# Patient Record
Sex: FEMALE | Race: WHITE | Employment: FULL TIME | URBAN - METROPOLITAN AREA
[De-identification: names, ages, dates, MRNs, and addresses within clinical notes are randomized per-mention and may not be internally consistent; named-entity substitution may affect disease eponyms.]

---

## 2017-01-27 DIAGNOSIS — I10 HYPERTENSION, GOAL BELOW 140/90: Primary | ICD-10-CM

## 2017-01-31 NOTE — TELEPHONE ENCOUNTER
metoprolol (TOPROL-XL) 50 MG 24 hr tablet      Last Written Prescription Date: 10/20/16  Last Fill Quantity: 90, # refills: 0    Last Office Visit with [facility], [facility] or [facility] prescribing provider:  12/09/16.   Future Office Visit:        BP Readings from Last 3 Encounters:   12/09/16 118/80   11/10/16 130/82   10/18/16 136/85

## 2017-02-03 NOTE — TELEPHONE ENCOUNTER
[facility] Call Center    Phone Message    Name of Caller: Sindhu    Phone Number: Cell number on file:    Telephone Information:   Mobile [phone]       Best time to return call: any    May a detailed message be left on voicemail: yes    Relation to patient: Self    Reason for Call: Other: patient is calling following up on refill request. Please advise.     Action Taken: Message routed to:  Primary Care p [phone]

## 2017-02-06 RX ORDER — METOPROLOL SUCCINATE 50 MG/1
TABLET, EXTENDED RELEASE ORAL
Qty: 90 TABLET | Refills: 2 | Status: SHIPPED | OUTPATIENT
Start: 2017-02-06 | End: 2017-10-31

## 2017-02-06 NOTE — TELEPHONE ENCOUNTER
Refilled per [facility] Refill Protocol. Informed patient refill has been completed.    Chinyere Godfrey RN, AE-C

## 2017-02-15 ENCOUNTER — TELEPHONE (OUTPATIENT)
Dept: PEDIATRICS | Facility: CLINIC | Age: 42
End: 2017-02-15

## 2017-02-15 ENCOUNTER — TELEPHONE (OUTPATIENT)
Dept: NURSING | Facility: CLINIC | Age: 42
End: 2017-02-15

## 2017-02-15 DIAGNOSIS — I10 HYPERTENSION, GOAL BELOW 140/90: Primary | ICD-10-CM

## 2017-02-15 NOTE — TELEPHONE ENCOUNTER
This could be from pulled muscle. Statin side effects tend to be affecting both leg but not just one leg. If not better by next  Week, please follow up in clinic. Do not need to stop statin.

## 2017-02-15 NOTE — TELEPHONE ENCOUNTER
[facility] Call Center    Phone Message    Name of Caller: Felice    Phone Number: Other phone number:  [phone]    Best time to return call: any    May a detailed message be left on voicemail: yes    Relation to patient: Other Name: felice  Relationship: spouse  Is there legal documentation in chart to discuss information with this person: No:  Gather information or concern from the caller.  Document in the note but do NOT release any information to the person(s).  Then send message to appropriate person, as requested by the caller.      Reason for Call: Other: Patient is having leg pain and wondering if ist is due to newly prescribed statin medication.  Please call back to advise if patient should stop taking medication.      Action Taken: Message routed to:  Primary Care p [phone]

## 2017-02-15 NOTE — TELEPHONE ENCOUNTER
"Routed FNA encounter to Dr. Rodriguez to advise.    Reason for Call:Spouse calling reporting patient had new onset of \"muscle pain in right leg.\" Symptoms starting last night. Patient is not currently available for triage. Caller stating spouse requested he call due to concerns with new statin medication started on 11/17. Reporting pain started last evening and was still present this morning when going to work, pain localized to thigh area on right leg. Spouse denies any visible change to leg. Patient contact phone .   Contacted patient directly who states pain near \"main artery\" right upper leg. \"I feel like it is more of a muscle pull. \" Rating pain a \"3\". Reporting pain is improving since yesterday. Pain increases when pulling foot back towards her.   Denies visible swelling or redness.   "

## 2017-02-15 NOTE — TELEPHONE ENCOUNTER
Gave patient this information.  She states its much better now and thinks that yes it was a pulled muscle.  She will continue with the statin.    She is concerned the statin's side effects of renal impairment may be affecting her kidneys.  She states that she has had 2 kidney infections since December and since starting the statin.  Informed apteint her last creatinine was WNL in Oct 2016. Patient has fasting lab appt tomorrow morning.     Patient wondering if Dr. Rodriguez can add a kidney lab.    Nancy Douglas RN,   [facility], [facility]

## 2017-02-15 NOTE — TELEPHONE ENCOUNTER
Statin does not cause kidney impairment or increase infection risk that I'm aware of. I added kidney function test for lab tomorrow.

## 2017-02-15 NOTE — TELEPHONE ENCOUNTER
"Clinic Action Needed:Yes, Please call patient at   Reason for Call:Spouse calling reporting patient had new onset of \"muscle pain in right leg.\" Symptoms starting last night. Patient is not currently available for triage. Caller stating spouse requested he call due to concerns with new statin medication started on 11/17. Reporting pain started last evening and was still present this morning when going to work, pain localized to thigh area on right leg. Spouse denies any visible change to leg.  Patient contact phone .   Contacted patient directly who states pain near \"main artery\" right upper leg. \"I feel like it is more of a muscle pull. \" Rating pain a \"3\". Reporting pain is improving since yesterday. Pain increases when pulling foot back towards her.   Denies visible swelling or redness.   Routed to:Dorothea Bravo RN  [facility]            "

## 2017-02-15 NOTE — TELEPHONE ENCOUNTER
"Call Type: Triage Call    Presenting Problem: Spouse calling reporting patient had new onset of  \"muscle pain in right leg.\" Symptoms starting last night. Patient is  not currently available for triage. Caller stating spouse requested  he call due to concerns with new statin medication started on 11/17.  Reporting pain started last evening and was still present this  morning when going to work, thigh area on right leg. Spouse denies  any visible change to leg.  Patient contact phone .  Contacted patient directly who states pain near \"main artery\" right  upper leg. \"I feel like it is more of a muscle pull. \" Rating pain a  \"3\". Reporting pain is improving since yesterday. Pain increases  when pulling foot back towards her.  Triage Note:  Guideline Title: Leg Non-Injury  Recommended Disposition: Provide Home/Self Care  Original Inclination: Did not know what to do  Override Disposition:  Intended Action: Follow advice given  Physician Contacted: No  All other situations ?  YES  Abnormal movements in legs at night that interrupt sleep ? NO  Swelling develops during day and resolves at night ? NO  Orthopedic hardware (metal plate, oni or screw) newly bulging under or through  skin ? NO  Mobility decreasing from known or unknown cause ? NO  Gradual onset (weeks to months) of episodes of pain shooting down any extremity ?  NO  Gradual onset or worsening weakness/paralysis OR inability to purposely move ? NO  Gradual onset or worsening numbness/tingling ? NO  New pain, aching, cramping or stiffness following exercise/activity that was not  part of regular routine or was excessive for individual ? NO  Generalized muscle pain or aching ? NO  New marked swelling (twice the normal size as compared to usual appearance) ? NO  New swelling of legs that does NOT resolve with rest and elevation of legs ? NO  Aching or swelling after prolonged sitting or standing that improves with  elevation AND not previously evaluated " ? NO  Pain along shin bone after exercise persisting for 3 days or more AND not improved  with home care ? NO  Transient stiffness, tingling, decreased sensation or swelling of lower  extremities following standing or awkward positioning for a prolonged period;  symptoms improve with change of position ? NO  Painful spasms or cramping of large muscle groups (back, legs or abdomen) AND  recent heat exposure ? NO  New onset of severe pain AND change in sensation (numb, tingling, or no feeling),  change in color (pale or blue), feels cool to touch compared to other extremity.  ? NO  Sensations of numbness, tingling, extreme sensitivity, shooting or burning  discomfort that occurs with movement and stops with rest AND not previously  evaluated ? NO  Soft, balloon-like swelling behind knee that may or may not be painful AND has not  been previously evaluated ? NO  Open area on soft tissue or over any pressure point that has not improved with 2  weeks of treatment OR is getting larger, has changes in surrounding skin color,  or becoming painful. ? NO  Open area on soft tissue or over any pressure point not previously evaluated  WITHOUT signs of infection. ? NO  New onset of inability to take unassisted weight-bearing steps ? NO  Evaluated by provider and has question/concern about their condition, treatment  plan, treatment options, follow-up appointments, or other follow-up care. ? NO  Sudden onset of shortness of breath, chest pain and cough with blood tinged sputum  ? NO  New, painful cord-like swelling in calf or thigh of the leg; cord-like swelling  may feel warm or look red or discolored. ? NO  New or worsening one-sided leg swelling with pain that may be described as achy;  pain may worsen with standing or walking. ? NO  Visible, swollen veins associated with feelings of heaviness, tiredness, burning  or aching in the legs and possible mild swelling of the ankles ? NO  New onset of unbearable pain within last 24  hours ? NO  New onset mild to moderate pain that has not improved with 48 hours of medical  treatment or home care ? NO  Known or suspected exposure to MRSA and has no signs or symptoms of localized skin  infection ? NO  Extremity swelling or limitation of range of motion AND known bleeding disorder OR  taking blood thinner, chemotherapy or transplant medications ? NO  Any new OR worsening signs and symptoms of soft tissue infection ? NO  Any signs and symptoms of soft tissue infection that have not improved with 48  hours of medical care ? NO  New unexplained weakness/paralysis, change in sensation (numbness or tingling) or  inability to purposely move, especially when one side of body is involved  occurring now or within last 4 hours ? NO  Physician Instructions:  Care Advice:

## 2017-02-16 DIAGNOSIS — Z87.74 S/P REPAIR OF COARCTATION OF AORTA: ICD-10-CM

## 2017-02-16 DIAGNOSIS — I10 HYPERTENSION, GOAL BELOW 140/90: ICD-10-CM

## 2017-02-16 DIAGNOSIS — Q25.1 COARCTATION OF AORTA: ICD-10-CM

## 2017-02-16 LAB
ANION GAP SERPL CALCULATED.3IONS-SCNC: 6 MMOL/L (ref 3–14)
BUN SERPL-MCNC: 12 MG/DL (ref 7–30)
CALCIUM SERPL-MCNC: 8.7 MG/DL (ref 8.5–10.1)
CHLORIDE SERPL-SCNC: 106 MMOL/L (ref 94–109)
CHOLEST SERPL-MCNC: 135 MG/DL
CO2 SERPL-SCNC: 29 MMOL/L (ref 20–32)
CREAT SERPL-MCNC: 0.8 MG/DL (ref 0.52–1.04)
GFR SERPL CREATININE-BSD FRML MDRD: 80 ML/MIN/1.7M2
GLUCOSE SERPL-MCNC: 96 MG/DL (ref 70–99)
HDLC SERPL-MCNC: 52 MG/DL
LDLC SERPL CALC-MCNC: 66 MG/DL
NONHDLC SERPL-MCNC: 83 MG/DL
POTASSIUM SERPL-SCNC: 3.9 MMOL/L (ref 3.4–5.3)
SODIUM SERPL-SCNC: 141 MMOL/L (ref 133–144)
TRIGL SERPL-MCNC: 87 MG/DL

## 2017-02-16 PROCEDURE — 80048 BASIC METABOLIC PNL TOTAL CA: CPT | Performed by: FAMILY MEDICINE

## 2017-02-16 PROCEDURE — 80061 LIPID PANEL: CPT | Performed by: FAMILY MEDICINE

## 2017-02-16 PROCEDURE — 36415 COLL VENOUS BLD VENIPUNCTURE: CPT | Performed by: FAMILY MEDICINE

## 2017-02-16 NOTE — PROGRESS NOTES
Dear Sindhu,   Here are your recent results which are within the expected range. Please continue with your current plan of care.     Please call or Mychart to our office if you have further questions.     Sonia Rodriguez MD

## 2017-03-15 ENCOUNTER — TRANSFERRED RECORDS (OUTPATIENT)
Dept: HEALTH INFORMATION MANAGEMENT | Facility: CLINIC | Age: 42
End: 2017-03-15

## 2017-03-15 ENCOUNTER — TELEPHONE (OUTPATIENT)
Dept: NURSING | Facility: CLINIC | Age: 42
End: 2017-03-15

## 2017-03-15 NOTE — TELEPHONE ENCOUNTER
"Call Type: Triage Call    Presenting Problem: Onset 0700 hrs this morning of \"Diarrhea\" and  vomiting, chills. Patient verbalized she is also experiencing  dizziness and double vision when sitting up, lasting seconds.  Triage Note:  Guideline Title: Nausea or Vomiting  Recommended Disposition: Activate   Original Inclination:  Override Disposition:  Intended Action:  Physician Contacted: No  Vomiting associated with sudden onset of focal neurological changes (difficulty  speaking, numbness, weakness, paralysis, loss of coordination, change in vision  such as double vision or loss of visual field) ?  YES  New or worsening signs and symptoms that may indicate shock ? NO  Following ingestion of toxic or caustic substance ? NO  Vomiting red, bloody or coffee-ground material, more than streaks of blood or  scant amount (not following nosebleed within past day) ? NO  Sudden onset vomiting following ingestion or inhalation overdose (accidental or  intentional) of illegal, prescription, nonprescription or alternative drugs ? NO  Any other cardiac signs/symptoms for more than 5 minutes, now or within last hour.  Pain is NOT associated with taking a deep breath or a productive cough, movement,  or touch to a localized area on the chest or upper body. ? NO  Physician Instructions:  Care Advice: Do not give the patient anything to eat or drink.  Call  if new or worsening drowsiness/difficulty awakening, confusion  or seizure.  IMMEDIATE ACTION  Write down provider's name. List or place the following in a bag for  transport with the patient: current prescription and/or nonprescription  medications  alternative treatments, therapies and medications  and street drugs.  "

## 2017-03-17 ENCOUNTER — OFFICE VISIT (OUTPATIENT)
Dept: FAMILY MEDICINE | Facility: CLINIC | Age: 42
End: 2017-03-17
Payer: COMMERCIAL

## 2017-03-17 VITALS
WEIGHT: 148.9 LBS | BODY MASS INDEX: 25.42 KG/M2 | RESPIRATION RATE: 12 BRPM | SYSTOLIC BLOOD PRESSURE: 124 MMHG | DIASTOLIC BLOOD PRESSURE: 80 MMHG | OXYGEN SATURATION: 100 % | HEART RATE: 70 BPM | TEMPERATURE: 97.9 F | HEIGHT: 64 IN

## 2017-03-17 DIAGNOSIS — H66.92 LEFT OTITIS MEDIA, UNSPECIFIED CHRONICITY, UNSPECIFIED OTITIS MEDIA TYPE: Primary | ICD-10-CM

## 2017-03-17 PROCEDURE — 99213 OFFICE O/P EST LOW 20 MIN: CPT | Performed by: PHYSICIAN ASSISTANT

## 2017-03-17 RX ORDER — AZITHROMYCIN 250 MG/1
TABLET, FILM COATED ORAL
Qty: 6 TABLET | Refills: 0 | Status: SHIPPED | OUTPATIENT
Start: 2017-03-17 | End: 2017-03-31

## 2017-03-17 NOTE — PATIENT INSTRUCTIONS
Take zithromax daily for five days  Drink lots of liquids  Tylenol as needed for back pain   Return urgently if any change in symptoms like fever, increasing pain or other change in symptoms.   Follow up with us if no improvement over the next 3-5 days

## 2017-03-17 NOTE — NURSING NOTE
"Chief Complaint   Patient presents with     Ear Problem       Initial /90 (BP Location: Right arm, Patient Position: Chair, Cuff Size: Adult Regular)  Pulse 70  Temp 97.9  F (36.6  C) (Oral)  Resp 12  Ht 1.613 m (5' 3.5\")  Wt 67.5 kg (148 lb 14.4 oz)  SpO2 100%  BMI 25.96 kg/m2 Estimated body mass index is 25.96 kg/(m^2) as calculated from the following:    Height as of this encounter: 1.613 m (5' 3.5\").    Weight as of this encounter: 67.5 kg (148 lb 14.4 oz).  Medication Reconciliation: manan Cason        "

## 2017-03-17 NOTE — PROGRESS NOTES
SUBJECTIVE:                                                    Sindhu Bennett is a 41 year old female who presents to clinic today for the following health issues:      Concern - Lt Ear ache     Onset: for about a week      Description:   Dull ache    Intensity: moderate    Progression of Symptoms:  worsening    Accompanying Signs & Symptoms:  Throbbing, and sounds muffled in ear       Previous history of similar problem:   no    Precipitating factors:   Worsened by: unsure    Alleviating factors:  Improved by: nothing       Therapies Tried and outcome: nothing      ED/UC Followup:    Facility:  [facility]  Date of visit: 3-15-17  Reason for visit: Vomiting and Dirrhea  Current Status: Symptoms have subsided but now has lower back ache possibly from vomiting so much     Left ear pain for approximately one week.  No fever, sweats, chills.   No sore throat.     In emergency department 3/15/17 and dehydrated and given iv fluids and zofran and  sent home with percocet and imodium.  Reports percocet gave her headache.   Diarrhea and vomiting have resolved completely.      Problem list and histories reviewed & adjusted, as indicated.  Additional history: as documented    Patient Active Problem List   Diagnosis     Coarctation of aorta     Essential hypertension     CARDIOVASCULAR SCREENING; LDL GOAL LESS THAN 130     HSV infection     S/P repair of coarctation of aorta     Hypertension, goal below 140/90     Past Surgical History   Procedure Laterality Date     Appendectomy  2006     C lap ovarian cystotomy       4 different surgeries     Angioplasty       Biopsy  '96 & '98     when cyst were removed       Social History   Substance Use Topics     Smoking status: Never Smoker     Smokeless tobacco: Never Used     Alcohol use No     Family History   Problem Relation Age of Onset     Hypertension Mother      DIABETES Father      Hypertension Father      Cancer - colorectal Maternal Grandfather   "    C.A.D. Maternal Grandfather      DIABETES Maternal Grandfather      Hypertension Maternal Grandfather      C.A.D. Maternal Uncle      CEREBROVASCULAR DISEASE Maternal Uncle      Hypertension Maternal Uncle      DIABETES Maternal Uncle      DIABETES Maternal Grandmother      Multiple Sclerosis Maternal Grandmother      Hypertension Brother      Colon Cancer Paternal Grandfather      Coronary Artery Disease Paternal Uncle      Asthma No family hx of      Breast Cancer No family hx of      Prostate Cancer No family hx of      Thyroid Disease No family hx of      Genetic Disorder No family hx of          Current Outpatient Prescriptions   Medication Sig Dispense Refill            metoprolol (TOPROL-XL) 50 MG 24 hr tablet TAKE ONE TABLET BY MOUTH ONE TIME DAILY  90 tablet 2     aspirin 81 MG chewable tablet Take 1 tablet (81 mg) by mouth daily 90 tablet 3     simvastatin (ZOCOR) 20 MG tablet Take 1 tablet (20 mg) by mouth At Bedtime 90 tablet 3       Reviewed and updated as needed this visit by clinical staff  Tobacco  Allergies  Meds  Med Hx  Surg Hx  Fam Hx  Soc Hx      Reviewed and updated as needed this visit by Provider         ROS:  Constitutional, HEENT, cardiovascular, pulmonary, gi and gu systems are negative, except as otherwise noted.    OBJECTIVE:                                                    /80  Pulse 70  Temp 97.9  F (36.6  C) (Oral)  Resp 12  Ht 1.613 m (5' 3.5\")  Wt 67.5 kg (148 lb 14.4 oz)  SpO2 100%  BMI 25.96 kg/m2  Body mass index is 25.96 kg/(m^2).  GENERAL: healthy, alert and no distress  EYES: Eyes grossly normal to inspection, PERRL and conjunctivae and sclerae normal  HENT: normal cephalic/atraumatic, right ear: normal: no effusions, no erythema, normal landmarks, left ear: erythematous, nose and mouth without ulcers or lesions, oropharynx clear and oral mucous membranes moist  NECK: no adenopathy, no asymmetry, masses, or scars and thyroid normal to palpation  RESP: lungs " clear to auscultation - no rales, rhonchi or wheezes  CV: regular rate and rhythm, normal S1 S2, no S3 or S4, no murmur, click or rub, no peripheral edema and peripheral pulses strong  ABDOMEN: soft, nontender, no hepatosplenomegaly, no masses and bowel sounds normal  MS: no gross musculoskeletal defects noted, no edema    Diagnostic Test Results:  none      ASSESSMENT/PLAN:                                                            1. Left otitis media, unspecified chronicity, unspecified otitis media type    - azithromycin (ZITHROMAX) 250 MG tablet; Two tablets first day, then one tablet daily for four days.  Dispense: 6 tablet; Refill: 0    Patient Instructions   Take zithromax daily for five days  Drink lots of liquids  Tylenol as needed for back pain   Return urgently if any change in symptoms like fever, increasing pain or other change in symptoms.   Follow up with us if no improvement over the next 3-5 days        Sahara Howard PA-C  [facility]

## 2017-03-17 NOTE — MR AVS SNAPSHOT
After Visit Summary   3/17/2017    Sindhu Bennett    MRN: [id number]           Patient Information     Date Of Birth          1975        Visit Information        Provider Department      3/17/2017 2:20 PM Sahara Howard PA-C [facility]        Today's Diagnoses     Left otitis media, unspecified chronicity, unspecified otitis media type    -  1      Care Instructions    Take zithromax daily for five days  Drink lots of liquids  Tylenol as needed for back pain   Return urgently if any change in symptoms like fever, increasing pain or other change in symptoms.   Follow up with us if no improvement over the next 3-5 days          Follow-ups after your visit        Who to contact     If you have questions or need follow up information about today's clinic visit or your schedule please contact [facility] directly at [phone].  Normal or non-critical lab and imaging results will be communicated to you by [employer], letter or phone within 4 business days after the clinic has received the results. If you do not hear from us within 7 days, please contact the clinic through MyChart or phone. If you have a critical or abnormal lab result, we will notify you by phone as soon as possible.  Submit refill requests through [employer] or call your pharmacy and they will forward the refill request to us. Please allow 3 business days for your refill to be completed.          Additional Information About Your Visit        MyChart Information     [employer] gives you secure access to your electronic health record. If you see a primary care provider, you can also send messages to your care team and make appointments. If you have questions, please call your primary care clinic.  If you do not have a primary care provider, please call [phone] and they will assist you.        Care EveryWhere ID     This is your Care EveryWhere ID. This could be used by other organizations  "to access your [facility] medical records  [id number]        Your Vitals Were     Pulse Temperature Respirations Height Pulse Oximetry BMI (Body Mass Index)    70 97.9  F (36.6  C) (Oral) 12 1.613 m (5' 3.5\") 100% 25.96 kg/m2       Blood Pressure from Last 3 Encounters:   03/17/17 124/80   12/09/16 118/80   11/10/16 130/82    Weight from Last 3 Encounters:   03/17/17 67.5 kg (148 lb 14.4 oz)   12/09/16 67.1 kg (148 lb)   11/10/16 67.1 kg (148 lb)              Today, you had the following     No orders found for display         Today's Medication Changes          These changes are accurate as of: 3/17/17  2:39 PM.  If you have any questions, ask your nurse or doctor.               Start taking these medicines.        Dose/Directions    azithromycin 250 MG tablet   Commonly known as:  ZITHROMAX   Used for:  Left otitis media, unspecified chronicity, unspecified otitis media type   Started by:  Sahara Howard PA-C        Two tablets first day, then one tablet daily for four days.   Quantity:  6 tablet   Refills:  0            Where to get your medicines      These medications were sent to [facility] - [place], [place] - [address]  [address] [address]     Phone:  [phone]     azithromycin 250 MG tablet                Primary Care Provider Office Phone # Fax #    Sonia Rodriguez [phone]       [facility] [address]  [facility] [address]        Thank you!     Thank you for choosing [facility]  for your care. Our goal is always to provide you with excellent care. Hearing back from our patients is one way we can continue to improve our services. Please take a few minutes to complete the written survey that you may receive in the mail after your visit with us. Thank you!             Your Updated Medication List - Protect others around you: Learn how to safely use, store and throw away your medicines at www.disposemymeds.org.        "   This list is accurate as of: 3/17/17  2:39 PM.  Always use your most recent med list.                   Brand Name Dispense Instructions for use    aspirin 81 MG chewable tablet     90 tablet    Take 1 tablet (81 mg) by mouth daily       azithromycin 250 MG tablet    ZITHROMAX    6 tablet    Two tablets first day, then one tablet daily for four days.       metoprolol 50 MG 24 hr tablet    TOPROL-XL    90 tablet    TAKE ONE TABLET BY MOUTH ONE TIME DAILY       simvastatin 20 MG tablet    ZOCOR    90 tablet    Take 1 tablet (20 mg) by mouth At Bedtime

## 2017-03-31 ENCOUNTER — OFFICE VISIT (OUTPATIENT)
Dept: FAMILY MEDICINE | Facility: CLINIC | Age: 42
End: 2017-03-31
Payer: COMMERCIAL

## 2017-03-31 VITALS
SYSTOLIC BLOOD PRESSURE: 124 MMHG | DIASTOLIC BLOOD PRESSURE: 84 MMHG | TEMPERATURE: 98.4 F | HEART RATE: 80 BPM | WEIGHT: 147 LBS | RESPIRATION RATE: 16 BRPM | BODY MASS INDEX: 26.05 KG/M2 | HEIGHT: 63 IN

## 2017-03-31 DIAGNOSIS — R30.0 DYSURIA: Primary | ICD-10-CM

## 2017-03-31 DIAGNOSIS — N89.8 VAGINAL ITCHING: ICD-10-CM

## 2017-03-31 LAB
ALBUMIN UR-MCNC: NEGATIVE MG/DL
APPEARANCE UR: CLEAR
BACTERIA #/AREA URNS HPF: ABNORMAL /HPF
BILIRUB UR QL STRIP: NEGATIVE
COLOR UR AUTO: YELLOW
GLUCOSE UR STRIP-MCNC: NEGATIVE MG/DL
HGB UR QL STRIP: ABNORMAL
KETONES UR STRIP-MCNC: NEGATIVE MG/DL
LEUKOCYTE ESTERASE UR QL STRIP: NEGATIVE
MICRO REPORT STATUS: NORMAL
NITRATE UR QL: NEGATIVE
NON-SQ EPI CELLS #/AREA URNS LPF: ABNORMAL /LPF
PH UR STRIP: 5.5 PH (ref 5–7)
RBC #/AREA URNS AUTO: ABNORMAL /HPF (ref 0–2)
SP GR UR STRIP: 1.01 (ref 1–1.03)
SPECIMEN SOURCE: NORMAL
URN SPEC COLLECT METH UR: ABNORMAL
UROBILINOGEN UR STRIP-ACNC: 0.2 EU/DL (ref 0.2–1)
WBC #/AREA URNS AUTO: ABNORMAL /HPF (ref 0–2)
WET PREP SPEC: NORMAL

## 2017-03-31 PROCEDURE — 99214 OFFICE O/P EST MOD 30 MIN: CPT | Performed by: PHYSICIAN ASSISTANT

## 2017-03-31 PROCEDURE — 87086 URINE CULTURE/COLONY COUNT: CPT | Performed by: PHYSICIAN ASSISTANT

## 2017-03-31 PROCEDURE — 81001 URINALYSIS AUTO W/SCOPE: CPT | Performed by: PHYSICIAN ASSISTANT

## 2017-03-31 PROCEDURE — 87210 SMEAR WET MOUNT SALINE/INK: CPT | Performed by: PHYSICIAN ASSISTANT

## 2017-03-31 RX ORDER — SULFAMETHOXAZOLE/TRIMETHOPRIM 800-160 MG
1 TABLET ORAL 2 TIMES DAILY
Qty: 14 TABLET | Refills: 0 | Status: SHIPPED | OUTPATIENT
Start: 2017-03-31 | End: 2017-06-16

## 2017-03-31 ASSESSMENT — PAIN SCALES - GENERAL: PAINLEVEL: MILD PAIN (2)

## 2017-03-31 NOTE — PROGRESS NOTES
SUBJECTIVE:                                                    Sindhu Bennett is a 41 year old female who presents to clinic today for the following health issues:      URINARY TRACT SYMPTOMS     Onset: 1 week, pt states that it might be caused from the cholesterol medication. Pt started the medication in October 2016, 1 episode of UTI in December and sxs now.     Voids and then immediately after feels like has to go again. +frequency, urgency. +dysuria- and some irritation when urine touches skin.    Description:   Painful urination (Dysuria): YES  Blood in urine (Hematuria): no   Delay in urine (Hesitency): no, feels like unable to empty,have the urgency,     Intensity: mild    Progression of Symptoms:  worsening    Accompanying Signs & Symptoms:  Fever/chills: No fever, but felt chilled   Flank pain no   Nausea and vomiting: no   Any vaginal symptoms: vaginal itching  Abdominal/Pelvic Pain: Yes- pelvic pain started couple day ago    History:   History of frequent UTI's: no   History of kidney stones: no   Sexually Active: YES  Possibility of pregnancy: No    Precipitating factors:       Patient's last menstrual period was 03/04/2017 (exact date).  Sexually active. Vasectomy.   No new partners or concerns about stds.  No vaginal discharge    Had zithromax a few weeks ago.        Therapies Tried and outcome: Cranberry juice prn and Increase fluid intake      Problem list and histories reviewed & adjusted, as indicated.  Additional history: as documented    Patient Active Problem List   Diagnosis     Coarctation of aorta     Essential hypertension     CARDIOVASCULAR SCREENING; LDL GOAL LESS THAN 130     HSV infection     S/P repair of coarctation of aorta     Hypertension, goal below 140/90     Past Surgical History:   Procedure Laterality Date     ANGIOPLASTY       APPENDECTOMY  2006     BIOPSY  '96 & '98    when cyst were removed     C LAP OVARIAN CYSTOTOMY      4 different surgeries       Social  History   Substance Use Topics     Smoking status: Never Smoker     Smokeless tobacco: Never Used     Alcohol use No     Family History   Problem Relation Age of Onset     Hypertension Mother      DIABETES Father      Hypertension Father      Cancer - colorectal Maternal Grandfather      C.A.D. Maternal Grandfather      DIABETES Maternal Grandfather      Hypertension Maternal Grandfather      C.A.D. Maternal Uncle      CEREBROVASCULAR DISEASE Maternal Uncle      Hypertension Maternal Uncle      DIABETES Maternal Uncle      DIABETES Maternal Grandmother      Multiple Sclerosis Maternal Grandmother      Hypertension Brother      Colon Cancer Paternal Grandfather      Coronary Artery Disease Paternal Uncle      Asthma No family hx of      Breast Cancer No family hx of      Prostate Cancer No family hx of      Thyroid Disease No family hx of      Genetic Disorder No family hx of          Current Outpatient Prescriptions   Medication Sig Dispense Refill     metoprolol (TOPROL-XL) 50 MG 24 hr tablet TAKE ONE TABLET BY MOUTH ONE TIME DAILY  90 tablet 2     aspirin 81 MG chewable tablet Take 1 tablet (81 mg) by mouth daily 90 tablet 3     simvastatin (ZOCOR) 20 MG tablet Take 1 tablet (20 mg) by mouth At Bedtime 90 tablet 3     Allergies   Allergen Reactions     Penicillins Rash     Was a very red facial rash     Erythromycin      Severe vomiting     Ketorolac      Levaquin [Levofloxacin]      Nitroglycerin      No Clinical Screening - See Comments      Torasol     Doxycycline Rash     BP Readings from Last 3 Encounters:   03/31/17 124/84   03/17/17 124/80   12/09/16 118/80    Wt Readings from Last 3 Encounters:   03/31/17 147 lb (66.7 kg)   03/17/17 148 lb 14.4 oz (67.5 kg)   12/09/16 148 lb (67.1 kg)                  Labs reviewed in EPIC    Reviewed and updated as needed this visit by clinical staff  Tobacco  Allergies  Med Hx  Surg Hx  Fam Hx  Soc Hx      Reviewed and updated as needed this visit by Provider      "    ROS:  As above    OBJECTIVE:                                                    /84 (BP Location: Left arm, Cuff Size: Adult Regular)  Pulse 80  Temp 98.4  F (36.9  C) (Temporal)  Resp 16  Ht 5' 3.39\" (1.61 m)  Wt 147 lb (66.7 kg)  LMP 03/04/2017 (Exact Date)  BMI 25.72 kg/m2  Body mass index is 25.72 kg/(m^2).  GENERAL: healthy, alert and no distress  ABDOMEN: neg CVA TTP, soft, minimal suprapubic tenderness   (female): normal female external genitalia- no labial erythema, swelling, lesions, normal urethral meatus, vaginal mucosa pale, small creamy white discharge normal cervix  NEURO: Normal strength and tone, mentation intact and speech normal  Psych: mood and affect pleasant, normal speech    Diagnostic Test Results:  Results for orders placed or performed in visit on 03/31/17 (from the past 24 hour(s))   *UA reflex to Microscopic and Culture ([place] and [facility] (except [place] and [place])   Result Value Ref Range    Color Urine Yellow     Appearance Urine Clear     Glucose Urine Negative NEG mg/dL    Bilirubin Urine Negative NEG    Ketones Urine Negative NEG mg/dL    Specific Gravity Urine 1.015 1.003 - 1.035    Blood Urine Small (A) NEG    pH Urine 5.5 5.0 - 7.0 pH    Protein Albumin Urine Negative NEG mg/dL    Urobilinogen Urine 0.2 0.2 - 1.0 EU/dL    Nitrite Urine Negative NEG    Leukocyte Esterase Urine Negative NEG    Source Midstream Urine    Urine Microscopic   Result Value Ref Range    WBC Urine O - 2 0 - 2 /HPF    RBC Urine 2-5 (A) 0 - 2 /HPF    Squamous Epithelial /LPF Urine Few FEW /LPF    Bacteria Urine Few (A) NEG /HPF   Wet prep   Result Value Ref Range    Specimen Description Vagina     Wet Prep       No Trichomonas seen  No clue cells seen  No yeast seen      Micro Report Status FINAL 03/31/2017         ASSESSMENT/PLAN:                                                        1. Dysuria  Will start treatment, await culture results.   - *UA reflex to Microscopic and " Culture ([place] and [facility] (except [place] and [place])  - Urine Microscopic  - Urine Culture Aerobic Bacterial  - sulfamethoxazole-trimethoprim (BACTRIM DS/SEPTRA DS) 800-160 MG per tablet; Take 1 tablet by mouth 2 times daily  Dispense: 14 tablet; Refill: 0    2. Vaginal itching  Neg wet prep. Could try otc yeast cream to labia and area that has been itching.   - Wet prep    Follow Up: For worsening symptoms (ie new fevers, worsening pain, etc), non-improvement as expected/discussed, questions regarding your medications or treatment plan. Discussed parameters for follow up and included in After Visit Summary given to patient.      Galian Grossman PA-C  [facility]

## 2017-03-31 NOTE — PATIENT INSTRUCTIONS
Take antibiotics as directed    Increase your fluid intake    Urine was sent for culture    Follow up - call or return for recheck if you have new fevers, pain in the back over the kidneys, vomiting, or worsening symptoms      The vaginal swab was negative

## 2017-03-31 NOTE — NURSING NOTE
"Chief Complaint   Patient presents with     UTI     Panel Management     KENYA, Microalbumin,        Initial /84 (BP Location: Left arm, Cuff Size: Adult Regular)  Pulse 80  Temp 98.4  F (36.9  C) (Temporal)  Resp 16  Ht 5' 3.39\" (1.61 m)  Wt 147 lb (66.7 kg)  LMP 03/04/2017 (Exact Date)  BMI 25.72 kg/m2 Estimated body mass index is 25.72 kg/(m^2) as calculated from the following:    Height as of this encounter: 5' 3.39\" (1.61 m).    Weight as of this encounter: 147 lb (66.7 kg).  Medication Reconciliation: complete       Cristobal Chaves MA    "

## 2017-03-31 NOTE — MR AVS SNAPSHOT
After Visit Summary   3/31/2017    Sindhu Bennett    MRN: [id number]           Patient Information     Date Of Birth          1975        Visit Information        Provider Department      3/31/2017 4:20 PM Galina Grossman PA-C [facility]        Today's Diagnoses     Dysuria    -  1    Vaginal itching          Care Instructions    Take antibiotics as directed    Increase your fluid intake    Urine was sent for culture    Follow up - call or return for recheck if you have new fevers, pain in the back over the kidneys, vomiting, or worsening symptoms      The vaginal swab was negative            Follow-ups after your visit        Who to contact     If you have questions or need follow up information about today's clinic visit or your schedule please contact [facility] directly at [phone].  Normal or non-critical lab and imaging results will be communicated to you by MyChart, letter or phone within 4 business days after the clinic has received the results. If you do not hear from us within 7 days, please contact the clinic through MyChart or phone. If you have a critical or abnormal lab result, we will notify you by phone as soon as possible.  Submit refill requests through [facility] or call your pharmacy and they will forward the refill request to us. Please allow 3 business days for your refill to be completed.          Additional Information About Your Visit        MyChart Information     [facility] [employer] gives you secure access to your electronic health record. If you see a primary care provider, you can also send messages to your care team and make appointments. If you have questions, please call your primary care clinic.  If you do not have a primary care provider, please call [phone] and they will assist you.        Care EveryWhere ID     This is your Care EveryWhere ID. This could be used by other organizations to access your [facility]  "records  [id number]        Your Vitals Were     Pulse Temperature Respirations Height Last Period BMI (Body Mass Index)    80 98.4  F (36.9  C) (Temporal) 16 5' 3.39\" (1.61 m) 03/04/2017 (Exact Date) 25.72 kg/m2       Blood Pressure from Last 3 Encounters:   03/31/17 124/84   03/17/17 124/80   12/09/16 118/80    Weight from Last 3 Encounters:   03/31/17 147 lb (66.7 kg)   03/17/17 148 lb 14.4 oz (67.5 kg)   12/09/16 148 lb (67.1 kg)              We Performed the Following     *UA reflex to Microscopic and Culture ([place] and [facility] (except [place] and [place])     Urine Culture Aerobic Bacterial     Urine Microscopic     Wet prep          Today's Medication Changes          These changes are accurate as of: 3/31/17  5:21 PM.  If you have any questions, ask your nurse or doctor.               Start taking these medicines.        Dose/Directions    sulfamethoxazole-trimethoprim 800-160 MG per tablet   Commonly known as:  BACTRIM DS/SEPTRA DS   Used for:  Dysuria   Started by:  Galina Grossman PA-C        Dose:  1 tablet   Take 1 tablet by mouth 2 times daily   Quantity:  14 tablet   Refills:  0            Where to get your medicines      These medications were sent to [facility] - [place], [place] - [address]  [address] [address]     Phone:  [phone]     sulfamethoxazole-trimethoprim 800-160 MG per tablet                Primary Care Provider Office Phone # Fax #    Sonia Rodriguez [phone]       [facility] [address]  [facility] [address]        Thank you!     Thank you for choosing [facility]  for your care. Our goal is always to provide you with excellent care. Hearing back from our patients is one way we can continue to improve our services. Please take a few minutes to complete the written survey that you may receive in the mail after your visit with us. Thank you!             Your Updated Medication List - Protect " others around you: Learn how to safely use, store and throw away your medicines at www.disposemymeds.org.          This list is accurate as of: 3/31/17  5:21 PM.  Always use your most recent med list.                   Brand Name Dispense Instructions for use    aspirin 81 MG chewable tablet     90 tablet    Take 1 tablet (81 mg) by mouth daily       metoprolol 50 MG 24 hr tablet    TOPROL-XL    90 tablet    TAKE ONE TABLET BY MOUTH ONE TIME DAILY       simvastatin 20 MG tablet    ZOCOR    90 tablet    Take 1 tablet (20 mg) by mouth At Bedtime       sulfamethoxazole-trimethoprim 800-160 MG per tablet    BACTRIM DS/SEPTRA DS    14 tablet    Take 1 tablet by mouth 2 times daily

## 2017-04-01 LAB
BACTERIA SPEC CULT: NO GROWTH
MICRO REPORT STATUS: NORMAL
SPECIMEN SOURCE: NORMAL

## 2017-06-16 ENCOUNTER — OFFICE VISIT (OUTPATIENT)
Dept: PEDIATRICS | Facility: CLINIC | Age: 42
End: 2017-06-16
Payer: COMMERCIAL

## 2017-06-16 VITALS
HEART RATE: 77 BPM | BODY MASS INDEX: 26.18 KG/M2 | TEMPERATURE: 98.4 F | OXYGEN SATURATION: 98 % | WEIGHT: 149.6 LBS | DIASTOLIC BLOOD PRESSURE: 60 MMHG | SYSTOLIC BLOOD PRESSURE: 130 MMHG

## 2017-06-16 DIAGNOSIS — J02.0 ACUTE STREPTOCOCCAL PHARYNGITIS: Primary | ICD-10-CM

## 2017-06-16 DIAGNOSIS — R07.0 THROAT PAIN: ICD-10-CM

## 2017-06-16 LAB
DEPRECATED S PYO AG THROAT QL EIA: ABNORMAL
MICRO REPORT STATUS: ABNORMAL
SPECIMEN SOURCE: ABNORMAL

## 2017-06-16 PROCEDURE — 99213 OFFICE O/P EST LOW 20 MIN: CPT | Performed by: NURSE PRACTITIONER

## 2017-06-16 PROCEDURE — 87880 STREP A ASSAY W/OPTIC: CPT | Performed by: NURSE PRACTITIONER

## 2017-06-16 RX ORDER — AZITHROMYCIN 250 MG/1
TABLET, FILM COATED ORAL
Qty: 6 TABLET | Refills: 0 | Status: SHIPPED | OUTPATIENT
Start: 2017-06-16 | End: 2017-07-20

## 2017-06-16 NOTE — MR AVS SNAPSHOT
After Visit Summary   6/16/2017    Sindhu Bennett    MRN: [id number]           Patient Information     Date Of Birth          1975        Visit Information        Provider Department      6/16/2017 11:20 AM Adrianne Navarro APRN CNP [facility]        Today's Diagnoses     Acute streptococcal pharyngitis    -  1    Throat pain          Care Instructions    PLAN:   1.   Symptomatic therapy suggested: rest, increase fluids, OTC acetaminophen, ibuprofen and call prn if symptoms persist or worsen.  2.  Orders Placed This Encounter   Medications     azithromycin (ZITHROMAX) 250 MG tablet     Sig: Take 2 tablets the first day and then take one a day for 4 days.     Dispense:  6 tablet     Refill:  0     3. Patient needs to follow up in if no improvement,or sooner if worsening of symptoms or other symptoms develop.          It was a pleasure seeing you today at the [facility]. Thank you for allowing us to care for you today. We truly hope we provided you with the excellent service you deserve. Please let us know if there is anything else we can do for you so we can be sure you are leaving completley satisfied with your care experience.       General information about your clinic   Clinic Hours Lab Hours (Appointments are required)   Mon-Thurs: 7:30 AM - 7 PM Mon-Thurs: 7:30 AM - 7 PM   Fri: 7:30 AM - 5 PM Fri: 7:30 AM - 5 PM        After Hours Nurse Advise & Appts:  [facility] Nurse Advisors: [phone]  [facility] On Call: to make appointments anytime: [phone] On Call Physician: call [phone] and answering service will page the on call physician.        For urgent appointments, please call [phone] and ask for the triage nurse or your care team clinic nurse.  How to contact my care team:  MyChart: [email or web address]   Phone: [phone]   Fax: [phone]       [facility]:   Phone: [phone]  Hours: 8:00 AM -  6:00 PM  Medication Refills:  Call your pharmacy and they will forward the refill to us. Please allow 3 business days for your refills to be completed.       Normal or non-critical lab and imaging results will be communicated to you by MyChart, letter or phone within 7 days.  If you do not hear from us within 10 days, please call the clinic. If you have a critical or abnormal lab result, we will notify you by phone as soon as possible.       We now have PWIC (Pediatric Walk in Care)  Monday-Friday from 7:30-4. Simply walk in and be seen for your urgent needs like cough, fever, rash, diarrhea or vomiting, pink eye, UTI. No appointments needed. Ask one of the team for more information      -Your Care Team:    Dr. Sonia Rodriguez - Internal Medicine/Pediatrics   Dr. Lilly Cartagena - Family Medicine  Dr. Jazmin Fortune - Pediatrics  Adrianne Navarro CNP - Family Practice Nurse Practitioner  Dr. Mercy Comer - Pediatrics  Dr. Mack Rodriguez - Internal Medicine                      Follow-ups after your visit        Who to contact     If you have questions or need follow up information about today's clinic visit or your schedule please contact [facility] directly at [phone].  Normal or non-critical lab and imaging results will be communicated to you by MyChart, letter or phone within 4 business days after the clinic has received the results. If you do not hear from us within 7 days, please contact the clinic through MyChart or phone. If you have a critical or abnormal lab result, we will notify you by phone as soon as possible.  Submit refill requests through [facility] or call your pharmacy and they will forward the refill request to us. Please allow 3 business days for your refill to be completed.          Additional Information About Your Visit        [employer] Information     [employer] gives you secure access to your electronic health record. If you see a primary care provider, you can also send messages to your  care team and make appointments. If you have questions, please call your primary care clinic.  If you do not have a primary care provider, please call [phone] and they will assist you.      [email or web address] is an electronic gateway that provides easy, online access to your medical records. With [email or web address], you can request a clinic appointment, read your test results, renew a prescription or communicate with your care team.     To access your existing account, please contact your [facility] or call [phone] for assistance.        Care EveryWhere ID     This is your Care EveryWhere ID. This could be used by other organizations to access your [facility] medical records  [id number]        Your Vitals Were     Pulse Temperature Last Period Pulse Oximetry Breastfeeding? BMI (Body Mass Index)    77 98.4  F (36.9  C) (Temporal) 05/31/2017 98% No 26.18 kg/m2       Blood Pressure from Last 3 Encounters:   06/16/17 130/60   03/31/17 124/84   03/17/17 124/80    Weight from Last 3 Encounters:   06/16/17 149 lb 9.6 oz (67.9 kg)   03/31/17 147 lb (66.7 kg)   03/17/17 148 lb 14.4 oz (67.5 kg)              We Performed the Following     Strep, Rapid Screen          Today's Medication Changes          These changes are accurate as of: 6/16/17 11:55 AM.  If you have any questions, ask your nurse or doctor.               Start taking these medicines.        Dose/Directions    azithromycin 250 MG tablet   Commonly known as:  ZITHROMAX   Started by:  Adrianne Navarro APRN CNP        Take 2 tablets the first day and then take one a day for 4 days.   Quantity:  6 tablet   Refills:  0            Where to get your medicines      These medications were sent to [facility] [place] - [place], [place] - [address], [address]  [address], [address], [facility] [address]     Phone:  [phone]     azithromycin 250 MG tablet                Primary Care Provider Office Phone # Fax #     Sonia Rodriguez [phone]       [facility] [address]  [facility] [address]        Thank you!     Thank you for choosing [facility]  for your care. Our goal is always to provide you with excellent care. Hearing back from our patients is one way we can continue to improve our services. Please take a few minutes to complete the written survey that you may receive in the mail after your visit with us. Thank you!             Your Updated Medication List - Protect others around you: Learn how to safely use, store and throw away your medicines at www.disposemymeds.org.          This list is accurate as of: 6/16/17 11:55 AM.  Always use your most recent med list.                   Brand Name Dispense Instructions for use    aspirin 81 MG chewable tablet     90 tablet    Take 1 tablet (81 mg) by mouth daily       azithromycin 250 MG tablet    ZITHROMAX    6 tablet    Take 2 tablets the first day and then take one a day for 4 days.       metoprolol 50 MG 24 hr tablet    TOPROL-XL    90 tablet    TAKE ONE TABLET BY MOUTH ONE TIME DAILY       simvastatin 20 MG tablet    ZOCOR    90 tablet    Take 1 tablet (20 mg) by mouth At Bedtime

## 2017-06-16 NOTE — PROGRESS NOTES
SUBJECTIVE:                                                    Sindhu Bennett is a 41 year old female who presents to clinic today for the following health issues:    Acute Illness   Acute illness concerns: sore throat   Onset: 1 week    Fever: YES    Chills/Sweats: YES    Headache (location?): YES    Sinus Pressure:YES- swollen in the throat area    Conjunctivitis:  no    Ear Pain: no    Rhinorrhea: no    Congestion: no    Sore Throat: YES     Cough: no    Wheeze: no    Decreased Appetite: YES    Nausea: YES    Vomiting: no    Diarrhea:  no    Dysuria/Freq.: no    Fatigue/Achiness: no    Sick/Strep Exposure: YES- son     Therapies Tried and outcome: tylenol, ibuprofen, asa  However some low grade symptoms were about 3 weeks prior to this     Problem list and histories reviewed & adjusted, as indicated.  Additional history: as documented    Patient Active Problem List   Diagnosis     Coarctation of aorta     Essential hypertension     CARDIOVASCULAR SCREENING; LDL GOAL LESS THAN 130     HSV infection     S/P repair of coarctation of aorta     Hypertension, goal below 140/90     Past Surgical History:   Procedure Laterality Date     ANGIOPLASTY       APPENDECTOMY  2006     BIOPSY  '96 & '98    when cyst were removed     C LAP OVARIAN CYSTOTOMY      4 different surgeries       Social History   Substance Use Topics     Smoking status: Never Smoker     Smokeless tobacco: Never Used     Alcohol use No     Family History   Problem Relation Age of Onset     Hypertension Mother      DIABETES Father      Hypertension Father      Cancer - colorectal Maternal Grandfather      C.A.D. Maternal Grandfather      DIABETES Maternal Grandfather      Hypertension Maternal Grandfather      C.A.D. Maternal Uncle      CEREBROVASCULAR DISEASE Maternal Uncle      Hypertension Maternal Uncle      DIABETES Maternal Uncle      DIABETES Maternal Grandmother      Multiple Sclerosis Maternal Grandmother      Hypertension Brother       Colon Cancer Paternal Grandfather      Coronary Artery Disease Paternal Uncle      Asthma No family hx of      Breast Cancer No family hx of      Prostate Cancer No family hx of      Thyroid Disease No family hx of      Genetic Disorder No family hx of          Current Outpatient Prescriptions   Medication Sig Dispense Refill     metoprolol (TOPROL-XL) 50 MG 24 hr tablet TAKE ONE TABLET BY MOUTH ONE TIME DAILY  90 tablet 2     aspirin 81 MG chewable tablet Take 1 tablet (81 mg) by mouth daily 90 tablet 3     simvastatin (ZOCOR) 20 MG tablet Take 1 tablet (20 mg) by mouth At Bedtime 90 tablet 3     Allergies   Allergen Reactions     Penicillins Rash     Was a very red facial rash     Erythromycin      Severe vomiting     Ketorolac      Levaquin [Levofloxacin]      Nitroglycerin      No Clinical Screening - See Comments      Torasol     Doxycycline Rash       Reviewed and updated as needed this visit by clinical staff  Tobacco  Allergies  Meds  Med Hx  Surg Hx  Fam Hx  Soc Hx      Reviewed and updated as needed this visit by Provider         ROS:  CONSTITUTIONAL:POSITIVE  for fever 99 and NEGATIVE  for anorexia and arthralgias  ENT/MOUTH: POSITIVE for fever, nasal congestion and postnasal drainage and swollen glands  NEGATIVE for epistaxis and sneezing  RESP:NEGATIVE for significant cough or SOB  CV: NEGATIVE for chest pain/chest pressure  GI: POSITIVE for nausea and NEGATIVE for diarrhea, vomiting and weight loss  MUSCULOSKELETAL: NEGATIVE for significant arthralgias or myalgia  HEME/ALLERGY/IMMUNE: POSITIVE  for allergies and NEGATIVE for bleeding disorder    OBJECTIVE:                                                    /60 (BP Location: Right arm, Patient Position: Sitting, Cuff Size: Adult Regular)  Pulse 77  Temp 98.4  F (36.9  C) (Temporal)  Wt 149 lb 9.6 oz (67.9 kg)  LMP 05/31/2017  SpO2 98%  Breastfeeding? No  BMI 26.18 kg/m2  Body mass index is 26.18 kg/(m^2).  GENERAL: Patient is well  nourished, well developed,in no apparent distress, non-toxic, in no respiratory distress and acyanotic, alert, cooperative and well hydrated  mildly ill but alert and responsive  EYES:  Right conjunctiva is not injected and without discharge.  Left conjunctiva is not injected and without discharge.  EARS: negative findings: external ears normal to inspection and palpation, no mastoid process tenderness, positive findings: , Right TM: serous middle ear fluid, Left TM: serous middle ear fluid  NOSE: positive findings: mucosa swollen, pale, and boggy,  Sinus maxillary tenderness on bilaterally.  THROAT: normal.  NECK: supple with no adenopathy,   CARDIAC:NORMAL - regular rate and rhythm without murmur.  RESP: normal respiratory rate and rhythm, lungs clear to auscultation  unlabored respirations, no intercostal retractions or accessory muscle use  ABD: Abdomen soft, non-tender.  SKIN: Skin color, texture, turgor normal. No rashes or lesions.  MS: extremities normal- no gross deformities noted, gait normal and normal muscle tone    Diagnostic Test Results:  Results for orders placed or performed in visit on 06/16/17 (from the past 24 hour(s))   Strep, Rapid Screen   Result Value Ref Range    Specimen Description Throat     Rapid Strep A Screen (A)      POSITIVE: Group A Streptococcal antigen detected by immunoassay.    Micro Report Status FINAL 06/16/2017         ASSESSMENT/PLAN:                                                      Sindhu was seen today for pharyngitis.    Diagnoses and all orders for this visit:    Acute streptococcal pharyngitis  -     azithromycin (ZITHROMAX) 250 MG tablet; Take 2 tablets the first day and then take one a day for 4 days.    Throat pain  -     Strep, Rapid Screen      PLAN:   1.   Symptomatic therapy suggested: rest, increase fluids, OTC acetaminophen, ibuprofen and call prn if symptoms persist or worsen.  2.  Orders Placed This Encounter   Medications     azithromycin (ZITHROMAX)  250 MG tablet     Sig: Take 2 tablets the first day and then take one a day for 4 days.     Dispense:  6 tablet     Refill:  0     3. Patient needs to follow up in if no improvement,or sooner if worsening of symptoms or other symptoms develop.      See Patient Instructions    TREVOR Reynoso CNP  [facility]

## 2017-06-16 NOTE — PATIENT INSTRUCTIONS
PLAN:   1.   Symptomatic therapy suggested: rest, increase fluids, OTC acetaminophen, ibuprofen and call prn if symptoms persist or worsen.  2.  Orders Placed This Encounter   Medications     azithromycin (ZITHROMAX) 250 MG tablet     Sig: Take 2 tablets the first day and then take one a day for 4 days.     Dispense:  6 tablet     Refill:  0     3. Patient needs to follow up in if no improvement,or sooner if worsening of symptoms or other symptoms develop.          It was a pleasure seeing you today at the [facility]. Thank you for allowing us to care for you today. We truly hope we provided you with the excellent service you deserve. Please let us know if there is anything else we can do for you so we can be sure you are leaving completley satisfied with your care experience.       General information about your clinic   Clinic Hours Lab Hours (Appointments are required)   Mon-Thurs: 7:30 AM - 7 PM Mon-Thurs: 7:30 AM - 7 PM   Fri: 7:30 AM - 5 PM Fri: 7:30 AM - 5 PM        After Hours Nurse Advise & Appts:  [facility] Nurse Advisors: [phone]  [facility] On Call: to make appointments anytime: [phone] On Call Physician: call [phone] and answering service will page the on call physician.        For urgent appointments, please call [phone] and ask for the triage nurse or your care team clinic nurse.  How to contact my care team:  [facility]: [email or web address]   Phone: [phone]   Fax: [phone]       [facility]:   Phone: [phone]  Hours: 8:00 AM - 6:00 PM  Medication Refills:  Call your pharmacy and they will forward the refill to us. Please allow 3 business days for your refills to be completed.       Normal or non-critical lab and imaging results will be communicated to you by MyChart, letter or phone within 7 days.  If you do not hear from us within 10 days, please call the clinic. If you have a critical or abnormal lab result, we will notify you by  phone as soon as possible.       We now have PWIC (Pediatric Walk in Care)  Monday-Friday from 7:30-4. Simply walk in and be seen for your urgent needs like cough, fever, rash, diarrhea or vomiting, pink eye, UTI. No appointments needed. Ask one of the team for more information      -Your Care Team:    Dr. Sonia Rodriguez - Internal Medicine/Pediatrics   Dr. Lilly Cartagena - Family Medicine  Dr. Jazmin Fortune - Pediatrics  Adrianne Navarro CNP - Family Practice Nurse Practitioner  Dr. Mercy Comer - Pediatrics  Dr. Mack Rodriguez - Internal Medicine

## 2017-06-16 NOTE — NURSING NOTE
"Chief Complaint   Patient presents with     Pharyngitis     sore throat x 1 week, URI x 3 weeks       Initial /60 (BP Location: Right arm, Patient Position: Sitting, Cuff Size: Adult Regular)  Pulse 77  Temp 98.4  F (36.9  C) (Temporal)  Wt 149 lb 9.6 oz (67.9 kg)  LMP 05/31/2017  SpO2 98%  Breastfeeding? No  BMI 26.18 kg/m2 Estimated body mass index is 26.18 kg/(m^2) as calculated from the following:    Height as of 3/31/17: 5' 3.39\" (1.61 m).    Weight as of this encounter: 149 lb 9.6 oz (67.9 kg).  Medication Reconciliation: complete      JANNETTE Alejandro      "

## 2017-07-20 ENCOUNTER — OFFICE VISIT (OUTPATIENT)
Dept: PEDIATRICS | Facility: CLINIC | Age: 42
End: 2017-07-20
Payer: COMMERCIAL

## 2017-07-20 VITALS
SYSTOLIC BLOOD PRESSURE: 138 MMHG | DIASTOLIC BLOOD PRESSURE: 80 MMHG | OXYGEN SATURATION: 99 % | TEMPERATURE: 97.7 F | WEIGHT: 146 LBS | HEART RATE: 65 BPM | BODY MASS INDEX: 25.55 KG/M2

## 2017-07-20 DIAGNOSIS — R10.2 PELVIC PAIN IN FEMALE: ICD-10-CM

## 2017-07-20 DIAGNOSIS — R30.0 DYSURIA: Primary | ICD-10-CM

## 2017-07-20 LAB
ALBUMIN UR-MCNC: NEGATIVE MG/DL
APPEARANCE UR: CLEAR
BILIRUB UR QL STRIP: NEGATIVE
COLOR UR AUTO: NORMAL
GLUCOSE UR STRIP-MCNC: NEGATIVE MG/DL
HGB UR QL STRIP: NEGATIVE
KETONES UR STRIP-MCNC: NEGATIVE MG/DL
LEUKOCYTE ESTERASE UR QL STRIP: NEGATIVE
MICRO REPORT STATUS: NORMAL
NITRATE UR QL: NEGATIVE
NON-SQ EPI CELLS #/AREA URNS LPF: NORMAL /LPF
PH UR STRIP: 7 PH (ref 5–7)
RBC #/AREA URNS AUTO: NORMAL /HPF (ref 0–2)
SP GR UR STRIP: 1 (ref 1–1.03)
SPECIMEN SOURCE: NORMAL
URN SPEC COLLECT METH UR: NORMAL
UROBILINOGEN UR STRIP-MCNC: NORMAL MG/DL (ref 0–2)
WBC #/AREA URNS AUTO: NORMAL /HPF (ref 0–2)
WET PREP SPEC: NORMAL

## 2017-07-20 PROCEDURE — 81001 URINALYSIS AUTO W/SCOPE: CPT | Performed by: INTERNAL MEDICINE

## 2017-07-20 PROCEDURE — 99213 OFFICE O/P EST LOW 20 MIN: CPT | Performed by: INTERNAL MEDICINE

## 2017-07-20 PROCEDURE — 87210 SMEAR WET MOUNT SALINE/INK: CPT | Performed by: INTERNAL MEDICINE

## 2017-07-20 NOTE — MR AVS SNAPSHOT
After Visit Summary   7/20/2017    Sindhu Bennett    MRN: [id number]           Patient Information     Date Of Birth          1975        Visit Information        Provider Department      7/20/2017 5:00 PM Sonia Rodriguez MD [facility]        Today's Diagnoses     Dysuria    -  1    Pelvic pain in female          Care Instructions    You can call [phone] to schedule pelvic US.     You can try over the counter Azo.           Follow-ups after your visit        Future tests that were ordered for you today     Open Future Orders        Priority Expected Expires Ordered    US Pelvic Complete w Transvaginal Routine  7/20/2018 7/20/2017            Who to contact     If you have questions or need follow up information about today's clinic visit or your schedule please contact [facility] directly at [phone].  Normal or non-critical lab and imaging results will be communicated to you by Intermediahart, letter or phone within 4 business days after the clinic has received the results. If you do not hear from us within 7 days, please contact the clinic through Intermediahart or phone. If you have a critical or abnormal lab result, we will notify you by phone as soon as possible.  Submit refill requests through LEYIO or call your pharmacy and they will forward the refill request to us. Please allow 3 business days for your refill to be completed.          Additional Information About Your Visit        IntermediaharYouth1 Media Information     LEYIO gives you secure access to your electronic health record. If you see a primary care provider, you can also send messages to your care team and make appointments. If you have questions, please call your primary care clinic.  If you do not have a primary care provider, please call [phone] and they will assist you.      LEYIO is an electronic gateway that provides easy, online access to your medical records. With LEYIO, you can request a  clinic appointment, read your test results, renew a prescription or communicate with your care team.     To access your existing account, please contact your [facility] or call [phone] for assistance.        Care EveryWhere ID     This is your Care EveryWhere ID. This could be used by other organizations to access your [place] medical records  [id number]        Your Vitals Were     Pulse Temperature Pulse Oximetry BMI (Body Mass Index)          65 97.7  F (36.5  C) (Temporal) 99% 25.55 kg/m2         Blood Pressure from Last 3 Encounters:   07/20/17 138/80   06/16/17 130/60   03/31/17 124/84    Weight from Last 3 Encounters:   07/20/17 146 lb (66.2 kg)   06/16/17 149 lb 9.6 oz (67.9 kg)   03/31/17 147 lb (66.7 kg)              We Performed the Following     UA with Microscopic reflex to Culture ([place]; [facility])     Wet prep        Primary Care Provider Office Phone # Fax #    Sonia Rodriguez [phone]       [facility] [address]  [facility] [address]        Equal Access to Services     SHREYA [facility] : Hadii aad ku hadasho Soomaali, waaxda luqadaha, qaybta kaalmada adeegyada, waxay amandain haybrainn quentin pinzon . So [facility] [phone].    ATENCIÓN: Si habla español, tiene a peralta disposición servicios gratuitos de asistencia lingüística. Llame al [phone].    We comply with applicable federal civil rights laws and [place] laws. We do not discriminate on the basis of race, color, national origin, age, disability sex, sexual orientation or gender identity.            Thank you!     Thank you for choosing [facility]  for your care. Our goal is always to provide you with excellent care. Hearing back from our patients is one way we can continue to improve our services. Please take a few minutes to complete the written survey that you may receive in the mail after your visit with us. Thank you!             Your Updated  Medication List - Protect others around you: Learn how to safely use, store and throw away your medicines at www.disposemymeds.org.          This list is accurate as of: 7/20/17  5:47 PM.  Always use your most recent med list.                   Brand Name Dispense Instructions for use Diagnosis    aspirin 81 MG chewable tablet     90 tablet    Take 1 tablet (81 mg) by mouth daily    S/P repair of coarctation of aorta, Coarctation of aorta       metoprolol 50 MG 24 hr tablet    TOPROL-XL    90 tablet    TAKE ONE TABLET BY MOUTH ONE TIME DAILY    Hypertension, goal below 140/90       simvastatin 20 MG tablet    ZOCOR    90 tablet    Take 1 tablet (20 mg) by mouth At Bedtime    S/P repair of coarctation of aorta, Coarctation of aorta

## 2017-07-20 NOTE — NURSING NOTE
"Chief Complaint   Patient presents with     UTI       Initial /80 (Cuff Size: Adult Regular)  Pulse 65  Temp 97.7  F (36.5  C) (Temporal)  Wt 146 lb (66.2 kg)  SpO2 99%  BMI 25.55 kg/m2 Estimated body mass index is 25.55 kg/(m^2) as calculated from the following:    Height as of 3/31/17: 5' 3.39\" (1.61 m).    Weight as of this encounter: 146 lb (66.2 kg).  Medication Reconciliation: complete    "

## 2017-07-20 NOTE — PROGRESS NOTES
SUBJECTIVE:                                                    Sindhu Bennett is a 41 year old female who presents to clinic today for the following health issues:      URINARY TRACT SYMPTOMS      Duration: 3 days    Description  dysuria, frequency and urgency    Intensity:  moderate    Accompanying signs and symptoms:  Fever/chills: no   Flank pain YES for 1 1/2 weeks no gone  Nausea and vomiting: no   Vaginal symptoms: none  Abdominal/Pelvic Pain: YES    History  History of frequent UTI's: no   History of kidney stones: no   Sexually Active: YES  Possibility of pregnancy: No    Precipitating or alleviating factors: None    Therapies tried and outcome: none           Current Outpatient Prescriptions on File Prior to Visit:  metoprolol (TOPROL-XL) 50 MG 24 hr tablet TAKE ONE TABLET BY MOUTH ONE TIME DAILY    aspirin 81 MG chewable tablet Take 1 tablet (81 mg) by mouth daily   simvastatin (ZOCOR) 20 MG tablet Take 1 tablet (20 mg) by mouth At Bedtime     No current facility-administered medications on file prior to visit.       Problem list, Medication list, Allergies, and Medical/Social/Surgical histories reviewed in [facility] and updated as appropriate.    OBJECTIVE:                                                    /80 (Cuff Size: Adult Regular)  Pulse 65  Temp 97.7  F (36.5  C) (Temporal)  Wt 146 lb (66.2 kg)  SpO2 99%  BMI 25.55 kg/m2    GEN: healthy, alert and no distress  : Normal-appearing external female genitalia, vaginal mucosa normal, no discharge. Cervix normal. Mild adnexal tenderness.       Diagnostic test results:  Results for orders placed or performed in visit on 07/20/17   UA with Microscopic reflex to Culture (Dorothea Aleman; [facility])   Result Value Ref Range    Color Urine Light Yellow     Appearance Urine Clear     Glucose Urine Negative NEG mg/dL    Bilirubin Urine Negative NEG    Ketones Urine Negative NEG mg/dL    Specific Gravity Urine 1.003 1.003 - 1.035    Blood  Urine Negative NEG    pH Urine 7.0 5.0 - 7.0 pH    Protein Albumin Urine Negative NEG mg/dL    Urobilinogen mg/dL Normal 0.0 - 2.0 mg/dL    Nitrite Urine Negative NEG    Leukocyte Esterase Urine Negative NEG    Source Unspecified Urine     WBC Urine O - 2 0 - 2 /HPF    RBC Urine O - 2 0 - 2 /HPF    Squamous Epithelial /LPF Urine Few FEW /LPF   Wet prep   Result Value Ref Range    Specimen Description Vagina     Wet Prep       No Trichomonas seen  No clue cells seen  No yeast seen      Micro Report Status FINAL 07/20/2017           ASSESSMENT/PLAN:                                                      41 year old female with the following diagnoses and treatment plan:      ICD-10-CM    1. Dysuria R30.0 UA with Microscopic reflex to Culture ([place]; [facility])   2. Pelvic pain in female R10.2 Wet prep     US Pelvic Complete w Transvaginal       -- Pelvic pain and pressure: No UTI or vaginal infection. Obtain pelvic ultrasound for further evaluation.      Will call or return to clinic if worsening or symptoms not improving as discussed.  See Patient Instructions.        Sonia Rodriguez MD-PhD  [facility]    (Note: Chart documentation was done in part with Dragon Voice Recognition software. Although reviewed after completion, some word and grammatical errors may remain.)

## 2017-07-21 ENCOUNTER — RADIANT APPOINTMENT (OUTPATIENT)
Dept: ULTRASOUND IMAGING | Facility: CLINIC | Age: 42
End: 2017-07-21
Attending: INTERNAL MEDICINE
Payer: COMMERCIAL

## 2017-07-21 DIAGNOSIS — N83.202 LEFT OVARIAN CYST: Primary | ICD-10-CM

## 2017-07-21 DIAGNOSIS — R10.2 PELVIC PAIN IN FEMALE: ICD-10-CM

## 2017-07-21 PROCEDURE — 76856 US EXAM PELVIC COMPLETE: CPT | Performed by: RADIOLOGY

## 2017-07-21 PROCEDURE — 76830 TRANSVAGINAL US NON-OB: CPT | Performed by: RADIOLOGY

## 2017-07-21 NOTE — PROGRESS NOTES
Dear Sindhu,   Here are your recent results.   -- there is a complex cyst in the left ovary that may represent a bleeding cyst (hemorrhagic), that is getting better. This may be the cause of the discomfort. Radiologist recommended recheck in 6 weeks to be sure it resolves. I ordered the test for you. Please schedule for 6 weeks from now. (not 4 or 8 weeks, so that we do the ultrasound at a different part of the menstrual cycle than when this one is done).     Please call or Mychart to our office if you have further questions.     Sonia Rodriguez MD

## 2017-09-01 ENCOUNTER — OFFICE VISIT (OUTPATIENT)
Dept: PEDIATRICS | Facility: CLINIC | Age: 42
End: 2017-09-01
Payer: COMMERCIAL

## 2017-09-01 ENCOUNTER — RADIANT APPOINTMENT (OUTPATIENT)
Dept: ULTRASOUND IMAGING | Facility: CLINIC | Age: 42
End: 2017-09-01
Attending: INTERNAL MEDICINE
Payer: COMMERCIAL

## 2017-09-01 VITALS
TEMPERATURE: 97.6 F | WEIGHT: 143.1 LBS | HEART RATE: 74 BPM | SYSTOLIC BLOOD PRESSURE: 125 MMHG | OXYGEN SATURATION: 100 % | BODY MASS INDEX: 25.04 KG/M2 | DIASTOLIC BLOOD PRESSURE: 70 MMHG

## 2017-09-01 DIAGNOSIS — N83.202 LEFT OVARIAN CYST: Primary | ICD-10-CM

## 2017-09-01 DIAGNOSIS — R10.2 PELVIC PAIN IN FEMALE: ICD-10-CM

## 2017-09-01 DIAGNOSIS — L73.9 FOLLICULITIS: Primary | ICD-10-CM

## 2017-09-01 PROCEDURE — 99213 OFFICE O/P EST LOW 20 MIN: CPT | Performed by: NURSE PRACTITIONER

## 2017-09-01 PROCEDURE — 76856 US EXAM PELVIC COMPLETE: CPT | Performed by: FAMILY MEDICINE

## 2017-09-01 PROCEDURE — 76830 TRANSVAGINAL US NON-OB: CPT | Performed by: FAMILY MEDICINE

## 2017-09-01 RX ORDER — AZITHROMYCIN 250 MG/1
TABLET, FILM COATED ORAL
Qty: 6 TABLET | Refills: 0 | Status: SHIPPED | OUTPATIENT
Start: 2017-09-01 | End: 2017-10-12

## 2017-09-01 NOTE — MR AVS SNAPSHOT
After Visit Summary   9/1/2017    Sindhu Bennett    MRN: [id number]           Patient Information     Date Of Birth          1975        Visit Information        Provider Department      9/1/2017 8:40 AM Adrianne Navarro APRN CNP [facility]        Today's Diagnoses     Folliculitis    -  1      Care Instructions      PLAN:   1.   Symptomatic therapy suggested: can try some topical bacitracin.  2.  Orders Placed This Encounter   Medications     azithromycin (ZITHROMAX) 250 MG tablet     Sig: Take 2 tablets the first day and then take one a day for 4 days.     Dispense:  6 tablet     Refill:  0       3. Patient needs to follow up in if no improvement,or sooner if worsening of symptoms or other symptoms develop.  Folliculitis  Folliculitis is an inflammation of a hair follicle. A hair follicle is the little pocket where a hair grows out of the skin. Bacteria normally live on the skin. But sometimes bacteria can get trapped in a follicle and cause infection. This causes a bumpy rash. The area over the follicles is red and raised. It may itch or be painful. The bumps may have fluid (pus) inside. The pus may leak and then form crusts. Sores can spread to other areas of the body. Once it goes away, folliculitis can come back at any time. Severe cases may cause permanent hair loss and scarring.  Folliculitis can happen anywhere on the body where hair grows. It can be caused by rubbing from tight clothing. Ingrown hairs can cause it. Soaking in a hot tub or swimming pool that has bacteria in the water can cause it. It may also occur if a hair follicle is blocked by a bandage.  Sores often go away in a few days with no treatment. In some cases, medicine may be given. A small piece of skin or pus may be taken to find the type of bacteria causing the infection.  Home care  The healthcare provider may prescribe an antibiotic cream or ointment.  Oral antibiotics may also be  prescribed. Or you may be told to use an over-the-counter antibiotic cream. Follow all instructions when using any of these medicines.  General care:    Apply warm, moist compresses to the sores for 20 minutes up to 3 times a day. You can make a compress by soaking a cloth in warm water. Squeeze out excess water.    Don t cut, poke, or squeeze the sores. This can be painful and spread infection.    Don t scratch the affected area. Scratching can delay healing.    Don t shave the areas affected by folliculitis.    If the sores leak fluid, cover the area with a nonstick gauze bandage. Use as little tape as possible. Carefully discard all soiled bandages.    Dress in loose cotton clothing.    Change sheets and blankets if they are soiled by pus. Wash all clothes, towels, sheets, and cloth diapers in soap and hot water. Do not share clothes, towels, or sheets with other family members.    Do not soak the sores in bath water. This can spread infection. Instead, keep the area clean by gently washing sores with soap and warm water.    Wash your hands or use antibacterial gels often to prevent spreading the bacteria.  Follow-up care  Follow up with your healthcare provider, or as advised.  When to seek medical advice  Call your healthcare provider right away if any of these occur:    Fever of 100.4 F (38 C) or higher    Spreading of the rash    Rash does not get better with treatment    Redness or swelling that gets worse    Rash becomes more painful    Foul-smelling fluid leaking from the skin    Rash improves, but then comes back   Date Last Reviewed: 11/1/2016 2000-2017 [employer]. [address] [place] [address], [place] [address]. All rights reserved. This information is not intended as a substitute for professional medical care. Always follow your healthcare professional's instructions.    It was a pleasure seeing you today at the [facility]. Thank you for allowing us to care  for you today. We truly hope we provided you with the excellent service you deserve. Please let us know if there is anything else we can do for you so we can be sure you are leaving completley satisfied with your care experience.       General information about your clinic   Clinic Hours Lab Hours (Appointments are required)   Mon-Thurs: 7:30 AM - 7 PM Mon-Thurs: 7:30 AM - 7 PM   Fri: 7:30 AM - 5 PM Fri: 7:30 AM - 5 PM        After Hours Nurse Advise & Appts:  [facility] Nurse Advisors: [phone]  [facility] On Call: to make appointments anytime: [phone] On Call Physician: call [phone] and answering service will page the on call physician.        For urgent appointments, please call [phone] and ask for the triage nurse or your care team clinic nurse.  How to contact my care team:  MyChart: [email or web address]   Phone: [phone]   Fax: [phone]       [facility]:   Phone: [phone]  Hours: 8:00 AM - 6:00 PM  Medication Refills:  Call your pharmacy and they will forward the refill to us. Please allow 3 business days for your refills to be completed.       Normal or non-critical lab and imaging results will be communicated to you by MyChart, letter or phone within 7 days.  If you do not hear from us within 10 days, please call the clinic. If you have a critical or abnormal lab result, we will notify you by phone as soon as possible.       We now have PWIC (Pediatric Walk in Care)  Monday-Friday from 7:30-4. Simply walk in and be seen for your urgent needs like cough, fever, rash, diarrhea or vomiting, pink eye, UTI. No appointments needed. Ask one of the team for more information      -Your Care Team:    Dr. Sonia Rodriguez - Internal Medicine/Pediatrics   Dr. Lilly Cartagena - Family Medicine  Dr. Jazmin Fortune - Pediatrics  Dr. Mercy Comer - Pediatrics  Adrianne Navarro CNP - Family Practice Nurse Practitioner                           Follow-ups after your visit        Your next 10  appointments already scheduled     Sep 01, 2017  3:00 PM CDT   US PELVIC COMPLETE W TRANSVAGINAL with MGUS1, MG US TECH   [facility] ([facility])    [address] [address] [place] [id number] [phone]           Please bring a list of your medicines (including vitamins, minerals and over-the-counter drugs). Also, tell your doctor about any allergies you may have. Wear comfortable clothes and leave your valuables at home.  Adults: Drink six 8-ounce glasses of fluid one hour before your exam. Do NOT empty your bladder.  If you need to empty your bladder before your exam, try to release only a little bit of urine. Then, drink another 8oz glass of fluid.  Children: Children who are potty trained should drink at least 4 cups (32 oz) of liquid 45 minutes to one hour prior to the exam. The child s bladder must be full in order to achieve a diagnostic exam. If your child is very uncomfortable or has an urgent need to pee, please notify a technologist; they will try to find out how much longer the wait may be and provide instructions to help relieve the pressure. Occasionally it is medically necessary to insert a urinary catheter to fill the bladder.  Please call the Imaging Department at your exam site with any questions.              Who to contact     If you have questions or need follow up information about today's clinic visit or your schedule please contact [facility] directly at [phone].  Normal or non-critical lab and imaging results will be communicated to you by MyChart, letter or phone within 4 business days after the clinic has received the results. If you do not hear from us within 7 days, please contact the clinic through [employer] or phone. If you have a critical or abnormal lab result, we will notify you by phone as soon as possible.  Submit refill requests through [employer] or call your pharmacy and they will forward the refill request to us.  Please allow 3 business days for your refill to be completed.          Additional Information About Your Visit        Footnotehart Information     [employer] gives you secure access to your electronic health record. If you see a primary care provider, you can also send messages to your care team and make appointments. If you have questions, please call your primary care clinic.  If you do not have a primary care provider, please call [phone] and they will assist you.      [employer] Your [employer] is an electronic gateway that provides easy, online access to your medical records. With [employer], you can request a clinic appointment, read your test results, renew a prescription or communicate with your care team.     To access your existing account, please contact your [facility] or call [phone] for assistance.        Care EveryWhere ID     This is your Care EveryWhere ID. This could be used by other organizations to access your [facility] medical records  [id number]        Your Vitals Were     Pulse Temperature Last Period Pulse Oximetry Breastfeeding? BMI (Body Mass Index)    74 97.6  F (36.4  C) (Temporal) 08/27/2017 100% No 25.04 kg/m2       Blood Pressure from Last 3 Encounters:   09/01/17 125/70   07/20/17 138/80   06/16/17 130/60    Weight from Last 3 Encounters:   09/01/17 143 lb 1.6 oz (64.9 kg)   07/20/17 146 lb (66.2 kg)   06/16/17 149 lb 9.6 oz (67.9 kg)              Today, you had the following     No orders found for display         Today's Medication Changes          These changes are accurate as of: 9/1/17  9:11 AM.  If you have any questions, ask your nurse or doctor.               Start taking these medicines.        Dose/Directions    azithromycin 250 MG tablet   Commonly known as:  ZITHROMAX   Used for:  Folliculitis   Started by:  Adrianne Navarro APRN CNP        Take 2 tablets the first day and then take one a day for 4 days.   Quantity:  6 tablet   Refills:  0             Where to get your medicines      These medications were sent to [facility] - [place], [place] - [address]  [address], [facility] [address]     Phone:  [phone]     azithromycin 250 MG tablet                Primary Care Provider Office Phone # Fax #    Sonia Rodriguez [phone]       [address]  [facility] [address]        Equal Access to Services     [facility] Hospital[facility] : Hadii aad ku hadasho Soomaali, waaxda luqadaha, qaybta kaalmada adeegyada, waxay idiin hayaan adeeg kharash la'aan . So [facility] [phone].    ATENCIÓN: Si habla español, tiene a peralta disposición servicios gratuitos de asistencia lingüística. Llame al [phone].    We comply with applicable federal civil rights laws and [place] laws. We do not discriminate on the basis of race, color, national origin, age, disability sex, sexual orientation or gender identity.            Thank you!     Thank you for choosing [facility]  for your care. Our goal is always to provide you with excellent care. Hearing back from our patients is one way we can continue to improve our services. Please take a few minutes to complete the written survey that you may receive in the mail after your visit with us. Thank you!             Your Updated Medication List - Protect others around you: Learn how to safely use, store and throw away your medicines at www.disposemymeds.org.          This list is accurate as of: 9/1/17  9:11 AM.  Always use your most recent med list.                   Brand Name Dispense Instructions for use Diagnosis    aspirin 81 MG chewable tablet     90 tablet    Take 1 tablet (81 mg) by mouth daily    S/P repair of coarctation of aorta, Coarctation of aorta       azithromycin 250 MG tablet    ZITHROMAX    6 tablet    Take 2 tablets the first day and then take one a day for 4 days.    Folliculitis       metoprolol 50 MG 24 hr tablet    TOPROL-XL    90 tablet    TAKE ONE TABLET BY MOUTH ONE TIME  DAILY    Hypertension, goal below 140/90       simvastatin 20 MG tablet    ZOCOR    90 tablet    Take 1 tablet (20 mg) by mouth At Bedtime    S/P repair of coarctation of aorta, Coarctation of aorta

## 2017-09-01 NOTE — PROGRESS NOTES
SUBJECTIVE:   Sindhu Bennett is a 42 year old female who presents to clinic today for the following health issues:    Concern - lump in the left groin area  Onset: 2 days    Description:   Dime size lump in the left groin area with tenderness, redness forming above the lump. Noticed that there is another larger lump above the original lump. Does have some slight itching.    Intensity: mild    Progression of Symptoms:  same    Accompanying Signs & Symptoms:  Swelling, redness, increased in size, slight drainage     Previous history of similar problem:   none    Precipitating factors:   Worsened by: touching    Alleviating factors:  Improved by: none    Therapies Tried and outcome: none      Problem list and histories reviewed & adjusted, as indicated.  Additional history: as documented    Patient Active Problem List   Diagnosis     Coarctation of aorta     Essential hypertension     CARDIOVASCULAR SCREENING; LDL GOAL LESS THAN 130     HSV infection     S/P repair of coarctation of aorta     Hypertension, goal below 140/90     Past Surgical History:   Procedure Laterality Date     ANGIOPLASTY       APPENDECTOMY  2006     BIOPSY  '96 & '98    when cyst were removed     C LAP OVARIAN CYSTOTOMY      4 different surgeries       Social History   Substance Use Topics     Smoking status: Never Smoker     Smokeless tobacco: Never Used     Alcohol use No     Family History   Problem Relation Age of Onset     Hypertension Mother      DIABETES Father      Hypertension Father      Cancer - colorectal Maternal Grandfather      C.A.D. Maternal Grandfather      DIABETES Maternal Grandfather      Hypertension Maternal Grandfather      C.A.D. Maternal Uncle      CEREBROVASCULAR DISEASE Maternal Uncle      Hypertension Maternal Uncle      DIABETES Maternal Uncle      DIABETES Maternal Grandmother      Multiple Sclerosis Maternal Grandmother      Hypertension Brother      Colon Cancer Paternal Grandfather      Coronary  Artery Disease Paternal Uncle      Asthma No family hx of      Breast Cancer No family hx of      Prostate Cancer No family hx of      Thyroid Disease No family hx of      Genetic Disorder No family hx of          Current Outpatient Prescriptions   Medication Sig Dispense Refill     metoprolol (TOPROL-XL) 50 MG 24 hr tablet TAKE ONE TABLET BY MOUTH ONE TIME DAILY  90 tablet 2     aspirin 81 MG chewable tablet Take 1 tablet (81 mg) by mouth daily 90 tablet 3     simvastatin (ZOCOR) 20 MG tablet Take 1 tablet (20 mg) by mouth At Bedtime 90 tablet 3     Allergies   Allergen Reactions     Penicillins Rash     Was a very red facial rash     Erythromycin      Severe vomiting     Ketorolac      Levaquin [Levofloxacin]      Nitroglycerin      No Clinical Screening - See Comments      Torasol     Doxycycline Rash         Reviewed and updated as needed this visit by clinical staffTobacco  Allergies  Meds  Med Hx  Surg Hx  Fam Hx  Soc Hx      Reviewed and updated as needed this visit by Provider         ROS:  Constitutional, HEENT, cardiovascular, pulmonary, gi and gu systems are negative, except as otherwise noted.      OBJECTIVE:   /70 (BP Location: Right arm, Patient Position: Sitting, Cuff Size: Adult Regular)  Pulse 74  Temp 97.6  F (36.4  C) (Temporal)  Wt 143 lb 1.6 oz (64.9 kg)  LMP 08/27/2017  SpO2 100%  Breastfeeding? No  BMI 25.04 kg/m2  Body mass index is 25.04 kg/(m^2).   Wt Readings from Last 4 Encounters:   09/01/17 143 lb 1.6 oz (64.9 kg)   07/20/17 146 lb (66.2 kg)   06/16/17 149 lb 9.6 oz (67.9 kg)   03/31/17 147 lb (66.7 kg)       GENERAL APPEARANCE: healthy, alert and no distress  RESP: lungs clear to auscultation - no rales, rhonchi or wheezes  CV: regular rates and rhythm  MS: extremities normal- no gross deformities noted  SKIN: Isolated  small red papules and pustules originating in the hair follicles scattered over the pubic areas  PSYCH: mentation appears normal and affect  normal/bright    Diagnostic Test Results:  none     ASSESSMENT/PLAN:       Sindhu was seen today for mass.    Diagnoses and all orders for this visit:    Folliculitis  -     azithromycin (ZITHROMAX) 250 MG tablet; Take 2 tablets the first day and then take one a day for 4 days.  PLAN:   1.   Symptomatic therapy suggested: can try some topical bacitracin.  2.  Orders Placed This Encounter   Medications     azithromycin (ZITHROMAX) 250 MG tablet     Sig: Take 2 tablets the first day and then take one a day for 4 days.     Dispense:  6 tablet     Refill:  0       3. Patient needs to follow up in if no improvement,or sooner if worsening of symptoms or other symptoms develop.      See Patient Instructions    TREVOR Reynoso CNP  M [facility]

## 2017-09-01 NOTE — NURSING NOTE
"Chief Complaint   Patient presents with     Mass     bump in the left groin area with redness x 2 days       Initial /70 (BP Location: Right arm, Patient Position: Sitting, Cuff Size: Adult Regular)  Pulse 74  Temp 97.6  F (36.4  C) (Temporal)  Wt 143 lb 1.6 oz (64.9 kg)  LMP 08/27/2017  SpO2 100%  Breastfeeding? No  BMI 25.04 kg/m2 Estimated body mass index is 25.04 kg/(m^2) as calculated from the following:    Height as of 3/31/17: 5' 3.39\" (1.61 m).    Weight as of this encounter: 143 lb 1.6 oz (64.9 kg).  Medication Reconciliation: complete      JANNETTE Alejandro      "

## 2017-09-01 NOTE — PATIENT INSTRUCTIONS
PLAN:   1.   Symptomatic therapy suggested: can try some topical bacitracin.  2.  Orders Placed This Encounter   Medications     azithromycin (ZITHROMAX) 250 MG tablet     Sig: Take 2 tablets the first day and then take one a day for 4 days.     Dispense:  6 tablet     Refill:  0       3. Patient needs to follow up in if no improvement,or sooner if worsening of symptoms or other symptoms develop.  Folliculitis  Folliculitis is an inflammation of a hair follicle. A hair follicle is the little pocket where a hair grows out of the skin. Bacteria normally live on the skin. But sometimes bacteria can get trapped in a follicle and cause infection. This causes a bumpy rash. The area over the follicles is red and raised. It may itch or be painful. The bumps may have fluid (pus) inside. The pus may leak and then form crusts. Sores can spread to other areas of the body. Once it goes away, folliculitis can come back at any time. Severe cases may cause permanent hair loss and scarring.  Folliculitis can happen anywhere on the body where hair grows. It can be caused by rubbing from tight clothing. Ingrown hairs can cause it. Soaking in a hot tub or swimming pool that has bacteria in the water can cause it. It may also occur if a hair follicle is blocked by a bandage.  Sores often go away in a few days with no treatment. In some cases, medicine may be given. A small piece of skin or pus may be taken to find the type of bacteria causing the infection.  Home care  The healthcare provider may prescribe an antibiotic cream or ointment.  Oral antibiotics may also be prescribed. Or you may be told to use an over-the-counter antibiotic cream. Follow all instructions when using any of these medicines.  General care:    Apply warm, moist compresses to the sores for 20 minutes up to 3 times a day. You can make a compress by soaking a cloth in warm water. Squeeze out excess water.    Don t cut, poke, or squeeze the sores. This can be  painful and spread infection.    Don t scratch the affected area. Scratching can delay healing.    Don t shave the areas affected by folliculitis.    If the sores leak fluid, cover the area with a nonstick gauze bandage. Use as little tape as possible. Carefully discard all soiled bandages.    Dress in loose cotton clothing.    Change sheets and blankets if they are soiled by pus. Wash all clothes, towels, sheets, and cloth diapers in soap and hot water. Do not share clothes, towels, or sheets with other family members.    Do not soak the sores in bath water. This can spread infection. Instead, keep the area clean by gently washing sores with soap and warm water.    Wash your hands or use antibacterial gels often to prevent spreading the bacteria.  Follow-up care  Follow up with your healthcare provider, or as advised.  When to seek medical advice  Call your healthcare provider right away if any of these occur:    Fever of 100.4 F (38 C) or higher    Spreading of the rash    Rash does not get better with treatment    Redness or swelling that gets worse    Rash becomes more painful    Foul-smelling fluid leaking from the skin    Rash improves, but then comes back   Date Last Reviewed: 11/1/2016 2000-2017 [employer]. [address] [place] [address], [place] [address]. All rights reserved. This information is not intended as a substitute for professional medical care. Always follow your healthcare professional's instructions.    It was a pleasure seeing you today at the [facility]. Thank you for allowing us to care for you today. We truly hope we provided you with the excellent service you deserve. Please let us know if there is anything else we can do for you so we can be sure you are leaving completley satisfied with your care experience.       General information about your clinic   Clinic Hours Lab Hours (Appointments are required)   Mon-Thurs: 7:30 AM - 7 PM Mon-Thurs: 7:30  AM - 7 PM   Fri: 7:30 AM - 5 PM Fri: 7:30 AM - 5 PM        After Hours Nurse Advise & Appts:  [facility] Nurse Advisors: [phone]  [facility] On Call: to make appointments anytime: [phone] On Call Physician: call [phone] and answering service will page the on call physician.        For urgent appointments, please call [phone] and ask for the triage nurse or your care team clinic nurse.  How to contact my care team:  MyChart: [email or web address]   Phone: [phone]   Fax: [phone]       [facility]:   Phone: [phone]  Hours: 8:00 AM - 6:00 PM  Medication Refills:  Call your pharmacy and they will forward the refill to us. Please allow 3 business days for your refills to be completed.       Normal or non-critical lab and imaging results will be communicated to you by MyChart, letter or phone within 7 days.  If you do not hear from us within 10 days, please call the clinic. If you have a critical or abnormal lab result, we will notify you by phone as soon as possible.       We now have PWIC (Pediatric Walk in Care)  Monday-Friday from 7:30-4. Simply walk in and be seen for your urgent needs like cough, fever, rash, diarrhea or vomiting, pink eye, UTI. No appointments needed. Ask one of the team for more information      -Your Care Team:    Dr. Sonia Rodriguez - Internal Medicine/Pediatrics   Dr. Lilly Cartagena - Family Medicine  Dr. Jazmin Fortune - Pediatrics  Dr. Mercy Comer - Pediatrics  Adrianne Navarro CNP - Family Practice Nurse Practitioner

## 2017-09-07 NOTE — PROGRESS NOTES
Dear Sindhu,   Here are your recent results.   -- ultrasound showed that the cyst on the left side is getting smaller. So this would favor that you had a ruptured cyst on the left side. Usually this gets resolved over time.   -- we can consider rechecking this in 1-2 months to be sure it gets closer to normal cyst.   -- I have already ordered this as a future order in the computer.     Please call or Mychart to our office if you have further questions.     Sonia Rodriguez MD

## 2017-10-06 ENCOUNTER — RADIANT APPOINTMENT (OUTPATIENT)
Dept: ULTRASOUND IMAGING | Facility: CLINIC | Age: 42
End: 2017-10-06
Attending: INTERNAL MEDICINE
Payer: COMMERCIAL

## 2017-10-06 DIAGNOSIS — N83.202 LEFT OVARIAN CYST: ICD-10-CM

## 2017-10-06 PROCEDURE — 76856 US EXAM PELVIC COMPLETE: CPT | Performed by: STUDENT IN AN ORGANIZED HEALTH CARE EDUCATION/TRAINING PROGRAM

## 2017-10-06 PROCEDURE — 76830 TRANSVAGINAL US NON-OB: CPT | Performed by: STUDENT IN AN ORGANIZED HEALTH CARE EDUCATION/TRAINING PROGRAM

## 2017-10-10 NOTE — PROGRESS NOTES
Dear Sindhu,   Here are your recent results.   -- good news. The complex cyst seen before is gone.     Please call or Mychart to our office if you have further questions.     Sonia Rodriguez MD-PhD

## 2017-10-12 ENCOUNTER — APPOINTMENT (OUTPATIENT)
Dept: GENERAL RADIOLOGY | Facility: CLINIC | Age: 42
End: 2017-10-12
Attending: EMERGENCY MEDICINE
Payer: COMMERCIAL

## 2017-10-12 ENCOUNTER — APPOINTMENT (OUTPATIENT)
Dept: CT IMAGING | Facility: CLINIC | Age: 42
End: 2017-10-12
Attending: EMERGENCY MEDICINE
Payer: COMMERCIAL

## 2017-10-12 ENCOUNTER — HOSPITAL ENCOUNTER (OUTPATIENT)
Facility: CLINIC | Age: 42
Setting detail: OBSERVATION
Discharge: HOME | End: 2017-10-13
Attending: EMERGENCY MEDICINE | Admitting: HOSPITALIST
Payer: COMMERCIAL

## 2017-10-12 DIAGNOSIS — R07.9 ACUTE CHEST PAIN: Primary | ICD-10-CM

## 2017-10-12 LAB
ALBUMIN SERPL-MCNC: 3.9 G/DL (ref 3.4–5)
ALP SERPL-CCNC: 69 U/L (ref 40–150)
ALT SERPL W P-5'-P-CCNC: 23 U/L (ref 0–50)
ANION GAP SERPL CALCULATED.3IONS-SCNC: 5 MMOL/L (ref 3–14)
AST SERPL W P-5'-P-CCNC: 14 U/L (ref 0–45)
BASOPHILS # BLD AUTO: 0 10E9/L (ref 0–0.2)
BASOPHILS NFR BLD AUTO: 0.4 %
BILIRUB SERPL-MCNC: 0.6 MG/DL (ref 0.2–1.3)
BUN SERPL-MCNC: 15 MG/DL (ref 7–30)
CALCIUM SERPL-MCNC: 8.9 MG/DL (ref 8.5–10.1)
CHLORIDE SERPL-SCNC: 108 MMOL/L (ref 94–109)
CO2 SERPL-SCNC: 27 MMOL/L (ref 20–32)
CREAT SERPL-MCNC: 0.8 MG/DL (ref 0.52–1.04)
DIFFERENTIAL METHOD BLD: NORMAL
EOSINOPHIL # BLD AUTO: 0.3 10E9/L (ref 0–0.7)
EOSINOPHIL NFR BLD AUTO: 3.4 %
ERYTHROCYTE [DISTWIDTH] IN BLOOD BY AUTOMATED COUNT: 13.6 % (ref 10–15)
GFR SERPL CREATININE-BSD FRML MDRD: 79 ML/MIN/1.7M2
GLUCOSE SERPL-MCNC: 92 MG/DL (ref 70–99)
HCT VFR BLD AUTO: 40.3 % (ref 35–47)
HGB BLD-MCNC: 13.5 G/DL (ref 11.7–15.7)
IMM GRANULOCYTES # BLD: 0 10E9/L (ref 0–0.4)
IMM GRANULOCYTES NFR BLD: 0.1 %
LYMPHOCYTES # BLD AUTO: 2.6 10E9/L (ref 0.8–5.3)
LYMPHOCYTES NFR BLD AUTO: 31.2 %
MCH RBC QN AUTO: 28.8 PG (ref 26.5–33)
MCHC RBC AUTO-ENTMCNC: 33.5 G/DL (ref 31.5–36.5)
MCV RBC AUTO: 86 FL (ref 78–100)
MONOCYTES # BLD AUTO: 0.6 10E9/L (ref 0–1.3)
MONOCYTES NFR BLD AUTO: 7.7 %
NEUTROPHILS # BLD AUTO: 4.7 10E9/L (ref 1.6–8.3)
NEUTROPHILS NFR BLD AUTO: 57.2 %
NRBC # BLD AUTO: 0 10*3/UL
NRBC BLD AUTO-RTO: 0 /100
NT-PROBNP SERPL-MCNC: 27 PG/ML (ref 0–450)
PLATELET # BLD AUTO: 229 10E9/L (ref 150–450)
POTASSIUM SERPL-SCNC: 4.2 MMOL/L (ref 3.4–5.3)
PROT SERPL-MCNC: 7.5 G/DL (ref 6.8–8.8)
RBC # BLD AUTO: 4.69 10E12/L (ref 3.8–5.2)
SODIUM SERPL-SCNC: 140 MMOL/L (ref 133–144)
TROPONIN I SERPL-MCNC: <0.015 UG/L (ref 0–0.04)
TROPONIN I SERPL-MCNC: <0.015 UG/L (ref 0–0.04)
WBC # BLD AUTO: 8.2 10E9/L (ref 4–11)

## 2017-10-12 PROCEDURE — 93005 ELECTROCARDIOGRAM TRACING: CPT

## 2017-10-12 PROCEDURE — 99220 ZZC INITIAL OBSERVATION CARE,LEVL III: CPT | Performed by: HOSPITALIST

## 2017-10-12 PROCEDURE — G0378 HOSPITAL OBSERVATION PER HR: HCPCS

## 2017-10-12 PROCEDURE — 85025 COMPLETE CBC W/AUTO DIFF WBC: CPT | Performed by: EMERGENCY MEDICINE

## 2017-10-12 PROCEDURE — 99285 EMERGENCY DEPT VISIT HI MDM: CPT | Mod: 25

## 2017-10-12 PROCEDURE — 83880 ASSAY OF NATRIURETIC PEPTIDE: CPT | Performed by: EMERGENCY MEDICINE

## 2017-10-12 PROCEDURE — 25000125 ZZHC RX 250: Performed by: EMERGENCY MEDICINE

## 2017-10-12 PROCEDURE — 80053 COMPREHEN METABOLIC PANEL: CPT | Performed by: EMERGENCY MEDICINE

## 2017-10-12 PROCEDURE — 25000132 ZZH RX MED GY IP 250 OP 250 PS 637: Performed by: EMERGENCY MEDICINE

## 2017-10-12 PROCEDURE — 25000132 ZZH RX MED GY IP 250 OP 250 PS 637: Performed by: HOSPITALIST

## 2017-10-12 PROCEDURE — 71020 XR CHEST 2 VW: CPT

## 2017-10-12 PROCEDURE — 84484 ASSAY OF TROPONIN QUANT: CPT | Performed by: EMERGENCY MEDICINE

## 2017-10-12 PROCEDURE — 36415 COLL VENOUS BLD VENIPUNCTURE: CPT | Performed by: HOSPITALIST

## 2017-10-12 PROCEDURE — 25000128 H RX IP 250 OP 636: Performed by: EMERGENCY MEDICINE

## 2017-10-12 PROCEDURE — 84484 ASSAY OF TROPONIN QUANT: CPT | Mod: 91 | Performed by: HOSPITALIST

## 2017-10-12 PROCEDURE — 74174 CTA ABD&PLVS W/CONTRAST: CPT

## 2017-10-12 RX ORDER — ASPIRIN 325 MG
325 TABLET ORAL ONCE
Status: COMPLETED | OUTPATIENT
Start: 2017-10-12 | End: 2017-10-12

## 2017-10-12 RX ORDER — ACETAMINOPHEN 650 MG/1
650 SUPPOSITORY RECTAL EVERY 4 HOURS PRN
Status: DISCONTINUED | OUTPATIENT
Start: 2017-10-12 | End: 2017-10-13 | Stop reason: HOSPADM

## 2017-10-12 RX ORDER — NALOXONE HYDROCHLORIDE 0.4 MG/ML
.1-.4 INJECTION, SOLUTION INTRAMUSCULAR; INTRAVENOUS; SUBCUTANEOUS
Status: DISCONTINUED | OUTPATIENT
Start: 2017-10-12 | End: 2017-10-13 | Stop reason: HOSPADM

## 2017-10-12 RX ORDER — ALUMINA, MAGNESIA, AND SIMETHICONE 2400; 2400; 240 MG/30ML; MG/30ML; MG/30ML
15-30 SUSPENSION ORAL EVERY 4 HOURS PRN
Status: DISCONTINUED | OUTPATIENT
Start: 2017-10-12 | End: 2017-10-13 | Stop reason: HOSPADM

## 2017-10-12 RX ORDER — ASPIRIN 81 MG/1
81 TABLET, CHEWABLE ORAL DAILY
Status: DISCONTINUED | OUTPATIENT
Start: 2017-10-13 | End: 2017-10-13 | Stop reason: HOSPADM

## 2017-10-12 RX ORDER — CLONIDINE HYDROCHLORIDE 0.1 MG/1
0.1 TABLET ORAL EVERY 4 HOURS PRN
Status: DISCONTINUED | OUTPATIENT
Start: 2017-10-12 | End: 2017-10-13 | Stop reason: HOSPADM

## 2017-10-12 RX ORDER — ACETAMINOPHEN 325 MG/1
650 TABLET ORAL EVERY 4 HOURS PRN
Status: DISCONTINUED | OUTPATIENT
Start: 2017-10-12 | End: 2017-10-13 | Stop reason: HOSPADM

## 2017-10-12 RX ORDER — LABETALOL HYDROCHLORIDE 5 MG/ML
10 INJECTION, SOLUTION INTRAVENOUS
Status: DISCONTINUED | OUTPATIENT
Start: 2017-10-12 | End: 2017-10-13 | Stop reason: HOSPADM

## 2017-10-12 RX ORDER — LIDOCAINE 40 MG/G
CREAM TOPICAL
Status: DISCONTINUED | OUTPATIENT
Start: 2017-10-12 | End: 2017-10-13 | Stop reason: HOSPADM

## 2017-10-12 RX ORDER — IOPAMIDOL 755 MG/ML
75 INJECTION, SOLUTION INTRAVASCULAR ONCE
Status: COMPLETED | OUTPATIENT
Start: 2017-10-12 | End: 2017-10-12

## 2017-10-12 RX ADMIN — ACETAMINOPHEN 650 MG: 325 TABLET, FILM COATED ORAL at 21:22

## 2017-10-12 RX ADMIN — IOPAMIDOL 75 ML: 755 INJECTION, SOLUTION INTRAVENOUS at 17:42

## 2017-10-12 RX ADMIN — ASPIRIN 325 MG ORAL TABLET 325 MG: 325 PILL ORAL at 16:30

## 2017-10-12 RX ADMIN — SODIUM CHLORIDE 70 ML: 9 INJECTION, SOLUTION INTRAVENOUS at 17:43

## 2017-10-12 ASSESSMENT — ENCOUNTER SYMPTOMS
HEADACHES: 1
DECREASED CONCENTRATION: 1
NECK PAIN: 0
MYALGIAS: 1
FATIGUE: 1

## 2017-10-12 NOTE — IP AVS SNAPSHOT
MRN:[id number]                      After Visit Summary   10/12/2017    Sindhu Bennett    MRN: [id number]           Thank you!     Thank you for choosing [facility] for your care. Our goal is always to provide you with excellent care. Hearing back from our patients is one way we can continue to improve our services. Please take a few minutes to complete the written survey that you may receive in the mail after you visit with us. Thank you!        Patient Information     Date Of Birth          1975        About your hospital stay     You were admitted on:  October 12, 2017 You last received care in the:  [facility]    You were discharged on:  October 13, 2017        Reason for your hospital stay       You were hospitalized for further evaluation and treatment of chest pain and posterior headache.                  Who to Call     For medical emergencies, please call 911.  For non-urgent questions about your medical care, please call your primary care provider or clinic, [phone]          Attending Provider     Provider Specialty    Annette Hernandez MD Emergency Medicine    Jeffrey, Inder Betts DO Internal Medicine       Primary Care Provider Office Phone # Fax #    Sonia Rodriguez MD PhD [phone]      After Care Instructions     Activity       Your activity upon discharge: activity as tolerated            Diet       Follow this diet upon discharge: Regular diet            Discharge Instructions       1) Follow-up with your PCP in the next week to discuss hospitalization   2) Pain control with tylenol   3) Resume prior to admission medications, no changes made.                  Follow-up Appointments     Follow-up and recommended labs and tests        Follow up with primary care provider, Sonia Rodriguez, within 7 days for hospital follow- up.  No follow up labs or test are needed.                  Pending Results     Date and Time Order Name Status  "Description    10/13/2017 1428 MR Head w/o Contrast Angiogram Preliminary     10/13/2017 1428 MR Neck w/o & w Contrast Angiogram Preliminary             Statement of Approval     Ordered          10/13/17 4936  I have reviewed and agree with all the recommendations and orders detailed in this document.  EFFECTIVE NOW     Approved and electronically signed by:  Galina Crooks PA-C             Admission Information     Date & Time Provider Department Dept. Phone    10/12/2017 Inder Murphy DO [facility] [phone]      Your Vitals Were     Blood Pressure Pulse Temperature Respirations Height Weight    141/83 (BP Location: Right arm) 67 97.4  F (36.3  C) (Oral) 16 1.626 m (5' 4.02\") 64.5 kg (142 lb 3.2 oz)    Pulse Oximetry BMI (Body Mass Index)                100% 24.4 kg/m2          MyChart Information     [employer] gives you secure access to your electronic health record. If you see a primary care provider, you can also send messages to your care team and make appointments. If you have questions, please call your primary care clinic.  If you do not have a primary care provider, please call [phone] and they will assist you.        Care EveryWhere ID     This is your Care EveryWhere ID. This could be used by other organizations to access your [place] medical records  [id number]        Equal Access to Services     MALLIKA JEROME : Hadii jess monahan hadasho Sorossanaali, waaxda luqadaha, qaybta kaalmada simon, shannan rapp. So [facility] [phone].    ATENCIÓN: Si habla español, tiene a peralta disposición servicios gratuitos de asistencia lingüística. Llame al [phone].    We comply with applicable federal civil rights laws and [place] laws. We do not discriminate on the basis of race, color, national origin, age, disability, sex, sexual orientation, or gender identity.               Review of your medicines      CONTINUE these medicines which have NOT " CHANGED        Dose / Directions    aspirin 81 MG chewable tablet   Used for:  S/P repair of coarctation of aorta, Coarctation of aorta        Dose:  81 mg   Take 1 tablet (81 mg) by mouth daily   Quantity:  90 tablet   Refills:  3       metoprolol 50 MG 24 hr tablet   Commonly known as:  TOPROL-XL   Used for:  Hypertension, goal below 140/90        TAKE ONE TABLET BY MOUTH ONE TIME DAILY   Quantity:  90 tablet   Refills:  2       simvastatin 20 MG tablet   Commonly known as:  ZOCOR   Used for:  S/P repair of coarctation of aorta, Coarctation of aorta        Dose:  20 mg   Take 1 tablet (20 mg) by mouth At Bedtime   Quantity:  90 tablet   Refills:  3                Protect others around you: Learn how to safely use, store and throw away your medicines at www.disposemymeds.org.             Medication List: This is a list of all your medications and when to take them. Check marks below indicate your daily home schedule. Keep this list as a reference.      Medications           Morning Afternoon Evening Bedtime As Needed    aspirin 81 MG chewable tablet   Take 1 tablet (81 mg) by mouth daily   Last time this was given:  81 mg on 10/13/2017  7:55 AM                                metoprolol 50 MG 24 hr tablet   Commonly known as:  TOPROL-XL   TAKE ONE TABLET BY MOUTH ONE TIME DAILY                                simvastatin 20 MG tablet   Commonly known as:  ZOCOR   Take 1 tablet (20 mg) by mouth At Bedtime

## 2017-10-12 NOTE — ED PROVIDER NOTES
History     Chief Complaint:  Chest Pain     The history is provided by the patient.      Sindhu Bennett is a 42 year old female with history of aortic coarctation and HTN who presents with chest pain. Yesterday the patient developed an intermittent headache located on around the back of her head. The pain did not travel but it is still present today. Today, the patient woke up feeling fatigued and went to the gym to try and wake up. Her exercise did not help her, and she reports being unable to concentrate. She developed chest pain at around 1000. She describes the pain as achy pressure that is constant. She currently rates the pain 7/10 in severity and it is aggravated with deep breathing, turning her pain sharp, and it has no alleviating factors. Of note, the patient states that she developed some left arm aches and blurry vision while in the ED. She denies any neck pain, pain with exercise, history of chest pain, or other medical concerns.     CARDIAC RISK FACTORS:  Sex:    Female  Tobacco:   Neg  Hypertension:   Pos  Hyperlipidemia:  Neg  Diabetes:   Neg  Family History:  Neg    PE/DVT RISK FACTORS:  Sex:    Female  Hormones:   Neg  Tobacco:   Neg  Cancer:   Neg  Travel:   Neg  Surgery:   Neg  Other immobilization: Neg  Personal history:  Neg  Family history:  Neg     Allergies:  Penicillins  Erythromycin  Ketorolac  Levaquin [Levofloxacin]  Nitroglycerin  No Clinical Screening - See Comments  Doxycycline      Medications:    Zithromax  Toprol  Aspirin  Zocor    Past Medical History:    Coarctation of aorta   Endometriosis   History of pre-eclampsia   HTN (hypertension)   Other and unspecified ovarian cyst     Past Surgical History:    Angioplasty  Appendectomy  Biopsy  Ovarian cystotomy x4    Family History:    HTN  Diabetes    Social History:  Presents alone   Tobacco use: Never  Alcohol use: Yes  PCP: Sonia Rodriguez    Marital Status:        Review of Systems   Constitutional: Positive for  "fatigue.   Cardiovascular: Positive for chest pain.   Musculoskeletal: Positive for myalgias. Negative for neck pain.   Neurological: Positive for headaches.   Psychiatric/Behavioral: Positive for decreased concentration.   All other systems reviewed and are negative.    Physical Exam     Patient Vitals for the past 24 hrs:   BP Temp Temp src Pulse Heart Rate Resp SpO2 Height Weight   10/12/17 2000 127/85 - - - 64 - 98 % - -   10/12/17 1904 127/82 - - - 63 - 99 % - -   10/12/17 1730 133/84 - - - 70 - 97 % - -   10/12/17 1700 133/77 - - - 75 15 98 % - -   10/12/17 1617 - - - - - - 99 % - -   10/12/17 1615 134/82 - - - - - - - -   10/12/17 1425 (!) 166/105 98.7  F (37.1  C) Oral 67 - 18 100 % 1.626 m (5' 4\") 63.5 kg (140 lb)      Physical Exam  General: Patient is alert and normal appearing.  HEENT: Head atraumatic    Eyes: pupils equal and reactive. Conjunctiva clear   Nares: patent   Oropharynx: no lesions, uvula midline, no palatal draping, normal voice, no trismus  Neck: Supple without lymphadenopathy, no meningismus  Chest: Heart regular rate and rhythm.   Lungs: Equal clear to auscultation with no wheeze or rales  Abdomen: Soft, non tender, nondistended, normal bowel sounds  Back: No costovertebral angle tenderness, no midline C, T or L spine tenderness  Neuro: Grossly nonfocal, normal speech, strength equal bilaterally, CN 2-12 intact  Extremities: No deformities, equal radial and DP pulses. No clubbing, cyanosis.  No edema  Skin: Warm and dry with no rash.       Emergency Department Course   ECG (14:27:25):  Rate 69 bpm. NH interval 152. QRS duration 86. QT/QTc 416/445. P-R-T axes 25 40 21. Normal sinus rhythm. Normal ECG. Agree with computer interpretation. Interpreted at 1555 by Annette Hernandez MD.     Imaging:  Radiographic findings were communicated with the patient who voiced understanding of the findings.  CTA Angiogram Chest/Abd/Pelvis w Processing  Negative for acute aortic dissection per my " reading.    XR Chest 2 Views  IMPRESSION: No acute cardiopulmonary abnormality.    NENITA CARDENAS MD    Imaging independently reviewed and agree with radiologist interpretation.     Laboratory:  CBC: WNL (WBC 8.2, HGB 13.5, )   CMP: WNL (Creatinine 0.80)  1650: Troponin: <0.015  BNP: 27    Interventions:  1630: Aspirin 325 mg PO    Emergency Department Course:  Past medical records, nursing notes, and vitals reviewed.  1541: I performed an exam of the patient and obtained history, as documented above.   1624: I rechecked the patient. Explained findings to patient.     Findings and plan explained to the Patient who consents to admission.     1951: Discussed the patient with Dr. Murphy, who will admit the patient to an obs bed for further monitoring, evaluation, and treatment.      I personally reviewed the laboratory results with the Patient and answered all related questions prior to admit.   Impression & Plan    Medical Decision Making:  Sindhu Bennett is a 42 year old female with history of coarctation of aorta with stent in place presents to the emergency department for chest pressure pain radiating to her left arm as well as a point area at the back of her head. She has tenderness at the point insertion of the trapezius, but given her history I elected to obtain a CTA. Fortunately it was negative for acute aortic dissection. Chest XR shows no other acute cardiopulmonary abnormality. BNP and troponin were negative. I do not suspect PE in this patient. She has persistent pain here. She has an allergy to nitroglycerin so only given aspirin here. With continued mild pain and complicated history with Coarctation of aorta I feel she warrants observation stay for cardiac rule out. I discussed the case with Dr. Murphy who graciously accepts the patient for admission. The patient expressed understanding and agreement of the plan as all questions addressed prior to admit.      Diagnosis:    ICD-10-CM    1. Acute chest pain R07.9       Disposition:  Admitted to Dr. Murphy for further evaluation and treatment.      Frederick Nicole  10/12/2017    EMERGENCY DEPARTMENT  I, Frederick Nicole, am serving as a scribe at 3:41 PM on 10/12/2017 to document services personally performed by Annette Hernandez MD based on my observations and the provider's statements to me.       Annette Hernandez MD  10/12/17 6379

## 2017-10-12 NOTE — IP AVS SNAPSHOT
[facility]    [address] [place] [id number]    Phone:  [phone]                                       After Visit Summary   10/12/2017    Sindhu Bennett    MRN: [id number]           After Visit Summary Signature Page     I have received my discharge instructions, and my questions have been answered. I have discussed any challenges I see with this plan with the nurse or doctor.    ..........................................................................................................................................  Patient/Patient Representative Signature      ..........................................................................................................................................  Patient Representative Print Name and Relationship to Patient    ..................................................               ................................................  Date                                            Time    ..........................................................................................................................................  Reviewed by Signature/Title    ...................................................              ..............................................  Date                                                            Time

## 2017-10-13 ENCOUNTER — APPOINTMENT (OUTPATIENT)
Dept: NUCLEAR MEDICINE | Facility: CLINIC | Age: 42
End: 2017-10-13
Attending: HOSPITALIST
Payer: COMMERCIAL

## 2017-10-13 ENCOUNTER — APPOINTMENT (OUTPATIENT)
Dept: CARDIOLOGY | Facility: CLINIC | Age: 42
End: 2017-10-13
Attending: HOSPITALIST
Payer: COMMERCIAL

## 2017-10-13 ENCOUNTER — APPOINTMENT (OUTPATIENT)
Dept: MRI IMAGING | Facility: CLINIC | Age: 42
End: 2017-10-13
Attending: PHYSICIAN ASSISTANT
Payer: COMMERCIAL

## 2017-10-13 ENCOUNTER — DOCUMENTATION ONLY (OUTPATIENT)
Dept: CARDIOLOGY | Facility: CLINIC | Age: 42
End: 2017-10-13

## 2017-10-13 VITALS
WEIGHT: 142.2 LBS | RESPIRATION RATE: 16 BRPM | HEIGHT: 64 IN | HEART RATE: 67 BPM | SYSTOLIC BLOOD PRESSURE: 131 MMHG | BODY MASS INDEX: 24.28 KG/M2 | OXYGEN SATURATION: 99 % | TEMPERATURE: 97.8 F | DIASTOLIC BLOOD PRESSURE: 83 MMHG

## 2017-10-13 LAB
ANION GAP SERPL CALCULATED.3IONS-SCNC: 7 MMOL/L (ref 3–14)
BASOPHILS # BLD AUTO: 0 10E9/L (ref 0–0.2)
BASOPHILS NFR BLD AUTO: 0.4 %
BUN SERPL-MCNC: 11 MG/DL (ref 7–30)
CALCIUM SERPL-MCNC: 8.9 MG/DL (ref 8.5–10.1)
CHLORIDE SERPL-SCNC: 106 MMOL/L (ref 94–109)
CO2 SERPL-SCNC: 26 MMOL/L (ref 20–32)
CREAT SERPL-MCNC: 0.8 MG/DL (ref 0.52–1.04)
D DIMER PPP FEU-MCNC: <0.3 UG/ML FEU (ref 0–0.5)
DIFFERENTIAL METHOD BLD: NORMAL
EOSINOPHIL # BLD AUTO: 0.2 10E9/L (ref 0–0.7)
EOSINOPHIL NFR BLD AUTO: 3.3 %
ERYTHROCYTE [DISTWIDTH] IN BLOOD BY AUTOMATED COUNT: 13.6 % (ref 10–15)
GFR SERPL CREATININE-BSD FRML MDRD: 80 ML/MIN/1.7M2
GLUCOSE SERPL-MCNC: 89 MG/DL (ref 70–99)
HCT VFR BLD AUTO: 39.3 % (ref 35–47)
HGB BLD-MCNC: 13.2 G/DL (ref 11.7–15.7)
IMM GRANULOCYTES # BLD: 0 10E9/L (ref 0–0.4)
IMM GRANULOCYTES NFR BLD: 0.2 %
LYMPHOCYTES # BLD AUTO: 1.6 10E9/L (ref 0.8–5.3)
LYMPHOCYTES NFR BLD AUTO: 31.4 %
MCH RBC QN AUTO: 28.8 PG (ref 26.5–33)
MCHC RBC AUTO-ENTMCNC: 33.6 G/DL (ref 31.5–36.5)
MCV RBC AUTO: 86 FL (ref 78–100)
MONOCYTES # BLD AUTO: 0.5 10E9/L (ref 0–1.3)
MONOCYTES NFR BLD AUTO: 9.4 %
NEUTROPHILS # BLD AUTO: 2.8 10E9/L (ref 1.6–8.3)
NEUTROPHILS NFR BLD AUTO: 55.3 %
NRBC # BLD AUTO: 0 10*3/UL
NRBC BLD AUTO-RTO: 0 /100
PLATELET # BLD AUTO: 193 10E9/L (ref 150–450)
POTASSIUM SERPL-SCNC: 4 MMOL/L (ref 3.4–5.3)
RBC # BLD AUTO: 4.59 10E12/L (ref 3.8–5.2)
SODIUM SERPL-SCNC: 139 MMOL/L (ref 133–144)
TROPONIN I SERPL-MCNC: <0.015 UG/L (ref 0–0.04)
WBC # BLD AUTO: 5.1 10E9/L (ref 4–11)

## 2017-10-13 PROCEDURE — 36415 COLL VENOUS BLD VENIPUNCTURE: CPT | Performed by: PHYSICIAN ASSISTANT

## 2017-10-13 PROCEDURE — 93016 CV STRESS TEST SUPVJ ONLY: CPT | Performed by: INTERNAL MEDICINE

## 2017-10-13 PROCEDURE — 70549 MR ANGIOGRAPH NECK W/O&W/DYE: CPT

## 2017-10-13 PROCEDURE — A9585 GADOBUTROL INJECTION: HCPCS | Performed by: HOSPITALIST

## 2017-10-13 PROCEDURE — 70544 MR ANGIOGRAPHY HEAD W/O DYE: CPT

## 2017-10-13 PROCEDURE — 34300033 ZZH RX 343: Performed by: HOSPITALIST

## 2017-10-13 PROCEDURE — 25500064 ZZH RX 255 OP 636: Performed by: HOSPITALIST

## 2017-10-13 PROCEDURE — 25000128 H RX IP 250 OP 636: Performed by: HOSPITALIST

## 2017-10-13 PROCEDURE — 80048 BASIC METABOLIC PNL TOTAL CA: CPT | Performed by: PHYSICIAN ASSISTANT

## 2017-10-13 PROCEDURE — 99217 ZZC OBSERVATION CARE DISCHARGE: CPT | Performed by: PHYSICIAN ASSISTANT

## 2017-10-13 PROCEDURE — 40000264 ECHO COMPLETE WITH LUMASON

## 2017-10-13 PROCEDURE — A9502 TC99M TETROFOSMIN: HCPCS | Performed by: HOSPITALIST

## 2017-10-13 PROCEDURE — 78452 HT MUSCLE IMAGE SPECT MULT: CPT

## 2017-10-13 PROCEDURE — 78452 HT MUSCLE IMAGE SPECT MULT: CPT | Mod: 26 | Performed by: INTERNAL MEDICINE

## 2017-10-13 PROCEDURE — 93018 CV STRESS TEST I&R ONLY: CPT | Performed by: INTERNAL MEDICINE

## 2017-10-13 PROCEDURE — 25000132 ZZH RX MED GY IP 250 OP 250 PS 637: Performed by: HOSPITALIST

## 2017-10-13 PROCEDURE — 84484 ASSAY OF TROPONIN QUANT: CPT | Performed by: HOSPITALIST

## 2017-10-13 PROCEDURE — 93017 CV STRESS TEST TRACING ONLY: CPT

## 2017-10-13 PROCEDURE — 85379 FIBRIN DEGRADATION QUANT: CPT | Performed by: PHYSICIAN ASSISTANT

## 2017-10-13 PROCEDURE — 99215 OFFICE O/P EST HI 40 MIN: CPT | Performed by: INTERNAL MEDICINE

## 2017-10-13 PROCEDURE — 93306 TTE W/DOPPLER COMPLETE: CPT | Mod: 26 | Performed by: INTERNAL MEDICINE

## 2017-10-13 PROCEDURE — 85025 COMPLETE CBC W/AUTO DIFF WBC: CPT | Performed by: PHYSICIAN ASSISTANT

## 2017-10-13 PROCEDURE — G0378 HOSPITAL OBSERVATION PER HR: HCPCS

## 2017-10-13 PROCEDURE — 36415 COLL VENOUS BLD VENIPUNCTURE: CPT | Performed by: HOSPITALIST

## 2017-10-13 RX ORDER — GADOBUTROL 604.72 MG/ML
10 INJECTION INTRAVENOUS ONCE
Status: COMPLETED | OUTPATIENT
Start: 2017-10-13 | End: 2017-10-13

## 2017-10-13 RX ADMIN — TETROFOSMIN 8.4 MCI.: 1.38 INJECTION, POWDER, LYOPHILIZED, FOR SOLUTION INTRAVENOUS at 07:15

## 2017-10-13 RX ADMIN — GADOBUTROL 10 ML: 604.72 INJECTION INTRAVENOUS at 15:57

## 2017-10-13 RX ADMIN — SULFUR HEXAFLUORIDE 2 ML: KIT at 08:20

## 2017-10-13 RX ADMIN — TETROFOSMIN 26 MCI.: 1.38 INJECTION, POWDER, LYOPHILIZED, FOR SOLUTION INTRAVENOUS at 09:25

## 2017-10-13 RX ADMIN — ASPIRIN 81 MG 81 MG: 81 TABLET ORAL at 07:55

## 2017-10-13 NOTE — PLAN OF CARE
Problem: Patient Care Overview  Goal: Plan of Care/Patient Progress Review  Outcome: No Change   PRIOR TO DISCHARGE     Comments: List all goals to be met before discharge home:   - Serial troponins and stress test complete Goal not met  - Seen and cleared by consultant if applicable Goal not met  - Adequate pain control on oral analgesia Goal met  - Vital signs normal Goal met  - patient baseline goal not met

## 2017-10-13 NOTE — PLAN OF CARE
Problem: Patient Care Overview  Goal: Plan of Care/Patient Progress Review  Outcome: No Change  RN: Alert and oriented X 4, up Ind without assistance. Trops negative X 3, echo and stress test done; results grossly normal. Denies arm, CP. Endorsed mild L posterior lateral neck pain this AM of 3/10, gradually resolved without intervention. Cards consult completed, cleared from their standpoint. Pt does have hx of cerebral infarct. Possible MRA today or d/c with plans for outpt f/u.

## 2017-10-13 NOTE — PLAN OF CARE
Problem: Patient Care Overview  Goal: Plan of Care/Patient Progress Review  Outcome: No Change   PRIOR TO DISCHARGE     Comments: List all goals to be met before discharge home:   - Serial troponins and stress test complete Goal not met  - Seen and cleared by consultant if applicable Goal not met  - Adequate pain control on oral analgesia Goal met  - Vital signs normal Goal met  - patient baseline goal not met          A&O. VSS. C/o of lower left sided head pain. Tylenol and warm packs given. Denies CP/SOB. Independent in room. Troponin's neg x 3. Plan for echo and aryan scan today. Cardiology consult. Tele-sinus osmany

## 2017-10-13 NOTE — PHARMACY-ADMISSION MEDICATION HISTORY
Admission Medication History    Admission medication history interview status for the 10/12/2017 admission is complete. See EPIC admission navigator for prior to admission medications     Medication history source reliability:Good    Actions taken by pharmacist (provider contacted, etc):None     Additional medication history information not noted on PTA med list :None    Medication reconciliation/reorder completed by provider prior to medication history? No    Time spent in this activity: 10 minutes    Prior to Admission medications    Medication Sig Last Dose Taking? Auth Provider   metoprolol (TOPROL-XL) 50 MG 24 hr tablet TAKE ONE TABLET BY MOUTH ONE TIME DAILY  10/11/2017 at PM Yes Sonia Rodriguez MD PhD   aspirin 81 MG chewable tablet Take 1 tablet (81 mg) by mouth daily 10/11/2017 at PM Yes MarchHi MD   simvastatin (ZOCOR) 20 MG tablet Take 1 tablet (20 mg) by mouth At Bedtime 10/11/2017 at PM Yes MarchHi MD David S. Cline, PharmD

## 2017-10-13 NOTE — PROGRESS NOTES
Stress test results in. Grossly normal. Cardiology in room completing consult, aware of results. Likely d/c today.

## 2017-10-13 NOTE — PLAN OF CARE
Problem: Patient Care Overview  Goal: Plan of Care/Patient Progress Review  Outcome: No Change   PRIOR TO DISCHARGE     Comments: List all goals to be met before discharge home:   - Serial troponins and stress test complete Goal not met  - Seen and cleared by consultant if applicable Goal not met  - Adequate pain control on oral analgesia Goal met  - Vital signs normal Goal met  - patient baseline goal not met          Troponins neg x 3

## 2017-10-13 NOTE — DISCHARGE SUMMARY
"[facility]    Discharge Summary  Hospitalist    Date of Admission:  10/12/2017  Date of Discharge:  10/13/2017  6:09 PM  Discharging Provider: Galina Crooks PA-C  Date of Service (when I saw the patient): 10/13/17    Discharge Diagnoses   Atypical chest pain, likely musculoskeletal  Posterior headache, resolved     History of Present Illness   Sindhu Bennett is an 42 year old female with PMHx of coarctation of the aorta, HTN, hyperlipidemia, hx of left cerebellar CVA, and endometriosis who presented with complaints of chest pain, registered under observation for further evaluation and treatment. See H&P by Dr. Murphy from 10/12/17 for complete details on presentation.     Symptoms of intermittent, acute onset headache localized to left, posterior occiput began 10/11 with radiation down left arm and into chest. Slowly improved. 10/12, pt developed substernal chest pain while sitting at her desk at work. Noted pain was aggravated with deep breathing. This prompted presentation to the ED. Associated symptoms included blurred vision, \"grogginess\" and lightheadedness. No acute events overnight. Pt without recurrence of symptoms. At the time of my interview, denies chest pain, dizziness, lightheadedness, headache, vision changes. No focal neuro deficits.     Hospital Course   Sindhu Bennett was admitted on 10/12/2017.  The following problems were addressed during her hospitalization:    Atypical chest pain  Posterior, occipital headache:  Hx as above. CMP, CBC, BNP WNL. D-dimer negative. BNP 27. Troponin negative X3. Tele showing NSR overnight. CXR negative. CTA angio without acute pathology. Echocardiogram with preserved EF and no evidence of valvular abnormalities. Lexiscan without evidence for ischemia. MRA head and neck negative for dissection. Given presentation as described in HPI, differential included ACS vs PE vs dissection.   -- Given negative work-up above, " etiology most consistent with musculoskeletal origin. Pt endorses making some lifestyle changes joining weight watchers and exercising more. Does state that on 10/10, she did have an upper extremity work out. Chest pain is reproducible upon palpation of chest wall in substernal and left side. Cervical pain not reproducible. Symptoms seemed to improve with rest indicating muscle strain.   -- Follow-up with PCP in the next week to discuss hospitalization   -- Tylenol for pain control, ice/heat   -- No medication changes made at discharge     HTN  Hyperlipidemia: Continue PTA statin, BB, and ASA at discharge     History of coarctation of the aorta: Followed by Dr. Love of Cardiology. Stable. Hx of stent placement at [facility] in 2001.     History of CVA, left cerebellar: Diagnosed due to ongoing dizziness after pt's second pregnancy. No residual deficits.     Galina Crooks PA-C    This patient was discussed with Dr. Castillo of the Hospitalist Service who independently interviewed and examined the patient and agrees with current plans as outlined above.    Significant Results and Procedures   See below     Pending Results   These results will be followed up by PCP   Unresulted Labs Ordered in the Past 30 Days of this Admission     No orders found for last 61 day(s).          Code Status   Full Code       Primary Care Physician   Sonia Rodriguez    Physical Exam   Temp: 97.8  F (36.6  C) Temp src: Oral BP: 131/83   Heart Rate: 82 Resp: 16 SpO2: 99 % O2 Device: None (Room air)    Vitals:    10/12/17 1425 10/12/17 2050   Weight: 63.5 kg (140 lb) 64.5 kg (142 lb 3.2 oz)     Vital Signs with Ranges  Temp:  [96.5  F (35.8  C)-97.8  F (36.6  C)] 97.8  F (36.6  C)  Heart Rate:  [57-82] 82  Resp:  [16] 16  BP: (118-141)/(66-83) 131/83  SpO2:  [99 %-100 %] 99 %     CONSTITUTIONAL: Pt laying in bed, dressed in hospital garb. Appears compfortable. Cooperative with interview.   HEENT: Normocephalic, atraumatic. Negative  for conjunctival redness or scleral icterus.  Oral mucosa pink and moist; negative for ulcerations, erythema, or exudates.    CARDIOVASCULAR: RRR, no murmurs, rubs, or extra heart sounds appreciated. Pulses +2/4 and regular in upper and lower extremities, bilaterally.   RESPIRATORY: No increased work of breathing.  CTA, bilat; no wheezes, rales, or rhonchi appreciated.  GASTROINTESTINAL:  Abdomen soft, non-distended. BS auscultated in all four quadrants. Negative for tenderness to palpation.  No masses or organomegaly noted.  MUSCULOSKELETAL: Chest pain reproducible upon palpation of sternum and left chest wall. No gross deformities noted. Normal muscle tone.   HEMATOLOGIC/LYMPHATIC/IMMUNOLOGIC:  Negative for lower extremity edema, bilaterally.  NEUROLOGIC: Alert and oriented to person, place, and time.  strength intact. No focal neuro deficits appreciated  SKIN: Warm, dry, intact. No jaundice noted. Negative for suspicious lesions, rashes, bruising, open sores or abrasions.     Discharge Disposition   Discharged to home  Condition at discharge: Stable    Consultations This Hospital Stay   CARDIOLOGY IP CONSULT    Time Spent on this Encounter   I, Galina Crooks, personally saw the patient today and spent greater than 30 minutes discharging this patient.    Discharge Orders     Reason for your hospital stay   You were hospitalized for further evaluation and treatment of chest pain and posterior headache.     Follow-up and recommended labs and tests    Follow up with primary care provider, Sonia Rodriguez, within 7 days for hospital follow- up.  No follow up labs or test are needed.     Activity   Your activity upon discharge: activity as tolerated     Discharge Instructions   1) Follow-up with your PCP in the next week to discuss hospitalization   2) Pain control with tylenol   3) Resume prior to admission medications, no changes made.     Full Code     Diet   Follow this diet upon discharge: Regular diet        Discharge Medications   Current Discharge Medication List      CONTINUE these medications which have NOT CHANGED    Details   metoprolol (TOPROL-XL) 50 MG 24 hr tablet TAKE ONE TABLET BY MOUTH ONE TIME DAILY   Qty: 90 tablet, Refills: 2    Associated Diagnoses: Hypertension, goal below 140/90      aspirin 81 MG chewable tablet Take 1 tablet (81 mg) by mouth daily  Qty: 90 tablet, Refills: 3    Associated Diagnoses: S/P repair of coarctation of aorta; Coarctation of aorta      simvastatin (ZOCOR) 20 MG tablet Take 1 tablet (20 mg) by mouth At Bedtime  Qty: 90 tablet, Refills: 3    Associated Diagnoses: S/P repair of coarctation of aorta; Coarctation of aorta           Allergies   Allergies   Allergen Reactions     Penicillins Rash     Was a very red facial rash     Erythromycin      Severe vomiting     Ketorolac      Levaquin [Levofloxacin]      Nitroglycerin      No Clinical Screening - See Comments      Torasol     Doxycycline Rash     Data   Most Recent 3 CBC's:  Recent Labs   Lab Test  10/13/17   1055  10/12/17   1600  10/10/16   1150   WBC  5.1  8.2  8.5   HGB  13.2  13.5  14.3   MCV  86  86  84   PLT  193  229  229      Most Recent 3 BMP's:  Recent Labs   Lab Test  10/13/17   1055  10/12/17   1600  02/16/17   0747   NA  139  140  141   POTASSIUM  4.0  4.2  3.9   CHLORIDE  106  108  106   CO2  26  27  29   BUN  11  15  12   CR  0.80  0.80  0.80   ANIONGAP  7  5  6   SAMMIE  8.9  8.9  8.7   GLC  89  92  96     Most Recent 2 LFT's:  Recent Labs   Lab Test  10/12/17   1600 07/07/15  12/04/14   1253   AST  14  27  10   ALT  23  37  22   ALKPHOS  69   --   62   BILITOTAL  0.6   --   0.6     Most Recent INR's and Anticoagulation Dosing History:  Anticoagulation Dose History     There is no flowsheet data to display.        Most Recent 3 Troponin's:  Recent Labs   Lab Test  10/13/17   0120  10/12/17   2136  10/12/17   1600   TROPI  <0.015  <0.015  <0.015     Most Recent Cholesterol Panel:  Recent Labs   Lab Test   02/16/17   0747   CHOL  135   LDL  66   HDL  52   TRIG  87     Most Recent 6 Bacteria Isolates From Any Culture (See EPIC Reports for Culture Details):  Recent Labs   Lab Test  03/31/17   1705  10/18/16   0800  09/02/16   1441  06/20/16   0945  12/31/14   1620   CULT  No growth  >100,000 colonies/mL Staphylococcus saprophyticus*  No Beta Streptococcus isolated  No growth  10,000 to 50,000 colonies/mL mixed urogenital abe     Most Recent TSH, T4 and A1c Labs:  Recent Labs   Lab Test  06/06/16   1616   TSH  2.29   T4  0.86     Results for orders placed or performed during the hospital encounter of 10/12/17   XR Chest 2 Views    Narrative    XR CHEST 2 VW 10/12/2017 4:07 PM    HISTORY: Chest pain.    COMPARISON: None.    FINDINGS: No airspace consolidation, pleural effusion or pneumothorax.  Normal heart size. What is presumably an esophageal stent is evident  in the mid thorax.      Impression    IMPRESSION: No acute cardiopulmonary abnormality.    NENITA CARDENAS MD   CTA Angiogram Chest/Abd/Pelvis w Processing    Narrative    CTA ANGIOGRAM CHEST/ABDOMEN/PELVIS WITH IMAGE PROCESSING  10/12/2017  5:55 PM     HISTORY: Coarctation, stent, chest pain radiating to arm and neck.    TECHNIQUE:  75 mL Isovue-370. CTA images of the chest, abdomen and  pelvis after nonionic contrast. Radiation dose for this scan was  reduced using automated exposure control, adjustment of the mA and/or  kV according to patient size, or iterative reconstruction technique.     COMPARISON: 10/10/2016    FINDINGS:   Aorta: Conventional three-vessel aortic arch. Proximal great vessels  are patent. Caliber of the thoracic aorta within normal limits. No  aortic dissection. There is a stent in the proximal descending  thoracic aorta for treatment of coarctation which is patent. The  abdominal aorta is patent. The visceral branches of the abdominal  aorta are patent. The iliac and common femoral arteries are patent.    Chest: No pleural or pericardial  effusion. No pneumothorax. No  pulmonary nodule or mass. No pulmonary embolism.    Abdomen: The intra-abdominal organs are normal. No bowel obstruction,  free air, or ascites.    Pelvis: No pelvic lymphadenopathy or mass.      Impression    IMPRESSION:   1. No acute aortic abnormality. CT of the chest, abdomen, and pelvis  is unremarkable other than previously stented coarctation in the  proximal descending thoracic aorta.  2. No significant discrepancy from the preliminary report provided at  8:10 PM on 10/12/2017.    KULDIP RAMIREZ MD   NM MPI Multi Rest Stress    Narrative    GATED MYOCARDIAL PERFUSION SCINTIGRAPHY EXERCISE- ONE DAY STUDY     10/13/2017 9:57 AM  YANETH WILHELM  42 years  Female   1975.    Indication/Clinical History: Chest pain    Impression  1.  Myocardial perfusion imaging using single isotope technique  demonstrated no evidence for myocardial ischemia.   2. Gated images demonstrated normal wall motion with a calculated  ejection fraction in excess of 70%.    3. There is no prior nuclear study available for comparison.    Procedure  The patient performed treadmill exercise using a Alejandro protocol,  completing 12 minutes and 0 seconds with an estimated workload of 13.4  METS.  The test was terminated due to fatigue. The heart rate was 72  beats per minute at baseline and increased to 155 beats at peak  exercise, which was 87% of the maximum predicted heart rate. The rest  blood pressure was 118/80 mm/Hg and peak blood pressure is 142/78mm/Hg  with rate pressure product of 22,010. The patient experienced no chest  pain during the test. The patient was not on a beta blocker.    Myocardial perfusion imaging was performed at rest, approximately 45  minutes after the injection intravenously of 8.4of Tc-99m Myoview. At  peak exercise, the patient was injected intravenously with 26.0 mCi of   Tc-99m Myoview and exercise continued for approximately 1 minute.  Gated post-stress  tomographic imaging was performed approximately 30  minutes after stress    EKG Findings  The resting EKG demonstrated sinus rhythm . The stress EKG  demonstrated no evidence for ischemic ST-T wave changes with exercise.    Tomographic Findings  Overall, the study quality is excellent . On the stress images, there  is relatively uniform tracer uptake. On the rest images, there is  relatively uniform tracer uptake . Gated images demonstrated normal  wall motion. The left ventricular ejection fraction was calculated to  be 85%. TID was 0.84.    VASQUEZ JULIEN MD   MR Neck w/o & w Contrast Angiogram    Narrative    MRA NECK WITHOUT AND WITH CONTRAST  10/13/2017 3:56 PM     HISTORY: Aortic coarctation with previous treatment. Hypertension and  chest pain. Intermittent headache posteriorly that developed  yesterday.    TECHNIQUE: 2D time-of-flight MR angiogram of the neck without contrast  and 3D MR angiogram of the neck with 10 mL Gadavist. Estimates of  carotid stenoses are made relative to the distal internal carotid  artery diameters except as noted.    COMPARISON: CT angiogram chest 10/12/2017.    FINDINGS:    Right Carotid:  Normal.    Left Carotid:  Normal.    Vertebral and Basilar:  Dominant right vertebral artery. Left  vertebral artery appears chronically absent with the distal left  vertebral artery supplied from the right.    Aortic Arch and Branches:  Abnormal distal aortic arch past the  subclavian artery takeoff, not well characterized on this scan.  Otherwise normal.      Impression    IMPRESSION:    1. 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 This is unchanged.  2. Abnormal distal aortic arch that may be artifactual and has no  correlate on yesterday's CT angiogram of the chest.      CROW JAMES MD   MR Head w/o Contrast Angiogram    Narrative    MR ANGIOGRAM OF THE HEAD WITHOUT CONTRAST   10/13/2017 3:56 PM     HISTORY: History of aortic  coarctation and hypertension. Presents with  chest pain and posterior headache yesterday.    TECHNIQUE: 3D time-of-flight MR angiogram of the head without  contrast.    COMPARISON: 8/3/2016    FINDINGS: The visualized portions of the distal internal carotid and  vertebral arteries, the basilar artery, [place], and the  proximal anterior, middle and posterior cerebral arteries all appear  normal. There is no evidence of aneurysm or vascular stenosis or  occlusion. Distal left vertebral artery is tiny and appears to be  supplied from the right.      Impression    IMPRESSION: Normal MR angiogram of the head. No change.      CROW JAMES MD

## 2017-10-13 NOTE — PLAN OF CARE
Problem: Patient Care Overview  Goal: Plan of Care/Patient Progress Review  Outcome: No Change   PRIOR TO DISCHARGE     Comments: List all goals to be met before discharge home:   - Serial troponins and stress test complete: partially met. Trops negative, pt going down to stress test shortly.  - Seen and cleared by consultant if applicable Goal not met, Cards consult to be completed.  - Adequate pain control on oral analgesia Goal met  - Vital signs normal Goal met  - patient baseline goal not met          Troponins neg x 3

## 2017-10-13 NOTE — PLAN OF CARE
Problem: Patient Care Overview  Goal: Plan of Care/Patient Progress Review  Outcome: No Change   PRIOR TO DISCHARGE     Comments: List all goals to be met before discharge home:   - Serial troponins and stress test complete: Partially met. Trops negative, stress test completed and results pending.   - Seen and cleared by consultant if applicable Goal not met, Cards consult to be completed.  - Adequate pain control on oral analgesia Goal met  - Vital signs normal Goal met  - patient baseline goal not met          Troponins neg x 3

## 2017-10-13 NOTE — CONSULTS
Dictation pending- discussed with PA. Negative stress and troponins and CT chest. Unlikely significant cardiac pathology/vascular pathology.   Concern re: headache ?utility of MRA head/neck.  Sign off- pls call with questions

## 2017-10-13 NOTE — ED NOTES
[facility]  ED Nurse Handoff Report    ED Chief complaint: Chest Pain (woke up with chest achiness)      ED Diagnosis:   Final diagnoses:   Acute chest pain       Code Status: Full Code    Allergies:   Allergies   Allergen Reactions     Penicillins Rash     Was a very red facial rash     Erythromycin      Severe vomiting     Ketorolac      Levaquin [Levofloxacin]      Nitroglycerin      No Clinical Screening - See Comments      Torasol     Doxycycline Rash       Activity level - Baseline/Home:  Independent    Activity Level - Current:   Independent     Needed?: No    Isolation: No  Infection: Not Applicable    Bariatric?: No    Vital Signs:   Vitals:    10/12/17 1700 10/12/17 1730 10/12/17 1904 10/12/17 2000   BP: 133/77 133/84 127/82 127/85   Pulse:       Resp: 15      Temp:       TempSrc:       SpO2: 98% 97% 99% 98%   Weight:       Height:           Cardiac Rhythm: ,        Pain level: 0-10 Pain Scale: 7    Is this patient confused?: No    Patient Report: Initial Complaint: Sindhu Bennett is a 42 year old female with history of aortic coarctation and HTN who presents with chest pain. Yesterday the patient developed an intermittent headache located on around the back of her head. The pain did not travel but it is still present today. Today, the patient woke up feeling fatigued and went to the gym to try and wake up  Focused Assessment: A & O.  VSS.  Up independently in the room.  Tele is SR.  Pain is intermittent.  LS clear.    Tests Performed: labs, CT, xray  Abnormal Results: NA  Treatments provided: Aspirin    Family Comments:  @ bedside    OBS brochure/video discussed/provided to patient: Yes    ED Medications:   Medications   aspirin tablet 325 mg (325 mg Oral Given 10/12/17 1630)   Saline Flush - CT (70 mLs Intravenous Given 10/12/17 1743)   iopamidol (ISOVUE-370) solution 75 mL (75 mLs Intravenous Given 10/12/17 1742)       Drips infusing?:  No      ED NURSE  PHONE NUMBER: [phone]

## 2017-10-13 NOTE — PROGRESS NOTES
Assessing pt for ordered Lexiscan noted the ordered urine HCG lab has not been completed. Notified EKG, Nuclear med and pts nurse on OBS unit.

## 2017-10-13 NOTE — PROGRESS NOTES
Care Coordination:    Noted pt may discharge, and her PCP is quite full in the next week.  Met with patient in room, introduced self and role.  [facility]; if d/c papers recommend followup within 7-10 days, [facility] would allow her to see her PCP.      Ynes Lord RN, BSN  Critical access hospital Care Coordinator   Mobile Phone: [phone]

## 2017-10-14 NOTE — CONSULTS
DATE OF CONSULTATION:  10/13/2017      NEW PATIENT CONSULTATION      REQUESTING PHYSICIAN:  None stated      REASON FOR CONSULTATION:  Chest pain.      HISTORY OF PRESENT ILLNESS:  Ms. Sindhu Bennett is a very pleasant 42-year-old lady who comes with complaints of chest discomfort experienced as a tightness in her chest starting about 10:00 a.m. the day of presentation.  It was initially perhaps worse with a deep breath in, but then she feels that predominantly it was just there.  She waited until 1:00 or 2:00 p.m. and then she told a coworker about it who took her to the emergency room.  At one point, her left arm started to hurt and she had pain, in fact, at the base of her skull, more on the left side, that started on Wednesday as a sharp pain and was very bad by Thursday.  It subsided this morning.  Chest pain probably subsided yesterday around 10:00 p.m. and lasted about she thinks 8-10 hours.        Ms. Bennett has a past medical history significant for coarctation of the aorta, however, with a normal trileaflet aortic valve by report.  She underwent stent placement at [facility] in 2001 for hypertension noted and coarctation with an 18 mm x 4 mm wall stent.  With her second pregnancy in 2014, she had preeclampsia and then severe hypertension following delivery.  She had been having dizzy spells, saw Neurology and noted a small stroke in her left cerebellum.  She was sent to Cardiology for followup.  Last year at her visit with Dr. KIKE Love, she had been complaining of chest discomfort and a CT was done that ruled out dissection of her aorta.  She had no evidence of cerebral aneurysm on MRI of the head last year as well, though had a tiny hypoplastic left vertebral artery and no evidence of carotid or right vertebral disease.      She was started on a statin and baby aspirin.      Here in the hospital, she underwent a CTA of the chest, abdomen and pelvis with no acute abnormalities.   She underwent an exercise sestamibi today which showed normal perfusion at 12 minutes of exercise with no chest discomfort provoked.  Cardiac enzymes and EKG remain normal.      PAST MEDICAL HISTORY:  Remarkable for:     1.  Aortic coarctation as outlined above.     2.  Endometriosis.     3.  Preeclampsia.     4.  Hypertension.     5.  Ovarian cysts.     6.  Appendectomy.     7.  Angioplasty of coarctation.     8.  Ovarian cystoscopy.      MEDICATIONS PRIOR TO ADMISSION INCLUDE:   1.  Aspirin 81 a day.     2.  Azithromycin 2 tablets on the first day and 1 for 4 days.   3.  Metoprolol 50 mg daily of XL.   4.  Zocor 20 a day.      ALLERGIES:  Penicillin, erythromycin, ketorolac, Levaquin, nitroglycerin, Toradol and doxycycline.      SOCIAL HISTORY:  Denies tobacco and drinks only occasional alcohol.      FAMILY HISTORY:  Negative for premature coronary disease.      REVIEW OF SYSTEMS:  Per admitting H&P of Dr. Inder Murphy dated 10/12/2017 included in the patient's online medical record.      PHYSICAL EXAMINATION:   VITAL SIGNS:  Blood pressure is 118/66, heart rate 67 and regular.  Oxygen saturation 99% on room air.   GENERAL:  This is a well-appearing lady in no apparent distress.  She is alert and oriented x3.   HEAD AND NECK:  Unremarkable.  Pupils are equal and reactive to light and accommodation.  Extraocular motions are intact.  Mucous membranes are moist.  There is no jaundice or icterus.   CHEST:  Clear to auscultation bilaterally without adventitious sounds.   CARDIAC:  S1, S2 are regular without clear rub or gallop.  There is a 2/6 systolic murmur audible at the base without jugular venous distention.  Carotids are normal in upstroke, volume and contour.     ABDOMEN:  Benign.  Bowel sounds are normal.   EXTREMITIES:  Without edema, clubbing or cyanosis.   NEUROLOGIC:  Motor and sensory grossly intact.   MUSCULOSKELETAL:  The patient is moving all extremities equally.   SKIN:  No petechia, purpura or  rash.      LABORATORY DATA:  Reviewed with potassium 4.0, creatinine 0.8, BUN 11.  White count 5.1, hemoglobin 13.2 and platelets 193.  EKG is not available for review.  Echocardiogram is normal with normal systolic function, no regional wall motion abnormalities.  Stress test as outlined above.      ASSESSMENT AND PLAN:  A pleasant 42-year-old lady with a history of aortic coarctation and atypical chest discomfort, negative cardiac enzymes and negative stress test as well as negative echocardiogram and negative CT of the thoracic aorta for abnormalities.  At this point, I think her chest discomfort is unlikely to be cardiovascular in etiology.  No significant pathology has been found.  Her pain is gone as well.      I will send a note to Dr. KIKE perez to see if she intended to have her followup and to make her aware of the patient's hospitalization on observation status and complaints of chest discomfort with the testing that was done.      From the standpoint of her headache, which she stated was very persistent and basilar, consideration could be given to reevaluating and her cerebral vasculature, but I would defer that to the primary service and we have discussed this.      I will sign off.  Please call with questions.      Total time is 45 minutes, 40 in coordination of care and counseling.         CATRACHITO ERAZO MD             D: 10/13/2017 19:13   T: 10/13/2017 21:08   MT:       Name:     YANETH WILHELM   MRN:      [id number]        Account:       [id number]   :      1975           Consult Date:  10/13/2017      Document: [id number]

## 2017-10-14 NOTE — PLAN OF CARE
Problem: Patient Care Overview  Goal: Plan of Care/Patient Progress Review  Outcome: Improving  Pt is A+O x 4. VSS on RA. Denied CP, neck pain. Up ind. Tele is SR. AVS instructions reviewed with pt by charge RN. Pt verbalized understanding. D/Sriram to home.

## 2017-10-16 ENCOUNTER — TELEPHONE (OUTPATIENT)
Dept: PEDIATRICS | Facility: CLINIC | Age: 42
End: 2017-10-16

## 2017-10-17 NOTE — TELEPHONE ENCOUNTER
Called patient, she feels completely normal now.  Feels she can wait until 10/25/17 to see Dr. Rodriguez.  Advised to go to the hospital if symptoms return, she agrees to plan.     Scheduled on 10/25/17 with Dr. Rodriguez.    Apple Mccarty RN, [facility]

## 2017-10-27 ENCOUNTER — OFFICE VISIT (OUTPATIENT)
Dept: PEDIATRICS | Facility: CLINIC | Age: 42
End: 2017-10-27
Payer: COMMERCIAL

## 2017-10-27 VITALS
HEART RATE: 70 BPM | WEIGHT: 142 LBS | TEMPERATURE: 96.5 F | DIASTOLIC BLOOD PRESSURE: 84 MMHG | SYSTOLIC BLOOD PRESSURE: 138 MMHG | BODY MASS INDEX: 24.36 KG/M2 | OXYGEN SATURATION: 100 %

## 2017-10-27 DIAGNOSIS — Z09 HOSPITAL DISCHARGE FOLLOW-UP: Primary | ICD-10-CM

## 2017-10-27 DIAGNOSIS — F43.0 ACUTE REACTION TO STRESS: ICD-10-CM

## 2017-10-27 DIAGNOSIS — Z23 NEEDS FLU SHOT: ICD-10-CM

## 2017-10-27 PROCEDURE — 99213 OFFICE O/P EST LOW 20 MIN: CPT | Mod: 25 | Performed by: INTERNAL MEDICINE

## 2017-10-27 PROCEDURE — 90686 IIV4 VACC NO PRSV 0.5 ML IM: CPT | Performed by: INTERNAL MEDICINE

## 2017-10-27 PROCEDURE — 90471 IMMUNIZATION ADMIN: CPT | Performed by: INTERNAL MEDICINE

## 2017-10-27 NOTE — NURSING NOTE
Injectable Influenza Immunization Documentation    1.  Is the person to be vaccinated sick today?  No    2. Does the person to be vaccinated have an allergy to eggs or to a component of the vaccine?  No    3. Has the person to be vaccinated today ever had a serious reaction to influenza vaccine in the past?  No    4. Has the person to be vaccinated ever had Guillain-Watson syndrome?  No     Form completed by Mireille BHATIA

## 2017-10-27 NOTE — PROGRESS NOTES
SUBJECTIVE:   Sindhu Bennett is a 42 year old female who presents to clinic today for the following health issues:    Patient with history of coarctation repair and CVA who was admitted for headache and chest pain last week. She had extensive workup including brain MRI/A and stress test. All were negative, as with the labs. Her symptoms resolved spontaneously as of last Friday and no recurrence.     In retrospect, the only stressor she could think of is her  and 16 year old son were going to visit colleges the following week. She felt stressed and anxious about her son growing up to be a man, leaving the house.     Patient also reports period getting lighter, lasting 2-3 days. Sex drive decreased some, from 4-5 times a week to 2-3 times a week. Life is busy. Has two little ones at home ages 3 and 5. Very often by the time she settles the kids down, she falls asleep on the couch.       Hospital Follow-up Visit:    Hospital/Nursing Home/IP Rehab Facility: [facility]  Date of Admission: 10-12-17  Date of Discharge: 10-13-17  Reason(s) for Admission: Chest pain headache            Problems taking medications regularly:  None       Medication changes since discharge: None       Problems adhering to non-medication therapy:  None    Summary of hospitalization:  [facility] discharge summary reviewed  Diagnostic Tests/Treatments reviewed.  Follow up needed: none  Other Healthcare Providers Involved in Patient s Care:         None  Update since discharge: improved.     Post Discharge Medication Reconciliation: discharge medications reconciled, continue medications without change.  Plan of care communicated with patient     Coding guidelines for this visit:  Type of Medical   Decision Making Face-to-Face Visit       within 7 Days of discharge Face-to-Face Visit        within 14 days of discharge   Moderate Complexity [id number] [id number]   High Complexity 43407 09729              Current  Outpatient Prescriptions on File Prior to Visit:  metoprolol (TOPROL-XL) 50 MG 24 hr tablet TAKE ONE TABLET BY MOUTH ONE TIME DAILY    aspirin 81 MG chewable tablet Take 1 tablet (81 mg) by mouth daily   simvastatin (ZOCOR) 20 MG tablet Take 1 tablet (20 mg) by mouth At Bedtime     No current facility-administered medications on file prior to visit.       Problem list, Medication list, Allergies, and Medical/Social/Surgical histories reviewed in [facility] and updated as appropriate.    OBJECTIVE:                                                    /84  Pulse 70  Temp 96.5  F (35.8  C) (Temporal)  Wt 142 lb (64.4 kg)  SpO2 100%  BMI 24.36 kg/m2    GEN: healthy, alert and no distress       ASSESSMENT/PLAN:                                                      42 year old female with the following diagnoses and treatment plan:      ICD-10-CM    1. Hospital discharge follow-up Z09    2. Needs flu shot Z23 HC FLU VAC PRESRV FREE QUAD SPLIT VIR 3+YRS IM     ADMIN 1st VACCINE   3. Acute reaction to stress F43.0        -- difficult to know if the headache and chest pain is the result of stress but in light of her decreased sex drive, there is likely a stress reaction. She will continue to monitor.   -- return in December for physical and pap.     Will call or return to clinic if worsening or symptoms not improving as discussed.  See Patient Instructions.        Sonia Rodriguez MD-PhD  [facility]    (Note: Chart documentation was done in part with Dragon Voice Recognition software. Although reviewed after completion, some word and grammatical errors may remain.)

## 2017-10-27 NOTE — MR AVS SNAPSHOT
After Visit Summary   10/27/2017    Sindhu Bennett    MRN: [id number]           Patient Information     Date Of Birth          1975        Visit Information        Provider Department      10/27/2017 9:10 AM Sonia Rodriguez MD PhD [facility]        Today's Diagnoses     Hospital discharge follow-up    -  1    Needs flu shot          Care Instructions    Make appointment(s) for:   -- physical in December.                   Follow-ups after your visit        Who to contact     If you have questions or need follow up information about today's clinic visit or your schedule please contact [facility] directly at [phone].  Normal or non-critical lab and imaging results will be communicated to you by Kuldathart, letter or phone within 4 business days after the clinic has received the results. If you do not hear from us within 7 days, please contact the clinic through Kuldathart or phone. If you have a critical or abnormal lab result, we will notify you by phone as soon as possible.  Submit refill requests through Ayannah or call your pharmacy and they will forward the refill request to us. Please allow 3 business days for your refill to be completed.          Additional Information About Your Visit        MyChart Information     Ayannah gives you secure access to your electronic health record. If you see a primary care provider, you can also send messages to your care team and make appointments. If you have questions, please call your primary care clinic.  If you do not have a primary care provider, please call [phone] and they will assist you.      Ayannah is an electronic gateway that provides easy, online access to your medical records. With Ayannah, you can request a clinic appointment, read your test results, renew a prescription or communicate with your care team.     To access your existing account, please contact your [facility]inic or call [phone] for assistance.        Care EveryWhere ID     This is your Care EveryWhere ID. This could be used by other organizations to access your [place] medical records  [id number]        Your Vitals Were     Pulse Temperature Pulse Oximetry BMI (Body Mass Index)          70 96.5  F (35.8  C) (Temporal) 100% 24.36 kg/m2         Blood Pressure from Last 3 Encounters:   10/27/17 138/84   10/13/17 131/83   09/01/17 125/70    Weight from Last 3 Encounters:   10/27/17 142 lb (64.4 kg)   10/12/17 142 lb 3.2 oz (64.5 kg)   09/01/17 143 lb 1.6 oz (64.9 kg)              We Performed the Following     ADMIN 1st VACCINE     HC FLU VAC PRESRV FREE QUAD SPLIT VIR 3+YRS IM        Primary Care Provider Office Phone # Fax #    Sonia Rodriguez MD PhD [phone]       [address]  [facility] [address]        Equal Access to Services     SHREYA [facility] Center[facility] AH: Hadii aad ku hadasho Soomaali, waaxda luqadaha, qaybta kaalmada adeegyada, waxay idiin haybrainn quentin kharaeleanor pinzon . So [facility] [phone].    ATENCIÓN: Si habla español, tiene a peralta disposición servicios gratuitos de asistencia lingüística. Llame al [phone].    We comply with applicable federal civil rights laws and [place] laws. We do not discriminate on the basis of race, color, national origin, age, disability, sex, sexual orientation, or gender identity.            Thank you!     Thank you for choosing [facility]  for your care. Our goal is always to provide you with excellent care. Hearing back from our patients is one way we can continue to improve our services. Please take a few minutes to complete the written survey that you may receive in the mail after your visit with us. Thank you!             Your Updated Medication List - Protect others around you: Learn how to safely use, store and throw away your medicines at www.disposemymeds.org.          This list is accurate as of: 10/27/17  9:53 AM.  Always use your most  recent med list.                   Brand Name Dispense Instructions for use Diagnosis    aspirin 81 MG chewable tablet     90 tablet    Take 1 tablet (81 mg) by mouth daily    S/P repair of coarctation of aorta, Coarctation of aorta       metoprolol 50 MG 24 hr tablet    TOPROL-XL    90 tablet    TAKE ONE TABLET BY MOUTH ONE TIME DAILY    Hypertension, goal below 140/90       simvastatin 20 MG tablet    ZOCOR    90 tablet    Take 1 tablet (20 mg) by mouth At Bedtime    S/P repair of coarctation of aorta, Coarctation of aorta

## 2017-10-31 DIAGNOSIS — I10 HYPERTENSION, GOAL BELOW 140/90: ICD-10-CM

## 2017-10-31 RX ORDER — METOPROLOL SUCCINATE 50 MG/1
TABLET, EXTENDED RELEASE ORAL
Qty: 90 TABLET | Refills: 3 | Status: SHIPPED | OUTPATIENT
Start: 2017-10-31 | End: 2018-10-24

## 2017-10-31 NOTE — TELEPHONE ENCOUNTER
metoprolol (TOPROL-XL) 50 MG 24 hr tablet     Last Written Prescription Date: 2/6/2017  Last Fill Quantity: 90, # refills: 2    Last Office Visit with [facility], [facility] or [facility] prescribing provider:  10/27/2017   Future Office Visit:    Next 5 appointments (look out 90 days)     Dec 11, 2017  7:30 AM CST   PHYSICAL with Sonia Rodriguez MD PhD   [facility] ([facility])    [address] [place] [address] [phone]                    BP Readings from Last 3 Encounters:   10/27/17 138/84   10/13/17 131/83   09/01/17 125/70     Refilled per [facility] protocol.    Toyin Keen RN

## 2017-11-03 DIAGNOSIS — Z87.74 S/P REPAIR OF COARCTATION OF AORTA: ICD-10-CM

## 2017-11-03 DIAGNOSIS — Q25.1 COARCTATION OF AORTA: ICD-10-CM

## 2017-11-03 RX ORDER — SIMVASTATIN 20 MG
20 TABLET ORAL AT BEDTIME
Qty: 90 TABLET | Refills: 1 | Status: SHIPPED | OUTPATIENT
Start: 2017-11-03 | End: 2018-02-08

## 2017-11-03 NOTE — TELEPHONE ENCOUNTER
Simvastatin     Last Written Prescription Date: 11-10-16  Last Fill Quantity: 90, # refills: 3  Last Office Visit with [facility], [facility] or [facility] prescribing provider: 10-27-17  Next 5 appointments (look out 90 days)     Dec 11, 2017  7:30 AM CST   PHYSICAL with Snoia Rodriguez MD PhD   [facility] ([facility])    [address] [place] [address] [phone]                   Lab Results   Component Value Date    CHOL 135 02/16/2017     Lab Results   Component Value Date    HDL 52 02/16/2017     Lab Results   Component Value Date    LDL 66 02/16/2017     Lab Results   Component Value Date    TRIG 87 02/16/2017     Lab Results   Component Value Date    CHOLHDLRATIO 4.3 09/26/2014

## 2017-11-28 ENCOUNTER — OFFICE VISIT (OUTPATIENT)
Dept: FAMILY MEDICINE | Facility: CLINIC | Age: 42
End: 2017-11-28
Payer: COMMERCIAL

## 2017-11-28 VITALS
RESPIRATION RATE: 18 BRPM | BODY MASS INDEX: 24.59 KG/M2 | HEIGHT: 64 IN | WEIGHT: 144 LBS | TEMPERATURE: 98.3 F | SYSTOLIC BLOOD PRESSURE: 118 MMHG | DIASTOLIC BLOOD PRESSURE: 74 MMHG | HEART RATE: 64 BPM | OXYGEN SATURATION: 99 %

## 2017-11-28 DIAGNOSIS — R07.0 THROAT PAIN: ICD-10-CM

## 2017-11-28 DIAGNOSIS — J02.9 VIRAL PHARYNGITIS: Primary | ICD-10-CM

## 2017-11-28 LAB
DEPRECATED S PYO AG THROAT QL EIA: NORMAL
SPECIMEN SOURCE: NORMAL

## 2017-11-28 PROCEDURE — 99213 OFFICE O/P EST LOW 20 MIN: CPT | Performed by: PHYSICIAN ASSISTANT

## 2017-11-28 PROCEDURE — 87081 CULTURE SCREEN ONLY: CPT | Performed by: PHYSICIAN ASSISTANT

## 2017-11-28 PROCEDURE — 87880 STREP A ASSAY W/OPTIC: CPT | Performed by: PHYSICIAN ASSISTANT

## 2017-11-28 ASSESSMENT — PAIN SCALES - GENERAL: PAINLEVEL: WORST PAIN (10)

## 2017-11-28 NOTE — MR AVS SNAPSHOT
After Visit Summary   11/28/2017    Sindhu Bennett    MRN: [id number]           Patient Information     Date Of Birth          1975        Visit Information        Provider Department      11/28/2017 8:00 AM Sahara Howard PA-C [facility]        Today's Diagnoses     Throat pain    -  1      Care Instructions    Follow up with us if no improvement over the next week.   Return urgently if any change in symptoms like increasing cough, persistent fever, shortness of breath or other change in symptoms.    At [facility], we strive to deliver an exceptional experience to you, every time we see you.  If you receive a survey in the mail, please send us back your thoughts. We really do value your feedback.    Based on your medical history, these are the current health maintenance/preventive care services that you are due for (some may have been done at this visit.)  Health Maintenance Due   Topic Date Due     MICROALBUMIN Q1 YEAR  09/28/2016         Suggested websites for health information:  Www.9Lenses.CrowdTransfer : Up to date and easily searchable information on multiple topics.  Www.medlineplus.gov : medication info, interactive tutorials, watch real surgeries online  Www.familydoctor.org : good info from the Academy of Family Physicians  Www.cdc.gov : public health info, travel advisories, epidemics (H1N1)  Www.aap.org : children's health info, normal development, vaccinations  Www.health.state[place].us : [place] dept of health, public health issues in [place], N1N1    Your care team:     Family Medicine   KASHMIR Burdick MD Emily Bunt, APRN CNP   S. MD Shoshana Griffith MD Angela Wermerskirchen, MD         Clinic hours: Monday - Wednesday 7 am-7 pm   Thursdays and Fridays 7 am-5 pm.     [facility]: Monday - Friday 11 am-9 pm,   Saturday and Sunday 9 am-5 pm.    [facility]: Monday -Thursday 8  am-8 pm; Friday 8 am-6 pm; Saturday and Sunday 9 am-5 pm.     [facility]: Monday - Thursday 8 am - 7 pm; Friday 8 am - 6 pm    Clinic: [phone]   [facility]: [phone]   [facility]: [phone]            Follow-ups after your visit        Your next 10 appointments already scheduled     Dec 11, 2017  7:30 AM CST   PHYSICAL with Sonia Rodriguez MD PhD   [facility] ([facility])    [address] [address] [place] [address] [phone]              Who to contact     If you have questions or need follow up information about today's clinic visit or your schedule please contact [facility] directly at [phone].  Normal or non-critical lab and imaging results will be communicated to you by MyChart, letter or phone within 4 business days after the clinic has received the results. If you do not hear from us within 7 days, please contact the clinic through VuPoynt Media Grouphart or phone. If you have a critical or abnormal lab result, we will notify you by phone as soon as possible.  Submit refill requests through ReliantHeart or call your pharmacy and they will forward the refill request to us. Please allow 3 business days for your refill to be completed.          Additional Information About Your Visit        MyChart Information     ReliantHeart gives you secure access to your electronic health record. If you see a primary care provider, you can also send messages to your care team and make appointments. If you have questions, please call your primary care clinic.  If you do not have a primary care provider, please call [phone] and they will assist you.        Care EveryWhere ID     This is your Care EveryWhere ID. This could be used by other organizations to access your [place] medical records  [id number]        Your Vitals Were     Pulse Temperature Respirations Height Last Period Pulse Oximetry    64 98.3  F (36.8  C)  "(Oral) 18 1.613 m (5' 3.5\") 11/28/2017 (Exact Date) 99%    Breastfeeding? BMI (Body Mass Index)                No 25.11 kg/m2           Blood Pressure from Last 3 Encounters:   11/28/17 118/74   10/27/17 138/84   10/13/17 131/83    Weight from Last 3 Encounters:   11/28/17 65.3 kg (144 lb)   10/27/17 64.4 kg (142 lb)   10/12/17 64.5 kg (142 lb 3.2 oz)              We Performed the Following     Beta strep group A culture     Strep, Rapid Screen        Primary Care Provider Office Phone # Fax #    Sonia Rodriguez MD PhD [phone]       [address]  [facility] [address]        Equal Access to Services     MALLIKA JEROME : Hadii aad ku hadasho Sovanessa, waaxda luqadaha, qaybta kaalmada adeegyada, shannan pinzon . So [facility] [phone].    ATENCIÓN: Si habla español, tiene a peralta disposición servicios gratuitos de asistencia lingüística. Llame al [phone].    We comply with applicable federal civil rights laws and [place] laws. We do not discriminate on the basis of race, color, national origin, age, disability, sex, sexual orientation, or gender identity.            Thank you!     Thank you for choosing [facility]  for your care. Our goal is always to provide you with excellent care. Hearing back from our patients is one way we can continue to improve our services. Please take a few minutes to complete the written survey that you may receive in the mail after your visit with us. Thank you!             Your Updated Medication List - Protect others around you: Learn how to safely use, store and throw away your medicines at www.disposemymeds.org.          This list is accurate as of: 11/28/17  8:36 AM.  Always use your most recent med list.                   Brand Name Dispense Instructions for use Diagnosis    aspirin 81 MG chewable tablet     90 tablet    Take 1 tablet (81 mg) by mouth daily    S/P repair of coarctation of aorta, Coarctation of aorta       " metoprolol 50 MG 24 hr tablet    TOPROL-XL    90 tablet    TAKE ONE TABLET BY MOUTH ONE TIME DAILY    Hypertension, goal below 140/90       simvastatin 20 MG tablet    ZOCOR    90 tablet    Take 1 tablet (20 mg) by mouth At Bedtime    S/P repair of coarctation of aorta, Coarctation of aorta

## 2017-11-28 NOTE — NURSING NOTE
"Chief Complaint   Patient presents with     Pharyngitis       Initial /74 (BP Location: Right arm, Patient Position: Chair, Cuff Size: Adult Regular)  Pulse 64  Temp 98.3  F (36.8  C) (Oral)  Resp 18  Ht 1.613 m (5' 3.5\")  Wt 65.3 kg (144 lb)  LMP 11/28/2017 (Exact Date)  SpO2 99%  Breastfeeding? No  BMI 25.11 kg/m2 Estimated body mass index is 25.11 kg/(m^2) as calculated from the following:    Height as of this encounter: 1.613 m (5' 3.5\").    Weight as of this encounter: 65.3 kg (144 lb).  Medication Reconciliation: complete     Annia Chatman MA       "

## 2017-11-28 NOTE — PATIENT INSTRUCTIONS
Follow up with us if no improvement over the next week.   Return urgently if any change in symptoms like increasing cough, persistent fever, shortness of breath or other change in symptoms.    At [facility], we strive to deliver an exceptional experience to you, every time we see you.  If you receive a survey in the mail, please send us back your thoughts. We really do value your feedback.    Based on your medical history, these are the current health maintenance/preventive care services that you are due for (some may have been done at this visit.)  Health Maintenance Due   Topic Date Due     MICROALBUMIN Q1 YEAR  09/28/2016         Suggested websites for health information:  Www.turboBOTZ.org : Up to date and easily searchable information on multiple topics.  Www.medlineplus.gov : medication info, interactive tutorials, watch real surgeries online  Www.familydoctor.org : good info from the Academy of Family Physicians  Www.cdc.gov : public health info, travel advisories, epidemics (H1N1)  Www.aap.org : children's health info, normal development, vaccinations  Www.health.[facility].[place].us : [place] dept of health, public health issues in [place], N1N1    Your care team:     Family Medicine   KASHMIR Burdick MD Emily Bunt, TREVOR DUNAWAY   S. MD Shoshana Griffith MD Angela Wermerskirchen, MD         Clinic hours: Monday - Wednesday 7 am-7 pm   Thursdays and Fridays 7 am-5 pm.     [facility]: Monday - Friday 11 am-9 pm,   Saturday and Sunday 9 am-5 pm.    [facility]: Monday -Thursday 8 am-8 pm; Friday 8 am-6 pm; Saturday and Sunday 9 am-5 pm.     [facility]: Monday - Thursday 8 am - 7 pm; Friday 8 am - 6 pm    Clinic: [phone]   [facility]: [phone]   [facility]: [phone]

## 2017-11-28 NOTE — PROGRESS NOTES
SUBJECTIVE:   Sindhu Bennett is a 42 year old female who presents to clinic today for the following health issues:    Acute Illness   Acute illness concerns: sore throat  Onset: Saturday    Fever: YES    Chills/Sweats: YES    Headache (location?): YES    Sinus Pressure:no    Conjunctivitis:  no    Ear Pain: no    Rhinorrhea: no    Congestion: no    Sore Throat: YES     Cough: YES-non-productive    Wheeze: no    Decreased Appetite: YES    Nausea: YES    Vomiting: no    Diarrhea:  no    Dysuria/Freq.: no    Fatigue/Achiness: yes    Sick/Strep Exposure: no   Therapies Tried and outcome: aspirin, halls , tea and honey        Sore throat for 3-4 days.  Had cough and nasal congestion which has improved.  Had temp of 101 yesterday and took aspirin this am and down to 98.    Complains of body aches and sore throat.     Problem list and histories reviewed & adjusted, as indicated.  Additional history: as documented    Patient Active Problem List   Diagnosis     Coarctation of aorta     Essential hypertension     CARDIOVASCULAR SCREENING; LDL GOAL LESS THAN 130     HSV infection     S/P repair of coarctation of aorta     Hypertension, goal below 140/90     Past Surgical History:   Procedure Laterality Date     ANGIOPLASTY       APPENDECTOMY  2006     BIOPSY  '96 & '98    when cyst were removed     C LAP OVARIAN CYSTOTOMY      4 different surgeries       Social History   Substance Use Topics     Smoking status: Never Smoker     Smokeless tobacco: Never Used     Alcohol use Yes     Family History   Problem Relation Age of Onset     Hypertension Mother      DIABETES Father      Hypertension Father      Cancer - colorectal Maternal Grandfather      C.A.D. Maternal Grandfather      DIABETES Maternal Grandfather      Hypertension Maternal Grandfather      C.A.D. Maternal Uncle      CEREBROVASCULAR DISEASE Maternal Uncle      Hypertension Maternal Uncle      DIABETES Maternal Uncle      DIABETES Maternal Grandmother   "    Multiple Sclerosis Maternal Grandmother      Hypertension Brother      Colon Cancer Paternal Grandfather      Coronary Artery Disease Paternal Uncle      Asthma No family hx of      Breast Cancer No family hx of      Prostate Cancer No family hx of      Thyroid Disease No family hx of      Genetic Disorder No family hx of          Current Outpatient Prescriptions   Medication Sig Dispense Refill     simvastatin (ZOCOR) 20 MG tablet Take 1 tablet (20 mg) by mouth At Bedtime 90 tablet 1     metoprolol (TOPROL-XL) 50 MG 24 hr tablet TAKE ONE TABLET BY MOUTH ONE TIME DAILY  90 tablet 3     aspirin 81 MG chewable tablet Take 1 tablet (81 mg) by mouth daily 90 tablet 3         Reviewed and updated as needed this visit by clinical staff     Reviewed and updated as needed this visit by Provider         ROS:  Constitutional, HEENT, cardiovascular, pulmonary, gi and gu systems are negative, except as otherwise noted.      OBJECTIVE:   /74 (BP Location: Right arm, Patient Position: Chair, Cuff Size: Adult Regular)  Pulse 64  Temp 98.3  F (36.8  C) (Oral)  Resp 18  Ht 1.613 m (5' 3.5\")  Wt 65.3 kg (144 lb)  LMP 11/28/2017 (Exact Date)  SpO2 99%  Breastfeeding? No  BMI 25.11 kg/m2  Body mass index is 25.11 kg/(m^2).  GENERAL: healthy, alert and no distress  EYES: Eyes grossly normal to inspection, PERRL and conjunctivae and sclerae normal  HENT: ear canals and TM's normal, nose and mouth without ulcers or lesions  NECK: no adenopathy, no asymmetry, masses, or scars and thyroid normal to palpation  RESP: lungs clear to auscultation - no rales, rhonchi or wheezes  CV: regular rate and rhythm, normal S1 S2, no S3 or S4, no murmur, click or rub, no peripheral edema and peripheral pulses strong  MS: no gross musculoskeletal defects noted, no edema    Diagnostic Test Results:  Results for orders placed or performed in visit on 11/28/17 (from the past 24 hour(s))   Strep, Rapid Screen   Result Value Ref Range    " Specimen Description Throat     Rapid Strep A Screen       NEGATIVE: No Group A streptococcal antigen detected by immunoassay, await culture report.       ASSESSMENT/PLAN:             1. Viral pharyngitis  Continue symptomatic cares.  Follow up with us if no improvement over the week.  Return urgently if any change in symptoms.      2. Throat pain    - Strep, Rapid Screen  - Beta strep group A culture    Patient Instructions     Follow up with us if no improvement over the next week.   Return urgently if any change in symptoms like increasing cough, persistent fever, shortness of breath or other change in symptoms.    At [facility], we strive to deliver an exceptional experience to you, every time we see you.  If you receive a survey in the mail, please send us back your thoughts. We really do value your feedback.    Based on your medical history, these are the current health maintenance/preventive care services that you are due for (some may have been done at this visit.)  Health Maintenance Due   Topic Date Due     MICROALBUMIN Q1 YEAR  09/28/2016         Suggested websites for health information:  Www.[place].org : Up to date and easily searchable information on multiple topics.  Www.medlineplus.gov : medication info, interactive tutorials, watch real surgeries online  Www.familydoctor.org : good info from the Academy of Family Physicians  Www.cdc.gov : public health info, travel advisories, epidemics (H1N1)  Www.aap.org : children's health info, normal development, vaccinations  Www.health.state.[place].us : [place] dept of health, public health issues in [place], N1N1    Your care team:     Family Medicine   KASHMIR Burdick MD Emily Bunt, APRN [facility]   S. MD Shoshana Griffith MD Angela Wermerskirchen, MD         Clinic hours: Monday - Wednesday 7 am-7 pm   Thursdays and Fridays 7 am-5 pm.     [facility]: Monday - Friday 11 am-9 pm,   Saturday  and Sunday 9 am-5 pm.    [facility]: Monday -Thursday 8 am-8 pm; Friday 8 am-6 pm; Saturday and Sunday 9 am-5 pm.     [facility]: Monday - Thursday 8 am - 7 pm; Friday 8 am - 6 pm    Clinic: [phone]   [facility]: [phone]   [facility]: [phone]         Sahara Howard PA-C  [facility]

## 2017-11-29 LAB
BACTERIA SPEC CULT: NORMAL
SPECIMEN SOURCE: NORMAL

## 2017-11-29 NOTE — PROGRESS NOTES
Esau Manley  Your strep culture was negative.   Please call or MyChart my office with any questions or concerns.    Sahara Howard, PAC

## 2017-12-05 ENCOUNTER — TELEPHONE (OUTPATIENT)
Dept: PEDIATRICS | Facility: CLINIC | Age: 42
End: 2017-12-05

## 2017-12-05 ENCOUNTER — OFFICE VISIT (OUTPATIENT)
Dept: PEDIATRICS | Facility: CLINIC | Age: 42
End: 2017-12-05
Payer: COMMERCIAL

## 2017-12-05 VITALS
OXYGEN SATURATION: 100 % | SYSTOLIC BLOOD PRESSURE: 141 MMHG | WEIGHT: 140.6 LBS | HEART RATE: 57 BPM | BODY MASS INDEX: 24.52 KG/M2 | DIASTOLIC BLOOD PRESSURE: 96 MMHG | TEMPERATURE: 96.9 F

## 2017-12-05 DIAGNOSIS — B02.9 HERPES ZOSTER WITHOUT COMPLICATION: Primary | ICD-10-CM

## 2017-12-05 PROCEDURE — 99213 OFFICE O/P EST LOW 20 MIN: CPT | Performed by: NURSE PRACTITIONER

## 2017-12-05 RX ORDER — VALACYCLOVIR HYDROCHLORIDE 1 G/1
1000 TABLET, FILM COATED ORAL 3 TIMES DAILY
Qty: 21 TABLET | Refills: 0 | Status: SHIPPED | OUTPATIENT
Start: 2017-12-05 | End: 2018-01-02

## 2017-12-05 ASSESSMENT — PAIN SCALES - GENERAL: PAINLEVEL: NO PAIN (0)

## 2017-12-05 NOTE — PATIENT INSTRUCTIONS
PLAN:   1.   Symptomatic therapy suggested: rest, increase fluids, OTC acetaminophen, ibuprofen and call prn if symptoms persist or worsen.  2.  Orders Placed This Encounter   Medications     valACYclovir (VALTREX) 1000 mg tablet     Sig: Take 1 tablet (1,000 mg) by mouth 3 times daily     Dispense:  21 tablet     Refill:  0     3. Patient needs to follow up in if no improvement,or sooner if worsening of symptoms or other symptoms develop.  Shingles (Herpes Zoster)     Talk to your healthcare provider about the shingles vaccine.     Shingles is also called herpes zoster. It is a painful skin rash caused by the herpes zoster virus. This is the same virus that causes chickenpox. After a person has chickenpox, the virus remains inactive in the nerve cells. Years later, the virus can become active again and travel to the skin. Most people have shingles only once, but it is possible to have it more than once.  What are the risk factors for shingles?  Anyone who has ever had chickenpox can develop shingles. But your risk is greater if you:    Are 50 years of age or older    Have an illness that weakens your immune system, such as HIV/AIDS    Have cancer, especially Hodgkin disease or lymphoma    Take medicines that weaken your immune system  What are the symptoms of shingles?    The first sign of shingles is usually pain, burning, tingling, or itching on one part of your face or body. You may also feel as if you have the flu, with fever and chills.    A red rash with small blisters appears within a few days. The rash may appear as follows:     The blisters can occur anywhere, but they re most common on the back, chest, or abdomen.    They usually appear on only one side of the body, spreading along the nerve pathway where the virus was inactive.     The rash can also form around an eye, along one side of the face or neck, or in the mouth.    In a few people, usually those with weakened immune systems, shingles appear on  more than one part of the body at once.    After a few days, the blisters become dry and form a crust. The crust falls off in days to weeks. The blisters generally do not leave scars.  How is shingles treated?  For most people, shingles heals on its own in a few weeks. But treatment is recommended to help relieve pain, speed healing, and reduce the risk of complications. Antiviral medicines are prescribed within the first 72 hours of the appearance of the rash. To lessen symptoms:    Apply ice packs (wrapped in a thin towel) or cool compresses, or soak in a cool bath.    Use calamine lotion to calm itchy skin.    Ask your healthcare provider about over-the-counter pain relievers. If your pain is severe, your healthcare provider may prescribe stronger pain medicines.  What are the complications of shingles?  Shingles often goes away with no lasting effects. But some people have serious problems long after the blisters have healed:    Postherpetic neuralgia. This is the most common complication. It is severe nerve pain at the place where the rash used to be. It can last for months, or even years after you have had shingles. Medicines can be prescribed to help relieve the pain and improve quality of life.    Bacterial infection. Shingles blisters may become infected with bacteria. Antibiotic medicine is used to treat the infection.    Eye problems. A person with shingles on the face should see his or her healthcare provider right away. Shingles can cause serious problems with vision, and even blindness.  Very rarely shingles can also lead to pneumonia, hearing problems, brain inflammation, or even death.   When to seek medical care  Contact your healthcare provider if you experience any of the following:    Symptoms that don t go away with treatment    A rash or blisters near your eye    Increased drainage, fever, or rash after treatment, or severe pain that doesn t go away   How can shingles be prevented?  You can  only get shingles if you have had chicken pox in the past. Those who have never had chickenpox can get the virus from you. Although instead of developing shingles, the person may get chickenpox. Until your blisters form scabs, avoid contact with others, especially the following:    Pregnant women who have never had chickenpox or the vaccine    Infants who were born early (prematurely) or who had low weight at birth    People with weak immune system (for example, people receiving chemotherapy for cancer, people who have had organ transplants, or people with HIV infections)     The shingles vaccine  If you re 60 years of age or older, ask your healthcare provider if you should receive the shingles vaccine. The vaccine makes it less likely that you will develop shingles. If you do develop shingles, your symptoms will likely be milder than if you hadn t been vaccinated. Note: Although the vaccine is licensed for people 50 years of age or older, the CDC does not recommend the vaccine for those who are 50 to 59 years old.   Date Last Reviewed: 10/1/2016    9257-1232 [employer]. [address] [place] [address]. All rights reserved. This information is not intended as a substitute for professional medical care. Always follow your healthcare professional's instructions.    It was a pleasure seeing you today at the [facility]. Thank you for allowing us to care for you today. We truly hope we provided you with the excellent service you deserve. Please let us know if there is anything else we can do for you so we can be sure you are leaving completley satisfied with your care experience.       General information about your clinic   Clinic Hours Lab Hours (Appointments are required)   Mon-Thurs: 7:30 AM - 7 PM Mon-Thurs: 7:30 AM - 7 PM   Fri: 7:30 AM - 5 PM Fri: 7:30 AM - 5 PM        After Hours Nurse Advise & Appts:  [facility]: [phone]  [facility] Lopez  Call: to make appointments anytime: [phone] On Call Physician: call [phone] and answering service will page the on call physician.        For urgent appointments, please call [phone] and ask for the triage nurse or your care team clinic nurse.  How to contact my care team:  Michelle[facility]rinidad: [email or web address]   Phone: [phone]   Fax: [phone]       [facility]:   Phone: [phone]  Hours: 8:00 AM - 6:00 PM  Medication Refills:  Call your pharmacy and they will forward the refill to us. Please allow 3 business days for your refills to be completed.       Normal or non-critical lab and imaging results will be communicated to you by MyChart, letter or phone within 7 days.  If you do not hear from us within 10 days, please call the clinic. If you have a critical or abnormal lab result, we will notify you by phone as soon as possible.       We now have PWIC (Pediatric Walk in Care)  Monday-Friday from 7:30-4. Simply walk in and be seen for your urgent needs like cough, fever, rash, diarrhea or vomiting, pink eye, UTI. No appointments needed. Ask one of the team for more information      -Your Care Team:    Dr. Sonia Rodriguez - Internal Medicine/Pediatrics   Dr. Lilly Cartagena - Family Medicine  Dr. Jazmin Fortune - Pediatrics  Dr. Mercy Comer - Pediatrics  Adrianne Navarro CNP - Family Practice Nurse Practitioner

## 2017-12-05 NOTE — PROGRESS NOTES
SUBJECTIVE:   Sindhu Bennett is a 42 year old female who presents to clinic today for the following health issues:      ED/UC Followup:    Facility:  [facility]  Date of visit: 12/3/17  Reason for visit: Bacterial Conjunctivitis - both eyes  Current Status: Patient c/o onset of blister like lesions around left eye on Monday morning. Stopped using trimethoprim-polymyxin b (POLYTRIM) 10,000 unit- 1 mg/mL Opht ophthalmic (EYE) solution due to possible allergic reaction. Patient notes ongoing, but improving eye irritation and swelling.     2 days ago went to urgent care because both eyes were matted and stuck together  Daughter had pink eye that week. Was placed on polytrim drops   Started these on Sunday and then yesterday evening started with rash under her left eye  Eyes themselves are better.  Rash does itch and is mildly uncomfortable. No burning sensation. Stopped the drops today. Has gotten better since stopping the drops.   The only that has gotten worse is the blistery rash.   Has been under a lot of stress lately.     Problem list and histories reviewed & adjusted, as indicated.  Additional history: as documented    Patient Active Problem List   Diagnosis     Coarctation of aorta     Essential hypertension     CARDIOVASCULAR SCREENING; LDL GOAL LESS THAN 130     HSV infection     S/P repair of coarctation of aorta     Hypertension, goal below 140/90     Past Surgical History:   Procedure Laterality Date     ANGIOPLASTY       APPENDECTOMY  2006     BIOPSY  '96 & '98    when cyst were removed     C LAP OVARIAN CYSTOTOMY      4 different surgeries       Social History   Substance Use Topics     Smoking status: Never Smoker     Smokeless tobacco: Never Used     Alcohol use Yes     Family History   Problem Relation Age of Onset     Hypertension Mother      DIABETES Father      Hypertension Father      Cancer - colorectal Maternal Grandfather      C.A.D. Maternal Grandfather      DIABETES  Maternal Grandfather      Hypertension Maternal Grandfather      C.A.D. Maternal Uncle      CEREBROVASCULAR DISEASE Maternal Uncle      Hypertension Maternal Uncle      DIABETES Maternal Uncle      DIABETES Maternal Grandmother      Multiple Sclerosis Maternal Grandmother      Hypertension Brother      Colon Cancer Paternal Grandfather      Coronary Artery Disease Paternal Uncle      Asthma No family hx of      Breast Cancer No family hx of      Prostate Cancer No family hx of      Thyroid Disease No family hx of      Genetic Disorder No family hx of          Current Outpatient Prescriptions   Medication Sig Dispense Refill     simvastatin (ZOCOR) 20 MG tablet Take 1 tablet (20 mg) by mouth At Bedtime 90 tablet 1     metoprolol (TOPROL-XL) 50 MG 24 hr tablet TAKE ONE TABLET BY MOUTH ONE TIME DAILY  90 tablet 3     aspirin 81 MG chewable tablet Take 1 tablet (81 mg) by mouth daily 90 tablet 3     Allergies   Allergen Reactions     Penicillins Rash     Was a very red facial rash     Erythromycin      Severe vomiting     Levaquin [Levofloxacin]      Nitroglycerin      Doxycycline Rash     Toradol [Ketorolac Tromethamine]      Labs reviewed in EPIC      Reviewed and updated as needed this visit by clinical staff       Reviewed and updated as needed this visit by Provider         ROS:  CONSTITUTIONAL:NEGATIVE for fever, chills, change in weight  EYES: POSITIVE for conjunctivitis is clearing  and NEGATIVE for blurred vision bilateral, diplopia and eye pain bilateral  ENT/MOUTH: NEGATIVE for ear, mouth and throat problems. Does have some mild runny nose   RESP:NEGATIVE for significant cough or SOB  HEME/ALLERGY/IMMUNE: POSITIVE  for allergies and NEGATIVE for chills and fever    OBJECTIVE:     BP (!) 141/96 (BP Location: Right arm, Patient Position: Sitting, Cuff Size: Adult Regular)  Pulse 57  Temp 96.9  F (36.1  C) (Oral)  Wt 140 lb 9.6 oz (63.8 kg)  LMP 11/28/2017 (Exact Date)  SpO2 100%  BMI 24.52 kg/m2  Body  mass index is 24.52 kg/(m^2).  GENERAL APPEARANCE: alert, active and no distress  HENT: ear canals and TM's normal and nose and mouth without ulcers or lesions  NECK: no adenopathy and no asymmetry, masses, or scars  RESP: lungs clear to auscultation - no rales, rhonchi or wheezes  CV: regular rates and rhythm and no murmur, click or rub  MS: extremities normal- no gross deformities noted  SKIN: negatives: temperature normal, mobility and turgor normal    Mildly Erythematous, well demarcated eruption with inlfamed papules and vessicles in a dermatomal distribution on the left face.  Superficial discomfort across dermatome including clear skin.  PSYCH: mentation appears normal and affect normal/bright    Diagnostic Test Results:  none     ASSESSMENT/PLAN:     Sindhu was seen today for hospital f/u.    Diagnoses and all orders for this visit:    Herpes zoster without complication  -     valACYclovir (VALTREX) 1000 mg tablet; Take 1 tablet (1,000 mg) by mouth 3 times daily    PLAN:   1.   Symptomatic therapy suggested: rest, increase fluids, OTC acetaminophen, ibuprofen and call prn if symptoms persist or worsen.  2.  Orders Placed This Encounter   Medications     valACYclovir (VALTREX) 1000 mg tablet     Sig: Take 1 tablet (1,000 mg) by mouth 3 times daily     Dispense:  21 tablet     Refill:  0     3. Patient needs to follow up in if no improvement,or sooner if worsening of symptoms or other symptoms develop.    See Patient Instructions    TREVOR Reynoso CNP  [facility]

## 2017-12-05 NOTE — NURSING NOTE
"Chief Complaint   Patient presents with     Hospital F/U       Initial BP (!) 141/96 (BP Location: Right arm, Patient Position: Sitting, Cuff Size: Adult Regular)  Pulse 57  Temp 96.9  F (36.1  C) (Oral)  Wt 140 lb 9.6 oz (63.8 kg)  LMP 11/28/2017 (Exact Date)  SpO2 100%  BMI 24.52 kg/m2 Estimated body mass index is 24.52 kg/(m^2) as calculated from the following:    Height as of 11/28/17: 5' 3.5\" (1.613 m).    Weight as of this encounter: 140 lb 9.6 oz (63.8 kg).  BP completed using cuff size: regular  Teri Tong, CMA    "

## 2017-12-05 NOTE — TELEPHONE ENCOUNTER
Called patient due to complaints in appt. Notes.   She states she was diagnosed with pink eye on Sunday, her daughter also has it.    She now has 2 blisters under her left eye, one is the size of 4 mm in diameter, one is forming.  There is no pus or drainage.    She has a very subtle film over the left eye.      Huddle with Melissa Navarro NP. Ok to see patient.       Apple Mccarty RN, [facility]

## 2017-12-05 NOTE — MR AVS SNAPSHOT
After Visit Summary   12/5/2017    Sindhu Bennett    MRN: [id number]           Patient Information     Date Of Birth          1975        Visit Information        Provider Department      12/5/2017 4:10 PM Adrianne Navarro APRN CNP [facility]        Today's Diagnoses     Herpes zoster without complication    -  1      Care Instructions      PLAN:   1.   Symptomatic therapy suggested: rest, increase fluids, OTC acetaminophen, ibuprofen and call prn if symptoms persist or worsen.  2.  Orders Placed This Encounter   Medications     valACYclovir (VALTREX) 1000 mg tablet     Sig: Take 1 tablet (1,000 mg) by mouth 3 times daily     Dispense:  21 tablet     Refill:  0     3. Patient needs to follow up in if no improvement,or sooner if worsening of symptoms or other symptoms develop.  Shingles (Herpes Zoster)     Talk to your healthcare provider about the shingles vaccine.     Shingles is also called herpes zoster. It is a painful skin rash caused by the herpes zoster virus. This is the same virus that causes chickenpox. After a person has chickenpox, the virus remains inactive in the nerve cells. Years later, the virus can become active again and travel to the skin. Most people have shingles only once, but it is possible to have it more than once.  What are the risk factors for shingles?  Anyone who has ever had chickenpox can develop shingles. But your risk is greater if you:    Are 50 years of age or older    Have an illness that weakens your immune system, such as HIV/AIDS    Have cancer, especially Hodgkin disease or lymphoma    Take medicines that weaken your immune system  What are the symptoms of shingles?    The first sign of shingles is usually pain, burning, tingling, or itching on one part of your face or body. You may also feel as if you have the flu, with fever and chills.    A red rash with small blisters appears within a few days. The rash may appear as  follows:     The blisters can occur anywhere, but they re most common on the back, chest, or abdomen.    They usually appear on only one side of the body, spreading along the nerve pathway where the virus was inactive.     The rash can also form around an eye, along one side of the face or neck, or in the mouth.    In a few people, usually those with weakened immune systems, shingles appear on more than one part of the body at once.    After a few days, the blisters become dry and form a crust. The crust falls off in days to weeks. The blisters generally do not leave scars.  How is shingles treated?  For most people, shingles heals on its own in a few weeks. But treatment is recommended to help relieve pain, speed healing, and reduce the risk of complications. Antiviral medicines are prescribed within the first 72 hours of the appearance of the rash. To lessen symptoms:    Apply ice packs (wrapped in a thin towel) or cool compresses, or soak in a cool bath.    Use calamine lotion to calm itchy skin.    Ask your healthcare provider about over-the-counter pain relievers. If your pain is severe, your healthcare provider may prescribe stronger pain medicines.  What are the complications of shingles?  Shingles often goes away with no lasting effects. But some people have serious problems long after the blisters have healed:    Postherpetic neuralgia. This is the most common complication. It is severe nerve pain at the place where the rash used to be. It can last for months, or even years after you have had shingles. Medicines can be prescribed to help relieve the pain and improve quality of life.    Bacterial infection. Shingles blisters may become infected with bacteria. Antibiotic medicine is used to treat the infection.    Eye problems. A person with shingles on the face should see his or her healthcare provider right away. Shingles can cause serious problems with vision, and even blindness.  Very rarely shingles can  also lead to pneumonia, hearing problems, brain inflammation, or even death.   When to seek medical care  Contact your healthcare provider if you experience any of the following:    Symptoms that don t go away with treatment    A rash or blisters near your eye    Increased drainage, fever, or rash after treatment, or severe pain that doesn t go away   How can shingles be prevented?  You can only get shingles if you have had chicken pox in the past. Those who have never had chickenpox can get the virus from you. Although instead of developing shingles, the person may get chickenpox. Until your blisters form scabs, avoid contact with others, especially the following:    Pregnant women who have never had chickenpox or the vaccine    Infants who were born early (prematurely) or who had low weight at birth    People with weak immune system (for example, people receiving chemotherapy for cancer, people who have had organ transplants, or people with HIV infections)     The shingles vaccine  If you re 60 years of age or older, ask your healthcare provider if you should receive the shingles vaccine. The vaccine makes it less likely that you will develop shingles. If you do develop shingles, your symptoms will likely be milder than if you hadn t been vaccinated. Note: Although the vaccine is licensed for people 50 years of age or older, the CDC does not recommend the vaccine for those who are 50 to 59 years old.   Date Last Reviewed: 10/1/2016    6379-1101 [employer]. [address] [place], [place] [address]. All rights reserved. This information is not intended as a substitute for professional medical care. Always follow your healthcare professional's instructions.    It was a pleasure seeing you today at the [facility]. Thank you for allowing us to care for you today. We truly hope we provided you with the excellent service you deserve. Please let us know if there is anything  else we can do for you so we can be sure you are leaving completley satisfied with your care experience.       General information about your clinic   Clinic Hours Lab Hours (Appointments are required)   Mon-Thurs: 7:30 AM - 7 PM Mon-Thurs: 7:30 AM - 7 PM   Fri: 7:30 AM - 5 PM Fri: 7:30 AM - 5 PM        After Hours Nurse Advise & Appts:  [facility]: [phone]  [facility] On Call: to make appointments anytime: [phone] On Call Physician: call [phone] and answering service will page the on call physician.        For urgent appointments, please call [phone] and ask for the triage nurse or your care team clinic nurse.  How to contact my care team:  MyChart: [email or web address]   Phone: [phone]   Fax: [phone]       [facility]:   Phone: [phone]  Hours: 8:00 AM - 6:00 PM  Medication Refills:  Call your pharmacy and they will forward the refill to us. Please allow 3 business days for your refills to be completed.       Normal or non-critical lab and imaging results will be communicated to you by MyChart, letter or phone within 7 days.  If you do not hear from us within 10 days, please call the clinic. If you have a critical or abnormal lab result, we will notify you by phone as soon as possible.       We now have PWIC (Pediatric Walk in Care)  Monday-Friday from 7:30-4. Simply walk in and be seen for your urgent needs like cough, fever, rash, diarrhea or vomiting, pink eye, UTI. No appointments needed. Ask one of the team for more information      -Your Care Team:    Dr. Sonia Rodriguez - Internal Medicine/Pediatrics   Dr. Lilly Cartagena - Family Medicine  Dr. Jazmin Fortune - Pediatrics  Dr. Mercy Comer - Pediatrics  Adrianne Navarro CNP - Family Practice Nurse Practitioner                           Follow-ups after your visit        Your next 10 appointments already scheduled     Dec 11, 2017  7:30 AM CST   PHYSICAL with Sonia Rodriguez MD PhD   [facility]  [facility]    [address] [address] [place] [address] [phone]              Who to contact     If you have questions or need follow up information about today's clinic visit or your schedule please contact [facility] directly at [phone].  Normal or non-critical lab and imaging results will be communicated to you by MyChart, letter or phone within 4 business days after the clinic has received the results. If you do not hear from us within 7 days, please contact the clinic through [employer] or phone. If you have a critical or abnormal lab result, we will notify you by phone as soon as possible.  Submit refill requests through [employer] or call your pharmacy and they will forward the refill request to us. Please allow 3 business days for your refill to be completed.          Additional Information About Your Visit        [employer] Information     [employer] gives you secure access to your electronic health record. If you see a primary care provider, you can also send messages to your care team and make appointments. If you have questions, please call your primary care clinic.  If you do not have a primary care provider, please call [phone] and they will assist you.      [employer] is an electronic gateway that provides easy, online access to your medical records. With [employer], you can request a clinic appointment, read your test results, renew a prescription or communicate with your care team.     To access your existing account, please contact your [facility] or call [phone] for assistance.        Care EveryWhere ID     This is your Care EveryWhere ID. This could be used by other organizations to access your [facility] medical records  [id number]        Your Vitals Were     Pulse Temperature Last Period Pulse Oximetry BMI (Body Mass Index)       57 96.9  F (36.1  C) (Oral) 11/28/2017 (Exact Date) 100% 24.52 kg/m2        Blood Pressure  from Last 3 Encounters:   12/05/17 (!) 141/96   11/28/17 118/74   10/27/17 138/84    Weight from Last 3 Encounters:   12/05/17 140 lb 9.6 oz (63.8 kg)   11/28/17 144 lb (65.3 kg)   10/27/17 142 lb (64.4 kg)              Today, you had the following     No orders found for display         Today's Medication Changes          These changes are accurate as of: 12/5/17  5:05 PM.  If you have any questions, ask your nurse or doctor.               Start taking these medicines.        Dose/Directions    valACYclovir 1000 mg tablet   Commonly known as:  VALTREX   Used for:  Herpes zoster without complication   Started by:  Adrianne Navarro APRN CNP        Dose:  1000 mg   Take 1 tablet (1,000 mg) by mouth 3 times daily   Quantity:  21 tablet   Refills:  0            Where to get your medicines      These medications were sent to [facility] [address] [place] [address] [address] [facility]  [address] [facility], [facility] [address]     Phone:  [phone]     valACYclovir 1000 mg tablet                Primary Care Provider Office Phone # Fax #    Sonia Rodriguez MD PhD [phone]       [address]  [facility] [address]        Equal Access to Services     MALLIKA JEROME AH: Hadii jess ku hadasho Soomaali, waaxda luqadaha, qaybta kaalmada adeegyada, shannan gonzalez hayzoila pinzon . So [facility] [phone].    ATENCIÓN: Si habla español, tiene a peralta disposición servicios gratuitos de asistencia lingüística. Llame al [phone].    We comply with applicable federal civil rights laws and [place] laws. We do not discriminate on the basis of race, color, national origin, age, disability, sex, sexual orientation, or gender identity.            Thank you!     Thank you for choosing [facility]  for your care. Our goal is always to provide you with excellent care. Hearing back from our patients is one way we can continue to improve our services. Please take a few minutes to complete the written  survey that you may receive in the mail after your visit with us. Thank you!             Your Updated Medication List - Protect others around you: Learn how to safely use, store and throw away your medicines at www.disposemymeds.org.          This list is accurate as of: 12/5/17  5:05 PM.  Always use your most recent med list.                   Brand Name Dispense Instructions for use Diagnosis    aspirin 81 MG chewable tablet     90 tablet    Take 1 tablet (81 mg) by mouth daily    S/P repair of coarctation of aorta, Coarctation of aorta       metoprolol 50 MG 24 hr tablet    TOPROL-XL    90 tablet    TAKE ONE TABLET BY MOUTH ONE TIME DAILY    Hypertension, goal below 140/90       simvastatin 20 MG tablet    ZOCOR    90 tablet    Take 1 tablet (20 mg) by mouth At Bedtime    S/P repair of coarctation of aorta, Coarctation of aorta       valACYclovir 1000 mg tablet    VALTREX    21 tablet    Take 1 tablet (1,000 mg) by mouth 3 times daily    Herpes zoster without complication

## 2018-01-02 ENCOUNTER — OFFICE VISIT (OUTPATIENT)
Dept: PEDIATRICS | Facility: CLINIC | Age: 43
End: 2018-01-02
Payer: COMMERCIAL

## 2018-01-02 VITALS
WEIGHT: 141.4 LBS | OXYGEN SATURATION: 99 % | DIASTOLIC BLOOD PRESSURE: 70 MMHG | HEIGHT: 64 IN | HEART RATE: 71 BPM | TEMPERATURE: 96.8 F | BODY MASS INDEX: 24.14 KG/M2 | SYSTOLIC BLOOD PRESSURE: 111 MMHG

## 2018-01-02 DIAGNOSIS — H60.502 ACUTE OTITIS EXTERNA OF LEFT EAR, UNSPECIFIED TYPE: Primary | ICD-10-CM

## 2018-01-02 PROCEDURE — 99213 OFFICE O/P EST LOW 20 MIN: CPT | Performed by: NURSE PRACTITIONER

## 2018-01-02 RX ORDER — CEPHALEXIN 500 MG/1
500 CAPSULE ORAL 2 TIMES DAILY
Qty: 14 CAPSULE | Refills: 0 | Status: SHIPPED | OUTPATIENT
Start: 2018-01-02 | End: 2018-01-09

## 2018-01-02 NOTE — PATIENT INSTRUCTIONS
PLAN:   1.   Symptomatic therapy suggested: OTC Robitussin DM and call prn if symptoms persist or worsen.  Albuterol every 4 hours for the next 48 hours, then as needed.    2.  Orders Placed This Encounter   Medications     cephALEXin (KEFLEX) 500 MG capsule     Sig: Take 1 capsule (500 mg) by mouth 2 times daily for 7 days     Dispense:  14 capsule     Refill:  0     3. Patient needs to follow up in if no improvement,or sooner if worsening of symptoms or other symptoms develop.    It was a pleasure seeing you today at the [facility]. Thank you for allowing us to care for you today. We truly hope we provided you with the excellent service you deserve. Please let us know if there is anything else we can do for you so we can be sure you are leaving completley satisfied with your care experience.       General information about your clinic   Clinic Hours Lab Hours (Appointments are required)   Mon-Thurs: 7:30 AM - 7 PM Mon-Thurs: 7:30 AM - 7 PM   Fri: 7:30 AM - 5 PM Fri: 7:30 AM - 5 PM        After Hours Nurse Advise & Appts:  [facility]: [phone]  [facility] On Call: to make appointments anytime: [phone] On Call Physician: call [phone] and answering service will page the on call physician.        For urgent appointments, please call [phone] and ask for the triage nurse or your care team clinic nurse.  How to contact my care team:  [facility]: [email or web address]   Phone: [phone]   Fax: [phone]       [facility]:   Phone: [phone]  Hours: 8:00 AM - 6:00 PM  Medication Refills:  Call your pharmacy and they will forward the refill to us. Please allow 3 business days for your refills to be completed.       Normal or non-critical lab and imaging results will be communicated to you by MyChart, letter or phone within 7 days.  If you do not hear from us within 10 days, please call the clinic. If you have a critical or abnormal lab result, we  will notify you by phone as soon as possible.       We now have PWIC (Pediatric Walk in Care)  Monday-Friday from 7:30-4. Simply walk in and be seen for your urgent needs like cough, fever, rash, diarrhea or vomiting, pink eye, UTI. No appointments needed. Ask one of the team for more information      -Your Care Team:    Dr. Sonia Rodriguez - Internal Medicine/Pediatrics   Dr. Lilly Cartagena - Family Medicine  Dr. Jazmin Fortune - Pediatrics  Dr. Mercy Comer - Pediatrics  Adrianne Navarro CNP - Family Practice Nurse Practitioner

## 2018-01-02 NOTE — PROGRESS NOTES
SUBJECTIVE:   Sindhu Bennett is a 42 year old female who presents to clinic today for the following health issues:    Acute Illness   Acute illness concerns: ear pain  Onset: 4 days    Fever: no    Chills/Sweats: no    Headache (location?): no    Sinus Pressure:no    Conjunctivitis:  no    Ear Pain: YES: left    Rhinorrhea: YES    Congestion: YES    Sore Throat: no     Cough: YES-non-productive    Wheeze: no    Decreased Appetite: YES    Nausea: no    Vomiting: no    Diarrhea:  YES    Dysuria/Freq.: no    Fatigue/Achiness: YES    Sick/Strep Exposure: YES- children      Therapies Tried and outcome: asa, Vicks, humidifier     Has been having a URI for the last 2 weeks however in the last 4 days it has started with pain in the left ear      Problem list and histories reviewed & adjusted, as indicated.  Additional history: as documented    Patient Active Problem List   Diagnosis     Coarctation of aorta     Essential hypertension     CARDIOVASCULAR SCREENING; LDL GOAL LESS THAN 130     HSV infection     S/P repair of coarctation of aorta     Hypertension, goal below 140/90     Past Surgical History:   Procedure Laterality Date     ANGIOPLASTY       APPENDECTOMY  2006     BIOPSY  '96 & '98    when cyst were removed     C LAP OVARIAN CYSTOTOMY      4 different surgeries       Social History   Substance Use Topics     Smoking status: Never Smoker     Smokeless tobacco: Never Used     Alcohol use Yes     Family History   Problem Relation Age of Onset     Hypertension Mother      DIABETES Father      Hypertension Father      Cancer - colorectal Maternal Grandfather      C.A.D. Maternal Grandfather      DIABETES Maternal Grandfather      Hypertension Maternal Grandfather      C.A.D. Maternal Uncle      CEREBROVASCULAR DISEASE Maternal Uncle      Hypertension Maternal Uncle      DIABETES Maternal Uncle      DIABETES Maternal Grandmother      Multiple Sclerosis Maternal Grandmother      Hypertension Brother       "Colon Cancer Paternal Grandfather      Coronary Artery Disease Paternal Uncle      Asthma No family hx of      Breast Cancer No family hx of      Prostate Cancer No family hx of      Thyroid Disease No family hx of      Genetic Disorder No family hx of          Current Outpatient Prescriptions   Medication Sig Dispense Refill     simvastatin (ZOCOR) 20 MG tablet Take 1 tablet (20 mg) by mouth At Bedtime 90 tablet 1     metoprolol (TOPROL-XL) 50 MG 24 hr tablet TAKE ONE TABLET BY MOUTH ONE TIME DAILY  90 tablet 3     aspirin 81 MG chewable tablet Take 1 tablet (81 mg) by mouth daily 90 tablet 3     Allergies   Allergen Reactions     Penicillins Rash     Was a very red facial rash     Erythromycin      Severe vomiting     Levaquin [Levofloxacin]      Nitroglycerin      Doxycycline Rash     Toradol [Ketorolac Tromethamine]      Labs reviewed in EPIC      Reviewed and updated as needed this visit by clinical staffTobacco  Allergies  Meds  Med Hx  Surg Hx  Fam Hx  Soc Hx      Reviewed and updated as needed this visit by Provider         ROS:  CONSTITUTIONAL:NEGATIVE for fever, chills, change in weight  ENT/MOUTH: POSITIVE for ear pain left and postnasal drainage and NEGATIVE for epistaxis, fever, sinus pressure, tinnitus bilateral and tooth pain  RESP:POSITIVE for cough-productive and NEGATIVE for hemoptysis, Hx asthma, SOB/dyspnea and wheezing  CV: NEGATIVE for chest pain/chest pressure  GI: NEGATIVE for nausea, poor appetite and vomiting  MUSCULOSKELETAL: NEGATIVE for significant arthralgias or myalgia  HEME/ALLERGY/IMMUNE: POSITIVE  for allergies and NEGATIVE for anemia and bleeding disorder    OBJECTIVE:     /70 (BP Location: Right arm, Patient Position: Sitting, Cuff Size: Adult Regular)  Pulse 71  Temp 96.8  F (36  C) (Temporal)  Ht 5' 3.5\" (1.613 m)  Wt 141 lb 6.4 oz (64.1 kg)  LMP 12/27/2017  SpO2 99%  Breastfeeding? No  BMI 24.65 kg/m2  Body mass index is 24.65 kg/(m^2).  GENERAL: Patient is " well nourished, well developed,in no apparent distress, non-toxic, in no respiratory distress and acyanotic, alert, cooperative and well hydrated  EYES:  Right conjunctiva is not injected and without discharge.  Left conjunctiva is not injected and without discharge.  EARS: negative findings: no mastoid process tenderness, positive findings: erythema of external ear on left, Right TM: serous middle ear fluid, Left TM: serous middle ear fluid  NOSE: Nares normal. Septum midline. Mucosa normal. No drainage or sinus tenderness.,  Sinus not tender.  THROAT: normal.  NECK: supple with no adenopathy,   CARDIAC:NORMAL - regular rate and rhythm without murmur.  RESP: normal respiratory rate and rhythm, lungs clear to auscultation  ABD: Abdomen soft, non-tender.  SKIN: Skin color, texture, turgor normal. No rashes or lesions.  MS: extremities normal- no gross deformities noted, gait normal and normal muscle tone        Diagnostic Test Results:  none     ASSESSMENT/PLAN:     Sindhu was seen today for ear problem.    Diagnoses and all orders for this visit:    Acute otitis externa of left ear, unspecified type  -     cephALEXin (KEFLEX) 500 MG capsule; Take 1 capsule (500 mg) by mouth 2 times daily for 7 days    PLAN:   1.   Symptomatic therapy suggested: OTC Robitussin DM and call prn if symptoms persist or worsen.  Albuterol every 4 hours for the next 48 hours, then as needed.    2.  Orders Placed This Encounter   Medications     cephALEXin (KEFLEX) 500 MG capsule     Sig: Take 1 capsule (500 mg) by mouth 2 times daily for 7 days     Dispense:  14 capsule     Refill:  0     3. Patient needs to follow up in if no improvement,or sooner if worsening of symptoms or other symptoms develop.    See Patient Instructions    TREVOR Reynoso CNP  [facility]

## 2018-01-02 NOTE — NURSING NOTE
"Chief Complaint   Patient presents with     Ear Problem     left ear pain x 4 days       Initial /70 (BP Location: Right arm, Patient Position: Sitting, Cuff Size: Adult Regular)  Pulse 71  Temp 96.8  F (36  C) (Temporal)  Ht 5' 3.5\" (1.613 m)  Wt 141 lb 6.4 oz (64.1 kg)  LMP 12/27/2017  SpO2 99%  Breastfeeding? No  BMI 24.65 kg/m2 Estimated body mass index is 24.65 kg/(m^2) as calculated from the following:    Height as of this encounter: 5' 3.5\" (1.613 m).    Weight as of this encounter: 141 lb 6.4 oz (64.1 kg).  Medication Reconciliation: complete      JANNETTE Alejandro      "

## 2018-01-02 NOTE — MR AVS SNAPSHOT
After Visit Summary   1/2/2018    Sindhu Bennett    MRN: [id number]           Patient Information     Date Of Birth          1975        Visit Information        Provider Department      1/2/2018 3:10 PM Adrianne Navarro APRN CNP [facility]        Today's Diagnoses     Acute otitis externa of left ear, unspecified type    -  1      Care Instructions    PLAN:   1.   Symptomatic therapy suggested: OTC Robitussin DM and call prn if symptoms persist or worsen.  Albuterol every 4 hours for the next 48 hours, then as needed.    2.  Orders Placed This Encounter   Medications     cephALEXin (KEFLEX) 500 MG capsule     Sig: Take 1 capsule (500 mg) by mouth 2 times daily for 7 days     Dispense:  14 capsule     Refill:  0     3. Patient needs to follow up in if no improvement,or sooner if worsening of symptoms or other symptoms develop.    It was a pleasure seeing you today at the [facility] - Primary Care. Thank you for allowing us to care for you today. We truly hope we provided you with the excellent service you deserve. Please let us know if there is anything else we can do for you so we can be sure you are leaving completley satisfied with your care experience.       General information about your clinic   Clinic Hours Lab Hours (Appointments are required)   Mon-Thurs: 7:30 AM - 7 PM Mon-Thurs: 7:30 AM - 7 PM   Fri: 7:30 AM - 5 PM Fri: 7:30 AM - 5 PM        After Hours Nurse Advise & Appts:  [facility] Nurse Advisors: [phone]  [facility] On Call: to make appointments anytime: [phone] On Call Physician: call [phone] and answering service will page the on call physician.        For urgent appointments, please call [phone] and ask for the triage nurse or your care team clinic nurse.  How to contact my care team:  MyChart: [email or web address]   Phone: [phone]   Fax: [phone]       [facility]:   Phone:  [phone]  Hours: 8:00 AM - 6:00 PM  Medication Refills:  Call your pharmacy and they will forward the refill to us. Please allow 3 business days for your refills to be completed.       Normal or non-critical lab and imaging results will be communicated to you by MyChart, letter or phone within 7 days.  If you do not hear from us within 10 days, please call the clinic. If you have a critical or abnormal lab result, we will notify you by phone as soon as possible.       We now have PWIC (Pediatric Walk in Care)  Monday-Friday from 7:30-4. Simply walk in and be seen for your urgent needs like cough, fever, rash, diarrhea or vomiting, pink eye, UTI. No appointments needed. Ask one of the team for more information      -Your Care Team:    Dr. Sonia Rodriguez - Internal Medicine/Pediatrics   Dr. Lilly Cartagena - Family Medicine  Dr. Jazmin Fortune - Pediatrics  Dr. Mercy Comer - Pediatrics  Adrianne Navarro CNP - Family Practice Nurse Practitioner                           Follow-ups after your visit        Your next 10 appointments already scheduled     Jan 18, 2018  5:30 PM CST   PHYSICAL with Sonia Rodriguez MD PhD   [facility]    [address] [place] [address] [phone]              Who to contact     If you have questions or need follow up information about today's clinic visit or your schedule please contact [facility] directly at [phone].  Normal or non-critical lab and imaging results will be communicated to you by MyChart, letter or phone within 4 business days after the clinic has received the results. If you do not hear from us within 7 days, please contact the clinic through MyChart or phone. If you have a critical or abnormal lab result, we will notify you by phone as soon as possible.  Submit refill requests through [employer] or call your pharmacy and they will forward the refill request to us. Please allow 3 business days for  "your refill to be completed.          Additional Information About Your Visit        [employer] Information     [employer] gives you secure access to your electronic health record. If you see a primary care provider, you can also send messages to your care team and make appointments. If you have questions, please call your primary care clinic.  If you do not have a primary care provider, please call [phone] and they will assist you.      [employer] is an electronic gateway that provides easy, online access to your medical records. With [employer], you can request a clinic appointment, read your test results, renew a prescription or communicate with your care team.     To access your existing account, please contact your [facility] or call [phone] for assistance.        Care EveryWhere ID     This is your Care EveryWhere ID. This could be used by other organizations to access your [place] medical records  [id number]        Your Vitals Were     Pulse Temperature Height Last Period Pulse Oximetry Breastfeeding?    71 96.8  F (36  C) (Temporal) 5' 3.5\" (1.613 m) 12/27/2017 99% No    BMI (Body Mass Index)                   24.65 kg/m2            Blood Pressure from Last 3 Encounters:   01/02/18 111/70   12/05/17 (!) 141/96   11/28/17 118/74    Weight from Last 3 Encounters:   01/02/18 141 lb 6.4 oz (64.1 kg)   12/05/17 140 lb 9.6 oz (63.8 kg)   11/28/17 144 lb (65.3 kg)              Today, you had the following     No orders found for display         Today's Medication Changes          These changes are accurate as of: 1/2/18  3:34 PM.  If you have any questions, ask your nurse or doctor.               Start taking these medicines.        Dose/Directions    cephALEXin 500 MG capsule   Commonly known as:  KEFLEX   Used for:  Acute otitis externa of left ear, unspecified type   Started by:  Adrianne Navarro APRN CNP        Dose:  500 mg   Take 1 capsule (500 mg) by mouth 2 times daily " for 7 days   Quantity:  14 capsule   Refills:  0            Where to get your medicines      These medications were sent to [facility] - [place], [place] - [address]  [address], [facility] [address]     Phone:  [phone]     cephALEXin 500 MG capsule                Primary Care Provider Office Phone # Fax #    Sonia Rodriguez MD PhD [phone]       [address]  [facility] [address]        Equal Access to Services     SHREYA JEROME : Hadii aad ku hadasho Soomaali, waaxda luqadaha, qaybta kaalmada adeegyada, waxay idiin hayaan adeeg kharash la'aan . So [facility] [phone].    ATENCIÓN: Si habla español, tiene a peralta disposición servicios gratuitos de asistencia lingüística. [facility] [phone].    We comply with applicable federal civil rights laws and [place] laws. We do not discriminate on the basis of race, color, national origin, age, disability, sex, sexual orientation, or gender identity.            Thank you!     Thank you for choosing [facility]  for your care. Our goal is always to provide you with excellent care. Hearing back from our patients is one way we can continue to improve our services. Please take a few minutes to complete the written survey that you may receive in the mail after your visit with us. Thank you!             Your Updated Medication List - Protect others around you: Learn how to safely use, store and throw away your medicines at www.disposemymeds.org.          This list is accurate as of: 1/2/18  3:34 PM.  Always use your most recent med list.                   Brand Name Dispense Instructions for use Diagnosis    aspirin 81 MG chewable tablet     90 tablet    Take 1 tablet (81 mg) by mouth daily    S/P repair of coarctation of aorta, Coarctation of aorta       cephALEXin 500 MG capsule    KEFLEX    14 capsule    Take 1 capsule (500 mg) by mouth 2 times daily for 7 days    Acute otitis externa of left ear, unspecified type        metoprolol 50 MG 24 hr tablet    TOPROL-XL    90 tablet    TAKE ONE TABLET BY MOUTH ONE TIME DAILY    Hypertension, goal below 140/90       simvastatin 20 MG tablet    ZOCOR    90 tablet    Take 1 tablet (20 mg) by mouth At Bedtime    S/P repair of coarctation of aorta, Coarctation of aorta

## 2018-01-11 ENCOUNTER — CARE COORDINATION (OUTPATIENT)
Dept: CARDIOLOGY | Facility: CLINIC | Age: 43
End: 2018-01-11

## 2018-01-11 DIAGNOSIS — Q25.1 COARCTATION OF AORTA: Primary | ICD-10-CM

## 2018-01-12 ENCOUNTER — TELEPHONE (OUTPATIENT)
Dept: PEDIATRICS | Facility: CLINIC | Age: 43
End: 2018-01-12

## 2018-01-12 DIAGNOSIS — H60.502 ACUTE OTITIS EXTERNA OF LEFT EAR, UNSPECIFIED TYPE: ICD-10-CM

## 2018-01-12 RX ORDER — AZITHROMYCIN 250 MG/1
TABLET, FILM COATED ORAL
Qty: 6 TABLET | Refills: 0 | Status: SHIPPED | OUTPATIENT
Start: 2018-01-12 | End: 2018-03-08

## 2018-01-12 NOTE — TELEPHONE ENCOUNTER
[facility]    Phone Message    Name of Caller: Sindhu    Phone Number: Home number on file [phone] (home)    Best time to return call: any    May a detailed message be left on voicemail: yes    Relation to patient: Self    Reason for Call: Other: patient came in 1/2 with ear pain.  she finished the prescribed dosage on 1/9 and has no relief.  consistent ear pain, nasal drainage, pain, not feeling better.  please advise.  fyi patient uses [employer].  thank you     Action Taken: Message routed to:  Primary Care p [id number]

## 2018-01-12 NOTE — TELEPHONE ENCOUNTER
Left detailed message on patients voicemail Melissa sent in RX for zpak to [facility] pharmacy for her.  If further questions to call clinic and ask to speak with nurse.    Nancy Douglas RN,   [facility], [facility]

## 2018-01-12 NOTE — TELEPHONE ENCOUNTER
Called patient.  She states on day 6 out of 7 of the antibiotic cephalexin, she finally started feeling better.   The antibiotic was completed on 1/9 and the next day she noted it was coming back, ear pain and fluid in her left ear.  Her left ear felt like she was under water.    She denies fever or worsening symptoms, but she state it has returned.    She also has nasal congestion.  She started taking a daily allergy medication, she took a pain reliever yesterday.  She is wondering what she should do next.      Routing to provider to please advise.    Apple Mccarty RN, [facility]

## 2018-01-15 ENCOUNTER — OFFICE VISIT (OUTPATIENT)
Dept: PEDIATRICS | Facility: CLINIC | Age: 43
End: 2018-01-15
Payer: COMMERCIAL

## 2018-01-15 VITALS
WEIGHT: 143.8 LBS | TEMPERATURE: 96.6 F | HEART RATE: 75 BPM | BODY MASS INDEX: 25.07 KG/M2 | SYSTOLIC BLOOD PRESSURE: 120 MMHG | DIASTOLIC BLOOD PRESSURE: 80 MMHG | OXYGEN SATURATION: 100 %

## 2018-01-15 DIAGNOSIS — H60.392 INFECTIVE OTITIS EXTERNA, LEFT: Primary | ICD-10-CM

## 2018-01-15 DIAGNOSIS — H65.92 LEFT SEROUS OTITIS MEDIA, UNSPECIFIED CHRONICITY: ICD-10-CM

## 2018-01-15 PROCEDURE — 99213 OFFICE O/P EST LOW 20 MIN: CPT | Performed by: NURSE PRACTITIONER

## 2018-01-15 RX ORDER — CEPHALEXIN 500 MG/1
500 CAPSULE ORAL 2 TIMES DAILY
Qty: 20 CAPSULE | Refills: 0 | Status: SHIPPED | OUTPATIENT
Start: 2018-01-15 | End: 2018-03-08

## 2018-01-15 RX ORDER — FLUTICASONE PROPIONATE 50 MCG
1-2 SPRAY, SUSPENSION (ML) NASAL DAILY
Qty: 1 BOTTLE | Refills: 11 | Status: SHIPPED | OUTPATIENT
Start: 2018-01-15 | End: 2018-10-24

## 2018-01-15 RX ORDER — NEOMYCIN SULFATE, POLYMYXIN B SULFATE AND HYDROCORTISONE 10; 3.5; 1 MG/ML; MG/ML; [USP'U]/ML
3 SUSPENSION/ DROPS AURICULAR (OTIC) 3 TIMES DAILY
Qty: 1 BOTTLE | Refills: 0 | Status: SHIPPED | OUTPATIENT
Start: 2018-01-15 | End: 2018-01-22

## 2018-01-15 NOTE — PATIENT INSTRUCTIONS
PLAN:   1.   Symptomatic therapy suggested: OTC acetaminophen, ibuprofen, aleve and call prn if symptoms persist or worsen.    2.  Orders Placed This Encounter   Medications     neomycin-polymyxin-hydrocortisone (CORTISPORIN) 3.5-98123-8 otic suspension     Sig: Place 3 drops into the right ear 3 times daily for 7 days     Dispense:  1 Bottle     Refill:  0     cephALEXin (KEFLEX) 500 MG capsule     Sig: Take 1 capsule (500 mg) by mouth 2 times daily     Dispense:  20 capsule     Refill:  0     fluticasone (FLONASE) 50 MCG/ACT spray     Sig: Spray 1-2 sprays into both nostrils daily     Dispense:  1 Bottle     Refill:  11       3. Patient needs to follow up in if no improvement,or sooner if worsening of symptoms or other symptoms develop.  If this persists despite present plan let me know and I will send referral to ENT     It was a pleasure seeing you today at the [facility] - Primary Care. Thank you for allowing us to care for you today. We truly hope we provided you with the excellent service you deserve. Please let us know if there is anything else we can do for you so we can be sure you are leaving completley satisfied with your care experience.       General information about your clinic   Clinic Hours Lab Hours (Appointments are required)   Mon-Thurs: 7:30 AM - 7 PM Mon-Thurs: 7:30 AM - 7 PM   Fri: 7:30 AM - 5 PM Fri: 7:30 AM - 5 PM        After Hours Nurse Advise & Appts:  [facility] Nurse Advisors: [phone]  [facility] On Call: to make appointments anytime: [phone] On Call Physician: call [phone] and answering service will page the on call physician.        For urgent appointments, please call [phone] and ask for the triage nurse or your care team clinic nurse.  How to contact my care team:  MyChart: [email or web address]   Phone: [phone]   Fax: [phone]       [facility]:   Phone: [phone]  Hours: 8:00 AM - 6:00 PM  Medication Refills:  Call your  pharmacy and they will forward the refill to us. Please allow 3 business days for your refills to be completed.       Normal or non-critical lab and imaging results will be communicated to you by MyChart, letter or phone within 7 days.  If you do not hear from us within 10 days, please call the clinic. If you have a critical or abnormal lab result, we will notify you by phone as soon as possible.       We now have PWIC (Pediatric Walk in Care)  Monday-Friday from 7:30-4. Simply walk in and be seen for your urgent needs like cough, fever, rash, diarrhea or vomiting, pink eye, UTI. No appointments needed. Ask one of the team for more information      -Your Care Team:    Dr. Sonia Rodriguez - Internal Medicine/Pediatrics   Dr. Lilly Cartagena - Family Medicine  Dr. Jazmin Fortune - Pediatrics  Adrianne Navarro CNP - Family Practice Nurse Practitioner  Dr. Mercy Comer - Pediatrics

## 2018-01-15 NOTE — MR AVS SNAPSHOT
After Visit Summary   1/15/2018    Sindhu Bennett    MRN: [id number]           Patient Information     Date Of Birth          1975        Visit Information        Provider Department      1/15/2018 9:50 AM Adrianne Navarro APRN CNP [facility]        Today's Diagnoses     Infective otitis externa, left    -  1    Left serous otitis media, unspecified chronicity          Care Instructions    PLAN:   1.   Symptomatic therapy suggested: OTC acetaminophen, ibuprofen, aleve and call prn if symptoms persist or worsen.    2.  Orders Placed This Encounter   Medications     neomycin-polymyxin-hydrocortisone (CORTISPORIN) 3.5-85170-3 otic suspension     Sig: Place 3 drops into the right ear 3 times daily for 7 days     Dispense:  1 Bottle     Refill:  0     cephALEXin (KEFLEX) 500 MG capsule     Sig: Take 1 capsule (500 mg) by mouth 2 times daily     Dispense:  20 capsule     Refill:  0     fluticasone (FLONASE) 50 MCG/ACT spray     Sig: Spray 1-2 sprays into both nostrils daily     Dispense:  1 Bottle     Refill:  11       3. Patient needs to follow up in if no improvement,or sooner if worsening of symptoms or other symptoms develop.  If this persists despite present plan let me know and I will send referral to ENT     It was a pleasure seeing you today at the [facility] - Primary Care. Thank you for allowing us to care for you today. We truly hope we provided you with the excellent service you deserve. Please let us know if there is anything else we can do for you so we can be sure you are leaving completley satisfied with your care experience.       General information about your clinic   Clinic Hours Lab Hours (Appointments are required)   Mon-Thurs: 7:30 AM - 7 PM Mon-Thurs: 7:30 AM - 7 PM   Fri: 7:30 AM - 5 PM Fri: 7:30 AM - 5 PM        After Hours Nurse Advise & Appts:  [facility] Nurse Advisors: [phone]  [facility] On Call: to make appointments  anytime: [phone] On Call Physician: call [phone] and answering service will page the on call physician.        For urgent appointments, please call [phone] and ask for the triage nurse or your care team clinic nurse.  How to contact my care team:  [facility]: [email or web address]   Phone: [phone]   Fax: [phone]       [facility]:   Phone: [phone]  Hours: 8:00 AM - 6:00 PM  Medication Refills:  Call your pharmacy and they will forward the refill to us. Please allow 3 business days for your refills to be completed.       Normal or non-critical lab and imaging results will be communicated to you by MyChart, letter or phone within 7 days.  If you do not hear from us within 10 days, please call the clinic. If you have a critical or abnormal lab result, we will notify you by phone as soon as possible.       We now have PWIC (Pediatric Walk in Care)  Monday-Friday from 7:30-4. Simply walk in and be seen for your urgent needs like cough, fever, rash, diarrhea or vomiting, pink eye, UTI. No appointments needed. Ask one of the team for more information      -Your Care Team:    Dr. Sonia Rodriguez - Internal Medicine/Pediatrics   Dr. Lilly Cartagena - Family Medicine  Dr. Jazmin Fortune - Pediatrics  Adrianne Navarro CNP - Family Practice Nurse Practitioner  Dr. Mercy Comer - Pediatrics                       Follow-ups after your visit        Your next 10 appointments already scheduled     Jan 18, 2018  5:30 PM CST   PHYSICAL with Sonia Rodriguez MD PhD   [facility] ([facility]    [address] [address] [place] [address] [phone]              Who to contact     If you have questions or need follow up information about today's clinic visit or your schedule please contact [facility] directly at [phone].  Normal or non-critical lab and imaging results will be communicated to you by MyChart, letter or phone within 4 business days after  the clinic has received the results. If you do not hear from us within 7 days, please contact the clinic through [employer] or phone. If you have a critical or abnormal lab result, we will notify you by phone as soon as possible.  Submit refill requests through [employer] or call your pharmacy and they will forward the refill request to us. Please allow 3 business days for your refill to be completed.          Additional Information About Your Visit        [employer] Information     [employer] gives you secure access to your electronic health record. If you see a primary care provider, you can also send messages to your care team and make appointments. If you have questions, please call your primary care clinic.  If you do not have a primary care provider, please call [phone] and they will assist you.      [employer] is an electronic gateway that provides easy, online access to your medical records. With [employer], you can request a clinic appointment, read your test results, renew a prescription or communicate with your care team.     To access your existing account, please contact your [facility] or call [phone] for assistance.        Care EveryWhere ID     This is your Care EveryWhere ID. This could be used by other organizations to access your [place] medical records  [id number]        Your Vitals Were     Pulse Temperature Last Period Pulse Oximetry Breastfeeding? BMI (Body Mass Index)    75 96.6  F (35.9  C) (Temporal) 12/27/2017 100% No 25.07 kg/m2       Blood Pressure from Last 3 Encounters:   01/15/18 120/80   01/02/18 111/70   12/05/17 (!) 141/96    Weight from Last 3 Encounters:   01/15/18 143 lb 12.8 oz (65.2 kg)   01/02/18 141 lb 6.4 oz (64.1 kg)   12/05/17 140 lb 9.6 oz (63.8 kg)              Today, you had the following     No orders found for display         Today's Medication Changes          These changes are accurate as of: 1/15/18 10:06 AM.  If you have any questions,  ask your nurse or doctor.               Start taking these medicines.        Dose/Directions    cephALEXin 500 MG capsule   Commonly known as:  KEFLEX   Used for:  Infective otitis externa, left   Started by:  Adrianne Navarro APRN CNP        Dose:  500 mg   Take 1 capsule (500 mg) by mouth 2 times daily   Quantity:  20 capsule   Refills:  0       fluticasone 50 MCG/ACT spray   Commonly known as:  FLONASE   Started by:  Adrianne Navarro APRN CNP        Dose:  1-2 spray   Spray 1-2 sprays into both nostrils daily   Quantity:  1 Bottle   Refills:  11       neomycin-polymyxin-hydrocortisone 3.5-21129-3 otic suspension   Commonly known as:  CORTISPORIN   Used for:  Infective otitis externa, left   Started by:  Adrianne Navarro APRN CNP        Dose:  3 drop   Place 3 drops into the right ear 3 times daily for 7 days   Quantity:  1 Bottle   Refills:  0            Where to get your medicines      These medications were sent to [facility] [place] [facility] [address], [address]  [address], [address], [facility] [address]     Phone:  [phone]     cephALEXin 500 MG capsule    fluticasone 50 MCG/ACT spray    neomycin-polymyxin-hydrocortisone 3.5-83705-2 otic suspension                Primary Care Provider Office Phone # Fax #    Sonia Rodriguez MD PhD [phone]       [address]  [facility] [address]        Equal Access to Services     [facility] AH: Hadii jess ku hadasho Soomaali, waaxda luqadaha, qaybta kaalmada adeegyada, shannan pinzon . So [facility] [phone].    ATENCIÓN: Si habla español, tiene a peralta disposición servicios gratuitos de asistencia lingüística. Llame al [phone].    We comply with applicable federal civil rights laws and [place] laws. We do not discriminate on the basis of race, color, national origin, age, disability, sex, sexual orientation, or gender identity.            Thank you!     Thank you for  [facility]  for your care. Our goal is always to provide you with excellent care. Hearing back from our patients is one way we can continue to improve our services. Please take a few minutes to complete the written survey that you may receive in the mail after your visit with us. Thank you!             Your Updated Medication List - Protect others around you: Learn how to safely use, store and throw away your medicines at www.disposemymeds.org.          This list is accurate as of: 1/15/18 10:06 AM.  Always use your most recent med list.                   Brand Name Dispense Instructions for use Diagnosis    aspirin 81 MG chewable tablet     90 tablet    Take 1 tablet (81 mg) by mouth daily    S/P repair of coarctation of aorta, Coarctation of aorta       azithromycin 250 MG tablet    ZITHROMAX    6 tablet    Take 2 tablets the first day and then take one a day for 4 days.    Acute otitis externa of left ear, unspecified type       cephALEXin 500 MG capsule    KEFLEX    20 capsule    Take 1 capsule (500 mg) by mouth 2 times daily    Infective otitis externa, left       fluticasone 50 MCG/ACT spray    FLONASE    1 Bottle    Spray 1-2 sprays into both nostrils daily        metoprolol succinate 50 MG 24 hr tablet    TOPROL-XL    90 tablet    TAKE ONE TABLET BY MOUTH ONE TIME DAILY    Hypertension, goal below 140/90       neomycin-polymyxin-hydrocortisone 3.5-46163-0 otic suspension    CORTISPORIN    1 Bottle    Place 3 drops into the right ear 3 times daily for 7 days    Infective otitis externa, left       simvastatin 20 MG tablet    ZOCOR    90 tablet    Take 1 tablet (20 mg) by mouth At Bedtime    S/P repair of coarctation of aorta, Coarctation of aorta

## 2018-01-15 NOTE — PROGRESS NOTES
SUBJECTIVE:   Sindhu Bennett is a 42 year old female who presents to clinic today for the following health issues:      Acute Illness   Acute illness concerns: left ear pain, nose bleeds,   Onset: 2 week    Fever: no    Chills/Sweats: no    Headache (location?): YES-due to ear pan    Sinus Pressure:no    Conjunctivitis:  no    Ear Pain: YES: left    Rhinorrhea: YES- bloody nose    Congestion: no    Sore Throat: no     Cough: no    Wheeze: no    Decreased Appetite: no    Nausea: no    Vomiting: no    Diarrhea:  no    Dysuria/Freq.: no    Fatigue/Achiness: no    Sick/Strep Exposure: no     Therapies Tried and outcome: Zithromax, ibuprofen, asa, tylenol     Has completed 2 courses of zithromax but only took a couple squirts of sons Flonase for a day or two       Problem list and histories reviewed & adjusted, as indicated.  Additional history: as documented    Patient Active Problem List   Diagnosis     Coarctation of aorta     Essential hypertension     CARDIOVASCULAR SCREENING; LDL GOAL LESS THAN 130     HSV infection     S/P repair of coarctation of aorta     Hypertension, goal below 140/90     Past Surgical History:   Procedure Laterality Date     ANGIOPLASTY       APPENDECTOMY  2006     BIOPSY  '96 & '98    when cyst were removed     C LAP OVARIAN CYSTOTOMY      4 different surgeries       Social History   Substance Use Topics     Smoking status: Never Smoker     Smokeless tobacco: Never Used     Alcohol use Yes     Family History   Problem Relation Age of Onset     Hypertension Mother      DIABETES Father      Hypertension Father      Cancer - colorectal Maternal Grandfather      C.A.D. Maternal Grandfather      DIABETES Maternal Grandfather      Hypertension Maternal Grandfather      C.A.D. Maternal Uncle      CEREBROVASCULAR DISEASE Maternal Uncle      Hypertension Maternal Uncle      DIABETES Maternal Uncle      DIABETES Maternal Grandmother      Multiple Sclerosis Maternal Grandmother       Hypertension Brother      Colon Cancer Paternal Grandfather      Coronary Artery Disease Paternal Uncle      Asthma No family hx of      Breast Cancer No family hx of      Prostate Cancer No family hx of      Thyroid Disease No family hx of      Genetic Disorder No family hx of          Current Outpatient Prescriptions   Medication Sig Dispense Refill     azithromycin (ZITHROMAX) 250 MG tablet Take 2 tablets the first day and then take one a day for 4 days. 6 tablet 0     simvastatin (ZOCOR) 20 MG tablet Take 1 tablet (20 mg) by mouth At Bedtime 90 tablet 1     metoprolol (TOPROL-XL) 50 MG 24 hr tablet TAKE ONE TABLET BY MOUTH ONE TIME DAILY  90 tablet 3     aspirin 81 MG chewable tablet Take 1 tablet (81 mg) by mouth daily 90 tablet 3     Allergies   Allergen Reactions     Penicillins Rash     Was a very red facial rash     Erythromycin      Severe vomiting     Levaquin [Levofloxacin]      Nitroglycerin      Doxycycline Rash     Toradol [Ketorolac Tromethamine]      Labs reviewed in EPIC      Reviewed and updated as needed this visit by clinical staffTobacco  Allergies  Meds  Med Hx  Surg Hx  Fam Hx  Soc Hx      Reviewed and updated as needed this visit by Provider         ROS:  CONSTITUTIONAL:NEGATIVE for fever, chills, change in weight  ENT/MOUTH: POSITIVE for ear pain left and NEGATIVE for epistaxis, fever and sinus pressure  RESP:NEGATIVE for significant cough or SOB  CV: NEGATIVE for chest pain/chest pressure  GI: NEGATIVE for nausea, poor appetite and vomiting  HEME/ALLERGY/IMMUNE: NEGATIVE for allergies    OBJECTIVE:     /80 (BP Location: Right arm, Patient Position: Sitting, Cuff Size: Adult Regular)  Pulse 75  Temp 96.6  F (35.9  C) (Temporal)  Wt 143 lb 12.8 oz (65.2 kg)  LMP 12/27/2017  SpO2 100%  Breastfeeding? No  BMI 25.07 kg/m2  Body mass index is 25.07 kg/(m^2).  GENERAL: Patient is well nourished, well developed,in no apparent distress, non-toxic, alert, cooperative and well  hydrated  alert, active, comfortable, in no acute distress  EYES:  Right conjunctiva is not injected and without discharge.  Left conjunctiva is not injected and without discharge.  EARS: negative findings: no mastoid process tenderness, positive findings: erythema and edema of ear canal on left, Right TM: serous middle ear fluid, Left TM: dull and serous middle ear fluid  NOSE: positive findings: mucosa swollen, pale, and boggy,  Sinus not tender.  THROAT: normal.  NECK: supple with no adenopathy,   CARDIAC:NORMAL - regular rate and rhythm without murmur.  RESP: normal respiratory rate and rhythm, lungs clear to auscultation  ABD: Abdomen soft, non-tender.  SKIN: Skin color, texture, turgor normal. No rashes or lesions.  MS: extremities normal- no gross deformities noted, gait normal and normal muscle tone    Diagnostic Test Results:  none     ASSESSMENT/PLAN:     Sindhu was seen today for ear problem.    Diagnoses and all orders for this visit:    Infective otitis externa, left  -     neomycin-polymyxin-hydrocortisone (CORTISPORIN) 3.5-66592-5 otic suspension; Place 3 drops into the right ear 3 times daily for 7 days  -     cephALEXin (KEFLEX) 500 MG capsule; Take 1 capsule (500 mg) by mouth 2 times daily    Left serous otitis media, unspecified chronicity  -     fluticasone (FLONASE) 50 MCG/ACT spray; Spray 1-2 sprays into both nostrils daily    PLAN:   Symptomatic therapy suggested: OTC acetaminophen, ibuprofen, aleve and call prn if symptoms persist or worsen.  Patient needs to follow up in if no improvement,or sooner if worsening of symptoms or other symptoms develop.  If this persists despite present plan let me know and I will send referral to ENT         See Patient Instructions    TREVOR Reynoso CNP  [facility]

## 2018-01-15 NOTE — NURSING NOTE
"Chief Complaint   Patient presents with     Ear Problem     still having pain in the left ear x 2 weeks, no getting better       Initial /80 (BP Location: Right arm, Patient Position: Sitting, Cuff Size: Adult Regular)  Pulse 75  Temp 96.6  F (35.9  C) (Temporal)  Wt 143 lb 12.8 oz (65.2 kg)  LMP 12/27/2017  SpO2 100%  Breastfeeding? No  BMI 25.07 kg/m2 Estimated body mass index is 25.07 kg/(m^2) as calculated from the following:    Height as of 1/2/18: 5' 3.5\" (1.613 m).    Weight as of this encounter: 143 lb 12.8 oz (65.2 kg).  Medication Reconciliation: complete      JANNETTE Alejandro      "

## 2018-02-06 ENCOUNTER — DOCUMENTATION ONLY (OUTPATIENT)
Dept: LAB | Facility: CLINIC | Age: 43
End: 2018-02-06

## 2018-02-06 DIAGNOSIS — I10 HYPERTENSION, GOAL BELOW 140/90: Primary | ICD-10-CM

## 2018-02-06 DIAGNOSIS — Z13.6 CARDIOVASCULAR SCREENING; LDL GOAL LESS THAN 130: ICD-10-CM

## 2018-02-06 DIAGNOSIS — Z00.00 ROUTINE GENERAL MEDICAL EXAMINATION AT A HEALTH CARE FACILITY: ICD-10-CM

## 2018-02-06 DIAGNOSIS — I10 ESSENTIAL HYPERTENSION: ICD-10-CM

## 2018-02-06 NOTE — PROGRESS NOTES
Routing pended lab to provider to please review when able. Apple Mccarty RN, [facility]

## 2018-02-08 ENCOUNTER — OFFICE VISIT (OUTPATIENT)
Dept: PEDIATRICS | Facility: CLINIC | Age: 43
End: 2018-02-08
Payer: COMMERCIAL

## 2018-02-08 VITALS
BODY MASS INDEX: 24.19 KG/M2 | HEIGHT: 64 IN | OXYGEN SATURATION: 100 % | SYSTOLIC BLOOD PRESSURE: 124 MMHG | DIASTOLIC BLOOD PRESSURE: 77 MMHG | WEIGHT: 141.7 LBS | TEMPERATURE: 98 F | HEART RATE: 64 BPM

## 2018-02-08 DIAGNOSIS — Z13.6 CARDIOVASCULAR SCREENING; LDL GOAL LESS THAN 130: ICD-10-CM

## 2018-02-08 DIAGNOSIS — Z87.74 S/P REPAIR OF COARCTATION OF AORTA: ICD-10-CM

## 2018-02-08 DIAGNOSIS — Z00.00 ROUTINE GENERAL MEDICAL EXAMINATION AT A HEALTH CARE FACILITY: Primary | ICD-10-CM

## 2018-02-08 DIAGNOSIS — Q25.1 COARCTATION OF AORTA: ICD-10-CM

## 2018-02-08 DIAGNOSIS — I10 ESSENTIAL HYPERTENSION: ICD-10-CM

## 2018-02-08 DIAGNOSIS — I10 HYPERTENSION, GOAL BELOW 140/90: ICD-10-CM

## 2018-02-08 DIAGNOSIS — Z00.00 ROUTINE GENERAL MEDICAL EXAMINATION AT A HEALTH CARE FACILITY: ICD-10-CM

## 2018-02-08 DIAGNOSIS — Z12.4 SCREENING FOR MALIGNANT NEOPLASM OF CERVIX: ICD-10-CM

## 2018-02-08 LAB
ANION GAP SERPL CALCULATED.3IONS-SCNC: 5 MMOL/L (ref 3–14)
BUN SERPL-MCNC: 11 MG/DL (ref 7–30)
CALCIUM SERPL-MCNC: 8.8 MG/DL (ref 8.5–10.1)
CHLORIDE SERPL-SCNC: 108 MMOL/L (ref 94–109)
CHOLEST SERPL-MCNC: 116 MG/DL
CO2 SERPL-SCNC: 28 MMOL/L (ref 20–32)
CREAT SERPL-MCNC: 0.77 MG/DL (ref 0.52–1.04)
CREAT UR-MCNC: 36 MG/DL
GFR SERPL CREATININE-BSD FRML MDRD: 83 ML/MIN/1.7M2
GLUCOSE SERPL-MCNC: 90 MG/DL (ref 70–99)
HDLC SERPL-MCNC: 51 MG/DL
LDLC SERPL CALC-MCNC: 48 MG/DL
MICROALBUMIN UR-MCNC: <5 MG/L
MICROALBUMIN/CREAT UR: NORMAL MG/G CR (ref 0–25)
NONHDLC SERPL-MCNC: 65 MG/DL
POTASSIUM SERPL-SCNC: 4.6 MMOL/L (ref 3.4–5.3)
SODIUM SERPL-SCNC: 141 MMOL/L (ref 133–144)
TRIGL SERPL-MCNC: 85 MG/DL

## 2018-02-08 PROCEDURE — 99396 PREV VISIT EST AGE 40-64: CPT | Performed by: INTERNAL MEDICINE

## 2018-02-08 PROCEDURE — 82043 UR ALBUMIN QUANTITATIVE: CPT | Performed by: INTERNAL MEDICINE

## 2018-02-08 PROCEDURE — 80048 BASIC METABOLIC PNL TOTAL CA: CPT | Performed by: INTERNAL MEDICINE

## 2018-02-08 PROCEDURE — 80061 LIPID PANEL: CPT | Performed by: INTERNAL MEDICINE

## 2018-02-08 PROCEDURE — 36415 COLL VENOUS BLD VENIPUNCTURE: CPT | Performed by: INTERNAL MEDICINE

## 2018-02-08 PROCEDURE — 87624 HPV HI-RISK TYP POOLED RSLT: CPT | Performed by: INTERNAL MEDICINE

## 2018-02-08 PROCEDURE — G0145 SCR C/V CYTO,THINLAYER,RESCR: HCPCS | Performed by: INTERNAL MEDICINE

## 2018-02-08 RX ORDER — ASPIRIN 81 MG/1
81 TABLET, CHEWABLE ORAL DAILY
Qty: 90 TABLET | Refills: 3 | Status: SHIPPED | OUTPATIENT
Start: 2018-02-08 | End: 2019-04-08

## 2018-02-08 RX ORDER — SIMVASTATIN 20 MG
20 TABLET ORAL AT BEDTIME
Qty: 90 TABLET | Refills: 3 | Status: SHIPPED | OUTPATIENT
Start: 2018-02-08 | End: 2019-03-10

## 2018-02-08 NOTE — PROGRESS NOTES
Dear Sindhu,   Here are your recent results which are within the expected range.  Please continue with your current plan of care.     Please call or Mychart to our office if you have further questions.     Sonia Rodriguez MD-PhD

## 2018-02-08 NOTE — PROGRESS NOTES
SUBJECTIVE:   CC: Sindhu Bennett is an 42 year old woman who presents for preventive health visit.     Healthy Habits:    Do you get at least three servings of calcium containing foods daily (dairy, green leafy vegetables, etc.)? yes    Amount of exercise or daily activities, outside of work: 3 day(s) per week    Problems taking medications regularly No    Medication side effects: No    Have you had an eye exam in the past two years? yes    Do you see a dentist twice per year? Will be doing that soon, hasnt been there for 8 months    Do you have sleep apnea, excessive snoring or daytime drowsiness?no      -------------------------------------    Today's PHQ-2 Score:   PHQ-2 ( 1999 [employer]) 1/2/2018 9/1/2017   Q1: Little interest or pleasure in doing things 0 0   Q2: Feeling down, depressed or hopeless 0 0   PHQ-2 Score 0 0       Abuse: Current or Past(Physical, Sexual or Emotional)- No  Do you feel safe in your environment - Yes    Social History   Substance Use Topics     Smoking status: Never Smoker     Smokeless tobacco: Never Used     Alcohol use Yes     If you drink alcohol do you typically have >3 drinks per day or >7 drinks per week? No                     Reviewed orders with patient.  Reviewed health maintenance and updated orders accordingly - Yes  Labs reviewed in EPIC    Patient under age 50, mutual decision reflected in health maintenance.      Pertinent mammograms are reviewed under the imaging tab.  History of abnormal Pap smear: NO - age 30-65 PAP every 5 years with negative HPV co-testing recommended    Reviewed and updated as needed this visit by clinical staff  Tobacco  Allergies  Meds         Reviewed and updated as needed this visit by Provider            ROS:  C: NEGATIVE for fever, chills, change in weight  I: NEGATIVE for worrisome rashes, moles or lesions  E: NEGATIVE for vision changes or irritation  ENT: NEGATIVE for ear, mouth and throat problems  R: NEGATIVE for    Physical Therapy Evaluation + Discharge Addendum     Visit Count: 1  Plan of Care/Certification Dates: Initial: 4/21/2017 Through: 6/16/2017  Insurance Information: has used 0 therapy visits prior this year  Next Referring Provider Visit: prn    Referred by: Dr. Farhad Matta MD  Medical Diagnosis (from order):  Acute midline low back pain with sciatica, sciatica laterality unspecified [M54.40]   Treatment Diagnosis: Hip Symptoms with Pain, Impaired Joint Mobility, Impaired Range of Motion, Impaired Motor Function/Muscle Performance and Impaired Balance  Insurance: 1. [facility] MEDICARE COMPLETE  2. N/A    Date of Onset: new onset; 2-4 months   Diagnosis Precautions: none  Relevant co-morbidities and medications: HTN, hypercholesterolemia, reports mild dementia per physician    Relevant Tests: Relevant diagnostic tests: plain film radiograph   FINDINGS:    Right hip demonstrates progression of superior lateral joint space narrowing which is now moderate to marked. There is progression of spurring involving the superior acetabulum. There is mild interval medial joint space narrowing as well. No fractures or dislocations are seen.   IMPRESSION:    There is progressive degenerative change about the right hip which appears moderate to marked.    Medical/surgical history, medications and relevant tests have been reviewed.    SUBJECTIVE   Pain right buttock/posterior lateral hip and into groin with getting up after sitting. Pain has been going on a for a few months, insidious onset. Heat helps.   Pain:  Intensity: Now: 2/10; Best: 0/10; Worst: 4-5/10 (in the last 2 weeks)  Location: Posterior lateral right hip, somewhat into hip crease/groin   Quality/Description: Ache, Intermittent, deep in the joint   Relieving/Alleviating factors: heat, has been recommended Aleve, tends to go away after moving around in the morning   Function:  Limitations and exacerbating factors (patient reported): pain with transition from a period of  "significant cough or SOB  B: NEGATIVE for masses, tenderness or discharge  CV: NEGATIVE for chest pain, palpitations or peripheral edema  GI: NEGATIVE for nausea, abdominal pain, heartburn, or change in bowel habits  : NEGATIVE for unusual urinary or vaginal symptoms. Periods are regular.  M: NEGATIVE for significant arthralgias or myalgia  N: NEGATIVE for weakness, dizziness or paresthesias  P: NEGATIVE for changes in mood or affect    OBJECTIVE:   /77 (BP Location: Right arm, Patient Position: Chair, Cuff Size: Adult Regular)  Pulse 64  Temp 98  F (36.7  C) (Temporal)  Ht 5' 3.5\" (1.613 m)  Wt 141 lb 11.2 oz (64.3 kg)  SpO2 100%  BMI 24.71 kg/m2  EXAM:  GENERAL: healthy, alert and no distress  EYES: Eyes grossly normal to inspection, PERRL and conjunctivae and sclerae normal  HENT: ear canals and TM's normal, nose and mouth without ulcers or lesions  NECK: no adenopathy, no asymmetry, masses, or scars and thyroid normal to palpation  RESP: lungs clear to auscultation - no rales, rhonchi or wheezes  BREAST: normal without masses, tenderness or nipple discharge and no palpable axillary masses or adenopathy  CV: regular rate and rhythm, normal S1 S2, no S3 or S4, no murmur, click or rub, no peripheral edema and peripheral pulses strong  ABDOMEN: soft, nontender, no hepatosplenomegaly, no masses and bowel sounds normal  MS: no gross musculoskeletal defects noted, no edema  SKIN: no suspicious lesions or rashes  NEURO: Normal strength and tone, mentation intact and speech normal  PSYCH: mentation appears normal, affect normal/bright    Results for orders placed or performed in visit on 02/08/18   Albumin Random Urine Quantitative with Creat Ratio   Result Value Ref Range    Creatinine Urine 36 mg/dL    Albumin Urine mg/L <5 mg/L    Albumin Urine mg/g Cr Unable to calculate due to low value 0 - 25 mg/g Cr      Orders Only on 02/08/2018   Component Date Value Ref Range Status     Sodium 02/08/2018 141  133 " sitting, stiffer in the morning   Prior level (patient reported): independent with all activities of daily living and instrumental activites of daily living, pain free    Prior Treatment: no therapies in the past year for current condition. Hospitalization, home health services or skilled nursing facility in the last 30 days: No, per patient.    Home Environment/Social Support: Patient lives with children, with grandchildren. Patient has assistance as needed from family/friends.      Safety:  Do you feel safe at home, work and/or school? yes, per patient  Falls: No    Patient Goals/Concerns: That I can sit and read a book or watch tv and get up and walk without anything hurting.      OBJECTIVE   Posture/Observation:  Thin kyphotic female presents with son, Graham, who drove her (did not stay for whole evaluation). Stands with equal weightbearing.     Gait Analysis:  Independent, slowed, antalgic after sitting for subjective    Range of Motion (%): Lumbar  [] All motions within functional/normal limits except those noted.   [x] Only those motions that were assessed are noted.   Date Initial    Flexion Fingertips to ankles  No pain provocation   Extension 50% No pain provocation   Lateral Flexion Left 75% No pain provocation   Lateral Flexion Right  50% No pain provocation   Rotation Left  NT    Rotation Right  NT    [standard testing positions unless otherwise noted]  Comments: NA; * denotes pain       Range of Motion (degrees): Hip Passive Range of Motion  [] All motions within functional/normal limits except those noted.   [x] Only those motions that were assessed are noted.   [] Uninvolved motion within functional/normal limits.   Norm Left Right Involved   Date  Initial Initial    Flexion 110-120  ~120 ~120* Mild pain into anterior thigh    Extension 10-15      Abduction 30-50       Adduction 30      Internal Rotation 30-40  Mildly limited  Mildly limited Mild pain groin and anteriolateral hip    External Rotation  - 144 mmol/L Final     Potassium 02/08/2018 4.6  3.4 - 5.3 mmol/L Final     Chloride 02/08/2018 108  94 - 109 mmol/L Final     Carbon Dioxide 02/08/2018 28  20 - 32 mmol/L Final     Anion Gap 02/08/2018 5  3 - 14 mmol/L Final     Glucose 02/08/2018 90  70 - 99 mg/dL Final    Fasting specimen     Urea Nitrogen 02/08/2018 11  7 - 30 mg/dL Final     Creatinine 02/08/2018 0.77  0.52 - 1.04 mg/dL Final     GFR Estimate 02/08/2018 83  >60 mL/min/1.7m2 Final    Non  GFR Calc     GFR Estimate If Black 02/08/2018 >90  >60 mL/min/1.7m2 Final    African American GFR Calc     Calcium 02/08/2018 8.8  8.5 - 10.1 mg/dL Final     Cholesterol 02/08/2018 116  <200 mg/dL Final     Triglycerides 02/08/2018 85  <150 mg/dL Final    Fasting specimen     HDL Cholesterol 02/08/2018 51  >49 mg/dL Final     LDL Cholesterol Calculated 02/08/2018 48  <100 mg/dL Final    Desirable:       <100 mg/dl     Non HDL Cholesterol 02/08/2018 65  <130 mg/dL Final          ASSESSMENT/PLAN:       ICD-10-CM    1. Routine general medical examination at a health care facility Z00.00 Pap imaged thin layer screen with HPV - recommended age 30 - 65 years (select HPV order below)     HPV High Risk Types DNA Cervical   2. Hypertension, goal below 140/90 I10 Albumin Random Urine Quantitative with Creat Ratio   3. Screening for malignant neoplasm of cervix Z12.4 Pap imaged thin layer screen with HPV - recommended age 30 - 65 years (select HPV order below)     HPV High Risk Types DNA Cervical   4. S/P repair of coarctation of aorta Z87.74 aspirin 81 MG chewable tablet     simvastatin (ZOCOR) 20 MG tablet   5. Coarctation of aorta Q25.1 aspirin 81 MG chewable tablet     simvastatin (ZOCOR) 20 MG tablet     -- reported one day of RLQ/adnexal pain resembling prior endometriosis. Not recurred since. Will monitor.   -- stable chronic health issues. Medications refilled.    -- pt opted mammogram screening at age 51.     COUNSELING:   Reviewed preventive  40-60  Mildly limited Very guarded    [standard testing positions unless otherwise noted]   Comments: NA; * denotes pain     Strength: (out of 5)  [] Gross muscle strength within functional/normal limits except as noted.  [] Only muscle strength that was assessed are noted.  [] Uninvolved muscle strength within functional/normal limits.   Left Right Involved   Date Initial Initial    HIP        Flexion 3+/5 4/5      Extension 2+/5 2+/5      Abduction 2+/5 2+/5      Gluteus Medius        Adduction        Internal Rotation        External Rotation      KNEE        Flexion        Extension 4+/5 5/5    ANKLE        Dorsiflexion Heel walk intact Heel walk intact      Plantar flexion Bilateral heel raise intact  Bilateral heel raise intact     [standard testing positions unless otherwise noted]   Comments: NA; * denotes pain    Deep Tendon Reflexes: Lumbar  Reflex Level Comments   Patellar L3/4 abnormal: whole body jolted with tapping patella tendon - involuntary?   Achilles L5/S1 unable to elicit   Comments: Unsure if patient understood directions to relax?    Muscle Flexibility:  Adductors (long) - Left: moderate tightness, Right: moderate tightness  Hamstring - Left: moderate tightness, Right: moderate tightness  Iliopsoas - Left: moderate tightness, Right: moderate tightness  Rectus Femorus - Left: minimal tightness, Right: moderate tightness     Palpation:  No tenderness ASIS, iliac crest, quads, greater trochanter, PSIS, sacral sulcus/MYRNA's, gluteals on left or right     Joint Play Assessment:  Hip joint distraction painfree    Special Tests:  *MUSCLE  90/90 Hamstring Test: Positive right and Positive left for hamstring tightness  Trendelenberg Test: Positive right and Positive left for weak gluteus medius or unstable hip  *JOINT/NERVE  RADHA Test: Positive right and Negative left for hip joint pathology, iliopsoas spasm, or sacroiliac dysfunction    Functional Tests:  Heel Raises: Bilateral intact with use of hands  "health counseling, as reflected in patient instructions         reports that she has never smoked. She has never used smokeless tobacco.    Estimated body mass index is 24.71 kg/(m^2) as calculated from the following:    Height as of this encounter: 5' 3.5\" (1.613 m).    Weight as of this encounter: 141 lb 11.2 oz (64.3 kg).       Counseling Resources:  ATP IV Guidelines  Pooled Cohorts Equation Calculator  Breast Cancer Risk Calculator  FRAX Risk Assessment  ICSI Preventive Guidelines  Dietary Guidelines for Americans, 2010  USDA's MyPlate  ASA Prophylaxis  Lung CA Screening    Sonia Rodriguez MD PhD  [facility]  " for balance   Single leg balance: 2 sec right, left able to recover balance independently, Trendelenburg left, compensated right       Outcome Measures:   Not appropriately completed, likely not appropriate for this patient     Initial Treatment   Initial evaluation completed.    Therapeutic Exercise:   Norman stretch x 30 sec left, right - required extensive education and re-teaching, did not issue to HEP  Seated hamstrings stretch x 30 sec left, right cues for hip hinge  Clamshells x 15 left, right     Manual Therapy:   Right hip joint caudal and longitudinal distraction, gr III       Initial Home Program:  * above denotes home program issuance as well  Hamstrings stretch, clamshells    Plan for next session: Review HEP- likely will need pointers on technique. Bike or Nustep, progress into closed chain strengthening - may be easier for her to reproduce at home?    ASSESSMENT   84 year old female presents to therapy with significant decline in prior level of function due to signs and symptoms consistent with right hiph DJD and possible     Outcome:    Benefit from skilled therapy: yes   Rehab potential is good due to positive factors family support, activity tolerance, recent onset and negative factors age    Predicted patient presentation: stable and/or uncomplicated characteristics   Plan of care to increase strength/stability, increase range of motion, decrease pain, improve balance/proprioception, improve activity tolerance, improve quality of gait to address functional limitations listed above.    Goals:  To be obtained by end of this plan of care:  1. Patient independent with modified and progressed home exercise program.  2. Patient will decrease involved hip pain to 2/10 at worst to aid in normalization of gait for independent living.   3. Patient will ambulate community distances on even and uneven terrain with normal gait pattern without assistive device and without loss of balance even after period of  sitting >10 min.  4. Patient will ambulate down stairs in home in the morning without antalgia and 1/10 pain or less to aid in access to home environment.     PLAN   Frequency/Duration: 2 times per week for 8 weeks with tapering as the patient progresses  Skilled training and instruction for the following interventions:  Activities of Daily Living/Self Care (74204)  Gait Training (01372)  Manual Therapy (96514)  Neuromuscular Re-Education (49646)  Therapeutic Activity (90007)  Therapeutic Exercise (63023)  Electrical Stimulation (84840//37645)   Heat/Cold (41075)    The plan of care and goals were established with the patient and patient's family who concurs.  Patient has been given attendance policy at time of initial evaluation.    Patient Education:  Who will be receiving education: patient and patient's family  Are they ready to learn: yes  Preferred learning style: written, verbal, demonstration  Barriers to learning: self-reports a mild dementia diagnosis  Result of initial outlined education: Verbalizes understanding and Demonstrates understanding    THERAPY DAILY BILLING   Primary Insurance: [facility] MEDICARE COMPLETE  Secondary Insurance: N/A    Evaluation Procedures:  Physical Therapy Evaluation: Low Complexity    Timed Procedures:  Manual Therapy, 3 minutes  Therapeutic Exercise, 20 minutes    Untimed Procedures:  No untimed codes were used on this date of service    Total Treatment Time: 50 minutes        The referring provider's electronic or written signature on the evaluation authorizes the therapy plan of care and certifies the need for these services, furnished under this plan of care while under their care.  Electronically sent for physician signature     Discharge Summary Addendum:  Date: 5/22/2017  Total Number of Visits: 1  Surgery Date: None    Patient discharged due to non-compliance/not scheduling more appointments. Son contacted to schedule more appointments, he declined and stated he would  be in contact with clinic to schedule further appointments when able. Clinic has not been contacted.    Status of goals: unknown.     Treatment Category: Hip Pain/Strain  Outcome Measure: Not Applicable/Other   Initial Outcome Score:  NA   Discharge Score Error: Patient Error  Discharge Outcome Score: Not Applicable  Primary Clinician: Prabha Cook

## 2018-02-08 NOTE — NURSING NOTE
"Chief Complaint   Patient presents with     Physical       Initial /77 (BP Location: Right arm, Patient Position: Chair, Cuff Size: Adult Regular)  Pulse 64  Temp 98  F (36.7  C) (Temporal)  Ht 5' 3.5\" (1.613 m)  Wt 141 lb 11.2 oz (64.3 kg)  SpO2 100%  BMI 24.71 kg/m2 Estimated body mass index is 24.71 kg/(m^2) as calculated from the following:    Height as of this encounter: 5' 3.5\" (1.613 m).    Weight as of this encounter: 141 lb 11.2 oz (64.3 kg).  Medication Reconciliation: complete     Kelly Newberry MA      "

## 2018-02-08 NOTE — MR AVS SNAPSHOT
After Visit Summary   2/8/2018    Sindhu Bennett    MRN: [id number]           Patient Information     Date Of Birth          1975        Visit Information        Provider Department      2/8/2018 8:10 AM Sonia Rodriguez MD PhD [facility]        Today's Diagnoses     Routine general medical examination at a health care facility    -  1    Hypertension, goal below 140/90        Screening for malignant neoplasm of cervix        S/P repair of coarctation of aorta        Coarctation of aorta          Care Instructions    Make appointment(s) for:   -- urine test.         Medication(s) prescribed today:    Orders Placed This Encounter   Medications     aspirin 81 MG chewable tablet     Sig: Take 1 tablet (81 mg) by mouth daily     Dispense:  90 tablet     Refill:  3     simvastatin (ZOCOR) 20 MG tablet     Sig: Take 1 tablet (20 mg) by mouth At Bedtime     Dispense:  90 tablet     Refill:  3           Preventive Health Recommendations  Female Ages 40 to 49    Yearly exam:     See your health care provider every year in order to  1. Review health changes.   2. Discuss preventive care.    3. Review your medicines if your doctor prescribed any.      Get a Pap test every three years (unless you have an abnormal result and your provider advises testing more often).      If you get Pap tests with HPV test, you only need to test every 5 years, unless you have an abnormal result. You do not need a Pap test if your uterus was removed (hysterectomy) and you have not had cancer.      You should be tested each year for STDs (sexually transmitted diseases), if you're at risk.       Ask your doctor if you should have a mammogram.      Have a colonoscopy (test for colon cancer) if someone in your family has had colon cancer or polyps before age 50.       Have a cholesterol test every 5 years.       Have a diabetes test (fasting glucose) after age 45. If you are at risk for diabetes, you should  have this test every 3 years.    Shots: Get a flu shot each year. Get a tetanus shot every 10 years.     Nutrition:     Eat at least 5 servings of fruits and vegetables each day.    Eat whole-grain bread, whole-wheat pasta and brown rice instead of white grains and rice.    Talk to your provider about Calcium and Vitamin D.     Lifestyle    Exercise at least 150 minutes a week (an average of 30 minutes a day, 5 days a week). This will help you control your weight and prevent disease.    Limit alcohol to one drink per day.    No smoking.     Wear sunscreen to prevent skin cancer.    See your dentist every six months for an exam and cleaning.          Follow-ups after your visit        Your next 10 appointments already scheduled     Mar 08, 2018  7:00 AM CST   Ech Congenital Adult with [id number]   [facility]chocardiography (Cardiovascular Imaging at [facility])    [address]  [address]  [facility] [id number] [phone]           1.  Please bring or wear a comfortable two-piece outfit. 2.  You may eat, drink and take your normal medicines. 3.  For any questions that cannot be answered, please contact the ordering physician            Mar 08, 2018  8:00 AM CST   Congenital Return with Hi Love MD   [facility] ([facility])    [address]  [facility] [id number] [phone]              Who to contact     If you have questions or need follow up information about today's clinic visit or your schedule please contact [facility] directly at [phone].  Normal or non-critical lab and imaging results will be communicated to you by MyChart, letter or phone within 4 business days after the clinic has received the results. If you do not hear from us within 7 days, please contact the clinic through MyChart or phone. If you have a critical or abnormal lab result, we will notify you by phone  "as soon as possible.  Submit refill requests through MYR or call your pharmacy and they will forward the refill request to us. Please allow 3 business days for your refill to be completed.          Additional Information About Your Visit        MYR Information     MYR gives you secure access to your electronic health record. If you see a primary care provider, you can also send messages to your care team and make appointments. If you have questions, please call your primary care clinic.  If you do not have a primary care provider, please call [phone] and they will assist you.      MYR is an electronic gateway that provides easy, online access to your medical records. With MYR, you can request a clinic appointment, read your test results, renew a prescription or communicate with your care team.     To access your existing account, please contact your [facility] or call [phone] for assistance.        Care EveryWhere ID     This is your Care EveryWhere ID. This could be used by other organizations to access your [place] medical records  [id number]        Your Vitals Were     Pulse Temperature Height Pulse Oximetry BMI (Body Mass Index)       64 98  F (36.7  C) (Temporal) 5' 3.5\" (1.613 m) 100% 24.71 kg/m2        Blood Pressure from Last 3 Encounters:   02/08/18 124/77   01/15/18 120/80   01/02/18 111/70    Weight from Last 3 Encounters:   02/08/18 141 lb 11.2 oz (64.3 kg)   01/15/18 143 lb 12.8 oz (65.2 kg)   01/02/18 141 lb 6.4 oz (64.1 kg)              We Performed the Following     Albumin Random Urine Quantitative with Creat Ratio     HPV High Risk Types DNA Cervical     Pap imaged thin layer screen with HPV - recommended age 30 - 65 years (select HPV order below)          Where to get your medicines      These medications were sent to [facility] - [place], [place] - [address]  [address] [place] [address]     Phone:  " [phone]     aspirin 81 MG chewable tablet    simvastatin 20 MG tablet          Primary Care Provider Office Phone # Fax #    Sonia Rodriguez MD PhD [phone] [address] [facility] [address]        Equal Access to Services     [facility] Center[facility]RE[facility] : Hadii aad ku hadasho Soomaali, waaxda luqadaha, qaybta kaalmada adeegyada, waxay idiin haybrainn adeeg marie lamasoncassandra . So [facility] [phone].    ATENCIÓN: Si habla español, tiene a peralta disposición servicios gratuitos de asistencia lingüística. Llame al [phone].    We comply with applicable federal civil rights laws and [place] laws. We do not discriminate on the basis of race, color, national origin, age, disability, sex, sexual orientation, or gender identity.            Thank you!     Thank you for choosing [facility]  for your care. Our goal is always to provide you with excellent care. Hearing back from our patients is one way we can continue to improve our services. Please take a few minutes to complete the written survey that you may receive in the mail after your visit with us. Thank you!             Your Updated Medication List - Protect others around you: Learn how to safely use, store and throw away your medicines at www.disposemymeds.org.          This list is accurate as of 2/8/18  8:34 AM.  Always use your most recent med list.                   Brand Name Dispense Instructions for use Diagnosis    aspirin 81 MG chewable tablet     90 tablet    Take 1 tablet (81 mg) by mouth daily    S/P repair of coarctation of aorta, Coarctation of aorta       azithromycin 250 MG tablet    ZITHROMAX    6 tablet    Take 2 tablets the first day and then take one a day for 4 days.    Acute otitis externa of left ear, unspecified type       cephALEXin 500 MG capsule    KEFLEX    20 capsule    Take 1 capsule (500 mg) by mouth 2 times daily    Infective otitis externa, left       fluticasone 50 MCG/ACT spray    FLONASE    1 Bottle     Spray 1-2 sprays into both nostrils daily    Left serous otitis media, unspecified chronicity       metoprolol succinate 50 MG 24 hr tablet    TOPROL-XL    90 tablet    TAKE ONE TABLET BY MOUTH ONE TIME DAILY    Hypertension, goal below 140/90       simvastatin 20 MG tablet    ZOCOR    90 tablet    Take 1 tablet (20 mg) by mouth At Bedtime    S/P repair of coarctation of aorta, Coarctation of aorta

## 2018-02-08 NOTE — PATIENT INSTRUCTIONS
Make appointment(s) for:   -- urine test.         Medication(s) prescribed today:    Orders Placed This Encounter   Medications     aspirin 81 MG chewable tablet     Sig: Take 1 tablet (81 mg) by mouth daily     Dispense:  90 tablet     Refill:  3     simvastatin (ZOCOR) 20 MG tablet     Sig: Take 1 tablet (20 mg) by mouth At Bedtime     Dispense:  90 tablet     Refill:  3           Preventive Health Recommendations  Female Ages 40 to 49    Yearly exam:     See your health care provider every year in order to  1. Review health changes.   2. Discuss preventive care.    3. Review your medicines if your doctor prescribed any.      Get a Pap test every three years (unless you have an abnormal result and your provider advises testing more often).      If you get Pap tests with HPV test, you only need to test every 5 years, unless you have an abnormal result. You do not need a Pap test if your uterus was removed (hysterectomy) and you have not had cancer.      You should be tested each year for STDs (sexually transmitted diseases), if you're at risk.       Ask your doctor if you should have a mammogram.      Have a colonoscopy (test for colon cancer) if someone in your family has had colon cancer or polyps before age 50.       Have a cholesterol test every 5 years.       Have a diabetes test (fasting glucose) after age 45. If you are at risk for diabetes, you should have this test every 3 years.    Shots: Get a flu shot each year. Get a tetanus shot every 10 years.     Nutrition:     Eat at least 5 servings of fruits and vegetables each day.    Eat whole-grain bread, whole-wheat pasta and brown rice instead of white grains and rice.    Talk to your provider about Calcium and Vitamin D.     Lifestyle    Exercise at least 150 minutes a week (an average of 30 minutes a day, 5 days a week). This will help you control your weight and prevent disease.    Limit alcohol to one drink per day.    No smoking.     Wear sunscreen to  prevent skin cancer.    See your dentist every six months for an exam and cleaning.

## 2018-02-12 LAB
COPATH REPORT: NORMAL
PAP: NORMAL

## 2018-02-14 LAB
FINAL DIAGNOSIS: NORMAL
HPV HR 12 DNA CVX QL NAA+PROBE: NEGATIVE
HPV16 DNA SPEC QL NAA+PROBE: NEGATIVE
HPV18 DNA SPEC QL NAA+PROBE: NEGATIVE
SPECIMEN DESCRIPTION: NORMAL
SPECIMEN SOURCE CVX/VAG CYTO: NORMAL

## 2018-03-08 ENCOUNTER — HOSPITAL ENCOUNTER (OUTPATIENT)
Dept: CARDIOLOGY | Facility: CLINIC | Age: 43
Discharge: HOME | End: 2018-03-08
Attending: INTERNAL MEDICINE | Admitting: INTERNAL MEDICINE
Payer: COMMERCIAL

## 2018-03-08 ENCOUNTER — OFFICE VISIT (OUTPATIENT)
Dept: CARDIOLOGY | Facility: CLINIC | Age: 43
End: 2018-03-08
Payer: COMMERCIAL

## 2018-03-08 VITALS
SYSTOLIC BLOOD PRESSURE: 119 MMHG | BODY MASS INDEX: 23.9 KG/M2 | DIASTOLIC BLOOD PRESSURE: 80 MMHG | HEIGHT: 64 IN | WEIGHT: 140 LBS | HEART RATE: 61 BPM

## 2018-03-08 DIAGNOSIS — Q25.1 COARCTATION OF AORTA: ICD-10-CM

## 2018-03-08 PROCEDURE — 99213 OFFICE O/P EST LOW 20 MIN: CPT | Performed by: INTERNAL MEDICINE

## 2018-03-08 PROCEDURE — 93303 ECHO TRANSTHORACIC: CPT

## 2018-03-08 NOTE — PATIENT INSTRUCTIONS
"You were seen today in the Adult Congenital and Cardiovascular Genetics Clinic at the [facility].    Cardiology Providers you saw during your visit:  Dr. Diandra Love     Diagnosis:  Coarctation of the Aorat    Results:  The results of your echo were discussed with you today    Recommendations:    1.  Continue to eat a heart healthy, low salt diet.  2.  Continue to get 20-30 minutes of aerobic activity, 4-5 days per week.  Examples of aerobic activity include walking, running, swimming, cycling, etc.  3.  Continue to observe good oral hygiene, with regular dental visits.  4.  No changes today.      Vitals:    03/08/18 0752 03/08/18 0757   BP: 143/82 137/89   Pulse: 70 61   Weight: 63.5 kg (140 lb)    Height: 1.613 m (5' 3.5\")      Exercise restrictions:   Yes__X__  No____         If yes, list restrictions:  Must be allowed to rest if fatigued or SOB      Work restrictions:  Yes____  No_X___         If yes, list restrictions:    FASTING CHOLESTEROL was checked in the last 5 years YES_X__  NO___ 2018  Continue to eat a heart healthy, low salt diet.         ____ Fasting lipid panel order today         ____ No changes in medications          ____ I recommend the following changes in your cholesterol medications.:          ____ Please follow up for cholesterol screening at your primary care physician      Follow-up:  Follow up with Dr. Love in 1 year with an echo    For after hours urgent needs, call [phone] and ask to speak to the Adult Congenital Physician on call.  Mention Job Code 0401.    For emergencies call 911.    For any scheduling needs and to contact your nurse in the Adult Congenital and Cardiovascular Genetics Clinic, please call [facility] , at [phone].    Thank you for your visit today!  If you have questions or concerns about today's visit, please call me.    Gibran Espinal, RN, BSN  Cardiology Care Coordinator  [facility] " Heart  [email or web address]  Ph.[phone]    [facility]  [facility]  Mail Code 2121CK  [address] [id number]

## 2018-03-08 NOTE — LETTER
3/8/2018    Sonia Rodriguez MD PhD  [address]  [place] [address]    RE: Sindhu Bennett       Dear Colleague,    I had the pleasure of seeing Sindhu Bennett in the [facility].    HPI:     Please see dictated note    PAST MEDICAL HISTORY:  Past Medical History:   Diagnosis Date     Coarctation of aorta 2001     Endometriosis      History of pre-eclampsia      HTN (hypertension)      Other and unspecified ovarian cyst        CURRENT MEDICATIONS:  Current Outpatient Prescriptions   Medication Sig Dispense Refill     aspirin 81 MG chewable tablet Take 1 tablet (81 mg) by mouth daily 90 tablet 3     simvastatin (ZOCOR) 20 MG tablet Take 1 tablet (20 mg) by mouth At Bedtime 90 tablet 3     fluticasone (FLONASE) 50 MCG/ACT spray Spray 1-2 sprays into both nostrils daily 1 Bottle 11     metoprolol (TOPROL-XL) 50 MG 24 hr tablet TAKE ONE TABLET BY MOUTH ONE TIME DAILY  90 tablet 3       PAST SURGICAL HISTORY:  Past Surgical History:   Procedure Laterality Date     ANGIOPLASTY       APPENDECTOMY  2006     BIOPSY  '96 & '98    when cyst were removed     C LAP OVARIAN CYSTOTOMY      4 different surgeries       ALLERGIES     Allergies   Allergen Reactions     Penicillins Rash     Was a very red facial rash     Erythromycin      Severe vomiting     Levaquin [Levofloxacin]      Nitroglycerin      Doxycycline Rash     Toradol [Ketorolac Tromethamine]        FAMILY HISTORY:  Family History   Problem Relation Age of Onset     Hypertension Mother      DIABETES Father      Hypertension Father      Cancer - colorectal Maternal Grandfather      C.A.D. Maternal Grandfather      DIABETES Maternal Grandfather      Hypertension Maternal Grandfather      C.A.D. Maternal Uncle      CEREBROVASCULAR DISEASE Maternal Uncle      Hypertension Maternal Uncle      DIABETES Maternal Uncle      DIABETES Maternal Grandmother      Multiple Sclerosis Maternal Grandmother      Hypertension Brother   "    Colon Cancer Paternal Grandfather      Coronary Artery Disease Paternal Uncle      Asthma No family hx of      Breast Cancer No family hx of      Prostate Cancer No family hx of      Thyroid Disease No family hx of      Genetic Disorder No family hx of        SOCIAL HISTORY:  Social History     Social History     Marital status:      Spouse name: N/A     Number of children: 3     Years of education: N/A     Occupational History     home       Social History Main Topics     Smoking status: Never Smoker     Smokeless tobacco: Never Used     Alcohol use Yes      Comment: occ.     Drug use: No     Sexual activity: Yes     Partners: Male     Birth control/ protection: Abstinence, None     Other Topics Concern     Parent/Sibling W/ Cabg, Mi Or Angioplasty Before 65f 55m? Yes     uncle my moms brother     Caffeine Concern No     10 oz coffee a day     Exercise Yes     walking 2-3 miles a day     Social History Narrative       ROS:   Constitutional: No fever, chills, or sweats. No weight gain/loss   ENT: No visual disturbance, ear ache, epistaxis, sore throat  Allergies/Immunologic: Negative.   Respiratory: No cough, hemoptysia  Cardiovascular: As per HPI  GI: No nausea, vomiting, hematemesis, melena, or hematochezia  : No urinary frequency, dysuria, or hematuria  Integument: Negative  Psychiatric: Negative  Neuro: Negative  Endocrinology: Negative   Musculoskeletal: Negative    EXAM:  /80  Pulse 61  Ht 1.613 m (5' 3.5\")  Wt 63.5 kg (140 lb)  BMI 24.41 kg/m2  BLOOD PRESSURE EQUAL IN BOTH ARMS  In general, the patient is a pleasant female in no apparent distress.    HEENT: NC/AT.  PERRLA.  EOMI.  Sclerae white, not injected.  Nares clear.  Pharynx without erythema or exudate.  Dentition intact.    Neck: No adenopathy.  No thyromegaly. Carotids +4/4 bilaterally without bruits.  No jugular venous distension.   Heart: RRR.There is a systolic ejection click. No systolic or " diastolic murmur.   Lungs: CTA.  No ronchi, wheezes, rales.    Abdomen: Soft, nontender, nondistended.  Extremities: No clubbing, cyanosis, or edema.  The pulses are +4/4 at the radial, femoral sites  Neurologic: Alert and oriented to person/place/time, normal speech, gait and affect  Skin: No petechiae, purpura or rash.    Labs:  LIPID RESULTS:  Lab Results   Component Value Date    CHOL 116 02/08/2018    HDL 51 02/08/2018    LDL 48 02/08/2018    TRIG 85 02/08/2018    CHOLHDLRATIO 4.3 09/26/2014    NHDL 65 02/08/2018       LIVER ENZYME RESULTS:  Lab Results   Component Value Date    AST 14 10/12/2017    ALT 23 10/12/2017       CBC RESULTS:  Lab Results   Component Value Date    WBC 5.1 10/13/2017    RBC 4.59 10/13/2017    HGB 13.2 10/13/2017    HCT 39.3 10/13/2017    MCV 86 10/13/2017    MCH 28.8 10/13/2017    MCHC 33.6 10/13/2017    RDW 13.6 10/13/2017     10/13/2017       BMP RESULTS:  Lab Results   Component Value Date     02/08/2018    POTASSIUM 4.6 02/08/2018    CHLORIDE 108 02/08/2018    CO2 28 02/08/2018    ANIONGAP 5 02/08/2018    GLC 90 02/08/2018    BUN 11 02/08/2018    CR 0.77 02/08/2018    GFRESTIMATED 83 02/08/2018    GFRESTBLACK >90 02/08/2018    SAMMIE 8.8 02/08/2018        KIKE Love MD       CC  Patient Care Team:  Sonia Rodriguez MD PhD as PCP - General (Internal Medicine)  GRICEL LOVE    Service Date: 03/08/2018      HISTORY OF PRESENT ILLNESS:  Ms. Bennett is a delightful 42-year-old woman who is known to me.  I last saw her in 11/2016.  She has a past medical history significant for coarctation of the aorta.  Please see that note for details.  Ultimately, she was not diagnosed with coarctation of the aorta until 2001 and she had a stent placement by Dr. Robert with a 10 x 4 angioplasty balloon and an 18 x 4 wall stent dilated with a 12 x 4 angioplasty balloon.  They reported her intraarterial gradient across the coarctation went from a peak of 30 to a peak of 2.         When I last saw her, we did complete imaging.  She had a CT angiogram of the chest 09/2016 and that showed her aorta at the stent measuring 9 x 9 and distal to that 14 x 14.  She had an MR of the head with no evidence of cerebral aneurysm.  She subsequently had evidence of chest pain in 10/2016 and underwent another CT at that time that ruled out dissection.  In 10/2017, she came in with symptoms of chest pain radiating to the arm and neck.  They did a CT again angiogram of the chest and that was negative.  She also underwent a nuclear stress test with exercise and there was no evidence of ischemia and an MR angio of the head was also performed without any concerning findings.  There is also an MR angio of the neck and again without any concerning findings.      Ms. Bennett does have children who one has autism and then a daughter has some speech delays.  They are 2 and 3 years of age.  She also has a son who is in high school and hopes to go to the [employer], and be a cross-country runner in the Olympics.  She is feeling well, a little bit stressed with the younger children and their issues but in general is doing well.  Her job is going well.  Her  is a stay-at-home dad and is planning to do Molecular Imaging training this coming few years, which she is happy he is excited about.  She did undergo an echo today.  I reviewed results and her aorta remains stable.  Her aortic valve is also functioning well without any concerning findings.  Her blood pressure is under good control today.  Her cholesterol is also well controlled.  She remains on aspirin, Zocor and Toprol 50 a day.      IMPRESSION AND PLAN:   1.  Coarctation of the aorta, status post stent placement with an 18 x 4 wall stent 05/29/2001 at [facility] with a mean gradient of 5 on echo.   2.  Hypertension, reasonably controlled.   3.  Episodes of atypical chest pain with normal ascending aorta size and no evidence of  dissection.   4.  Mild hyperlipidemia, on treatment.      DISCUSSION:  It was a pleasure to see Ms. Wilhelm in followup.  Clinically, she is doing well.  She is exercising and has no concerning cardiac symptoms at this time.  We discussed remaining active as she is doing.  Her blood pressure looks well controlled and her cardiac risk factors are also well controlled.  Her echo shows her valve is functioning well.  Her ascending aorta looks good.  She only has a mean gradient of 5 across the coarctation site and her blood pressure in both arms is equal which I was glad to see.  We spent a little bit of time talking about her children today.  It sounds like she is having a little bit of emotional stress with her daughter having some speech delays and her younger son with autism.  She is proud of her older son who is apparently an excellent runner and has big dreams, which is great.  We discussed followup in a year with an echo or, of course, sooner if she has any issues in the interim.  She understands and agrees with the plan as outlined.  All questions were answered.  It was a pleasure to see her.  Please do not hesitate to contact me with any questions or concerns.         HI LOVE MD             D: 2018   T: 2018   MT: yonathan      Name:     YANETH WILHELM   MRN:      [id number]        Account:      [id number]   :      1975           Service Date: 2018      Document: [id number]       Thank you for allowing me to participate in the care of your patient.      Sincerely,     Hi Love MD     [facility]    cc:   Hi Love MD  [address] [facility] [address]  [place] [id number]

## 2018-03-08 NOTE — PROGRESS NOTES
Service Date: 03/08/2018      HISTORY OF PRESENT ILLNESS:  Ms. Bennett is a delightful 42-year-old woman who is known to me.  I last saw her in 11/2016.  She has a past medical history significant for coarctation of the aorta.  Please see that note for details.  Ultimately, she was not diagnosed with coarctation of the aorta until 2001 and she had a stent placement by Dr. Robert with a 10 x 4 angioplasty balloon and an 18 x 4 wall stent dilated with a 12 x 4 angioplasty balloon.  They reported her intraarterial gradient across the coarctation went from a peak of 30 to a peak of 2.        When I last saw her, we did complete imaging.  She had a CT angiogram of the chest 09/2016 and that showed her aorta at the stent measuring 9 x 9 and distal to that 14 x 14.  She had an MR of the head with no evidence of cerebral aneurysm.  She subsequently had evidence of chest pain in 10/2016 and underwent another CT at that time that ruled out dissection.  In 10/2017, she came in with symptoms of chest pain radiating to the arm and neck.  They did a CT again angiogram of the chest and that was negative.  She also underwent a nuclear stress test with exercise and there was no evidence of ischemia and an MR angio of the head was also performed without any concerning findings.  There is also an MR angio of the neck and again without any concerning findings.      Ms. Bennett does have children who one has autism and then a daughter has some speech delays.  They are 2 and 3 years of age.  She also has a son who is in high school and hopes to go to the [employer], [place], and be a cross-country runner in the Olympics.  She is feeling well, a little bit stressed with the younger children and their issues but in general is doing well.  Her job is going well.  Her  is a stay-at-home dad and is planning to do [employer] training this coming few years, which she is happy he is excited about.  She did undergo an  echo today.  I reviewed results and her aorta remains stable.  Her aortic valve is also functioning well without any concerning findings.  Her blood pressure is under good control today.  Her cholesterol is also well controlled.  She remains on aspirin, Zocor and Toprol 50 a day.      IMPRESSION AND PLAN:   1.  Coarctation of the aorta, status post stent placement with an 18 x 4 wall stent 05/29/2001 at [facility] with a mean gradient of 5 on echo.   2.  Hypertension, reasonably controlled.   3.  Episodes of atypical chest pain with normal ascending aorta size and no evidence of dissection.   4.  Mild hyperlipidemia, on treatment.      DISCUSSION:  It was a pleasure to see Ms. Wilhelm in followup.  Clinically, she is doing well.  She is exercising and has no concerning cardiac symptoms at this time.  We discussed remaining active as she is doing.  Her blood pressure looks well controlled and her cardiac risk factors are also well controlled.  Her echo shows her valve is functioning well.  Her ascending aorta looks good.  She only has a mean gradient of 5 across the coarctation site and her blood pressure in both arms is equal which I was glad to see.  We spent a little bit of time talking about her children today.  It sounds like she is having a little bit of emotional stress with her daughter having some speech delays and her younger son with autism.  She is proud of her older son who is apparently an excellent runner and has big dreams, which is great.  We discussed followup in a year with an echo or, of course, sooner if she has any issues in the interim.  She understands and agrees with the plan as outlined.  All questions were answered.  It was a pleasure to see her.  Please do not hesitate to contact me with any questions or concerns.         GRICEL LEON MD             D: 03/08/2018   T: 03/08/2018   MT: yonathan      Name:     YANETH WILHELM   MRN:      [id number]        Account:       [id number]   :      1975           Service Date: 2018      Document: [id number]

## 2018-03-08 NOTE — PROGRESS NOTES
HPI:     Please see dictated note    PAST MEDICAL HISTORY:  Past Medical History:   Diagnosis Date     Coarctation of aorta 2001     Endometriosis      History of pre-eclampsia      HTN (hypertension)      Other and unspecified ovarian cyst        CURRENT MEDICATIONS:  Current Outpatient Prescriptions   Medication Sig Dispense Refill     aspirin 81 MG chewable tablet Take 1 tablet (81 mg) by mouth daily 90 tablet 3     simvastatin (ZOCOR) 20 MG tablet Take 1 tablet (20 mg) by mouth At Bedtime 90 tablet 3     fluticasone (FLONASE) 50 MCG/ACT spray Spray 1-2 sprays into both nostrils daily 1 Bottle 11     metoprolol (TOPROL-XL) 50 MG 24 hr tablet TAKE ONE TABLET BY MOUTH ONE TIME DAILY  90 tablet 3       PAST SURGICAL HISTORY:  Past Surgical History:   Procedure Laterality Date     ANGIOPLASTY       APPENDECTOMY  2006     BIOPSY  '96 & '98    when cyst were removed     C LAP OVARIAN CYSTOTOMY      4 different surgeries       ALLERGIES     Allergies   Allergen Reactions     Penicillins Rash     Was a very red facial rash     Erythromycin      Severe vomiting     Levaquin [Levofloxacin]      Nitroglycerin      Doxycycline Rash     Toradol [Ketorolac Tromethamine]        FAMILY HISTORY:  Family History   Problem Relation Age of Onset     Hypertension Mother      DIABETES Father      Hypertension Father      Cancer - colorectal Maternal Grandfather      C.A.D. Maternal Grandfather      DIABETES Maternal Grandfather      Hypertension Maternal Grandfather      C.A.D. Maternal Uncle      CEREBROVASCULAR DISEASE Maternal Uncle      Hypertension Maternal Uncle      DIABETES Maternal Uncle      DIABETES Maternal Grandmother      Multiple Sclerosis Maternal Grandmother      Hypertension Brother      Colon Cancer Paternal Grandfather      Coronary Artery Disease Paternal Uncle      Asthma No family hx of      Breast Cancer No family hx of      Prostate Cancer No family hx of      Thyroid Disease No family hx of      Genetic  "Disorder No family hx of        SOCIAL HISTORY:  Social History     Social History     Marital status:      Spouse name: N/A     Number of children: 3     Years of education: N/A     Occupational History     home       Social History Main Topics     Smoking status: Never Smoker     Smokeless tobacco: Never Used     Alcohol use Yes      Comment: occ.     Drug use: No     Sexual activity: Yes     Partners: Male     Birth control/ protection: Abstinence, None     Other Topics Concern     Parent/Sibling W/ Cabg, Mi Or Angioplasty Before 65f 55m? Yes     uncle my moms brother     Caffeine Concern No     10 oz coffee a day     Exercise Yes     walking 2-3 miles a day     Social History Narrative       ROS:   Constitutional: No fever, chills, or sweats. No weight gain/loss   ENT: No visual disturbance, ear ache, epistaxis, sore throat  Allergies/Immunologic: Negative.   Respiratory: No cough, hemoptysia  Cardiovascular: As per HPI  GI: No nausea, vomiting, hematemesis, melena, or hematochezia  : No urinary frequency, dysuria, or hematuria  Integument: Negative  Psychiatric: Negative  Neuro: Negative  Endocrinology: Negative   Musculoskeletal: Negative    EXAM:  /80  Pulse 61  Ht 1.613 m (5' 3.5\")  Wt 63.5 kg (140 lb)  BMI 24.41 kg/m2  BLOOD PRESSURE EQUAL IN BOTH ARMS  In general, the patient is a pleasant female in no apparent distress.    HEENT: NC/AT.  PERRLA.  EOMI.  Sclerae white, not injected.  Nares clear.  Pharynx without erythema or exudate.  Dentition intact.    Neck: No adenopathy.  No thyromegaly. Carotids +4/4 bilaterally without bruits.  No jugular venous distension.   Heart: RRR.There is a systolic ejection click. No systolic or diastolic murmur.   Lungs: CTA.  No ronchi, wheezes, rales.    Abdomen: Soft, nontender, nondistended.  Extremities: No clubbing, cyanosis, or edema.  The pulses are +4/4 at the radial, femoral sites  Neurologic: Alert and oriented to " person/place/time, normal speech, gait and affect  Skin: No petechiae, purpura or rash.    Labs:  LIPID RESULTS:  Lab Results   Component Value Date    CHOL 116 02/08/2018    HDL 51 02/08/2018    LDL 48 02/08/2018    TRIG 85 02/08/2018    CHOLHDLRATIO 4.3 09/26/2014    NHDL 65 02/08/2018       LIVER ENZYME RESULTS:  Lab Results   Component Value Date    AST 14 10/12/2017    ALT 23 10/12/2017       CBC RESULTS:  Lab Results   Component Value Date    WBC 5.1 10/13/2017    RBC 4.59 10/13/2017    HGB 13.2 10/13/2017    HCT 39.3 10/13/2017    MCV 86 10/13/2017    MCH 28.8 10/13/2017    MCHC 33.6 10/13/2017    RDW 13.6 10/13/2017     10/13/2017       BMP RESULTS:  Lab Results   Component Value Date     02/08/2018    POTASSIUM 4.6 02/08/2018    CHLORIDE 108 02/08/2018    CO2 28 02/08/2018    ANIONGAP 5 02/08/2018    GLC 90 02/08/2018    BUN 11 02/08/2018    CR 0.77 02/08/2018    GFRESTIMATED 83 02/08/2018    GFRESTBLACK >90 02/08/2018    SAMMIE 8.8 02/08/2018        KIKE Love MD       CC  Patient Care Team:  Sonia Rodriguez MD PhD as PCP - General (Internal Medicine)  GRICEL LOVE

## 2018-03-08 NOTE — MR AVS SNAPSHOT
"              After Visit Summary   3/8/2018    Sindhu Bennett    MRN: [id number]           Patient Information     Date Of Birth          1975        Visit Information        Provider Department      3/8/2018 8:00 AM Hi Love MD [facility]        Today's Diagnoses     Coarctation of aorta          Care Instructions    You were seen today in the Adult Congenital and Cardiovascular Genetics Clinic at the [facility].    Cardiology Providers you saw during your visit:  Dr. Diandra Love     Diagnosis:  Coarctation of the Aorat    Results:  The results of your echo were discussed with you today    Recommendations:    1.  Continue to eat a heart healthy, low salt diet.  2.  Continue to get 20-30 minutes of aerobic activity, 4-5 days per week.  Examples of aerobic activity include walking, running, swimming, cycling, etc.  3.  Continue to observe good oral hygiene, with regular dental visits.  4.  No changes today.      Vitals:    03/08/18 0752 03/08/18 0757   BP: 143/82 137/89   Pulse: 70 61   Weight: 63.5 kg (140 lb)    Height: 1.613 m (5' 3.5\")      Exercise restrictions:   Yes__X__  No____         If yes, list restrictions:  Must be allowed to rest if fatigued or SOB      Work restrictions:  Yes____  No_X___         If yes, list restrictions:    FASTING CHOLESTEROL was checked in the last 5 years YES_X__  NO___ 2018  Continue to eat a heart healthy, low salt diet.         ____ Fasting lipid panel order today         ____ No changes in medications          ____ I recommend the following changes in your cholesterol medications.:          ____ Please follow up for cholesterol screening at your primary care physician      Follow-up:  Follow up with Dr. Love in 1 year with an echo    For after hours urgent needs, call [phone] and ask to speak to the Adult Congenital Physician on call.  Mention Job Code 0401.    For emergencies call " 265.    For any scheduling needs and to contact your nurse in the Adult Congenital and Cardiovascular Genetics Clinic, please call Randolph Low, Procedure , at [phone].    Thank you for your visit today!  If you have questions or concerns about today's visit, please call me.    Gibran Espinal RN, BSN  Cardiology Care Coordinator  [facility]  [email or web address]  [phone]    [facility]  [facility]  Mail Code [id number]  [address] [place] [address]  [id number]           Follow-ups after your visit        Who to contact     If you have questions or need follow up information about today's clinic visit or your schedule please contact [facility] directly at [phone].  Normal or non-critical lab and imaging results will be communicated to you by MyChart, letter or phone within 4 business days after the clinic has received the results. If you do not hear from us within 7 days, please contact the clinic through [employer] or phone. If you have a critical or abnormal lab result, we will notify you by phone as soon as possible.  Submit refill requests through [employer] or call your pharmacy and they will forward the refill request to us. Please allow 3 business days for your refill to be completed.          Additional Information About Your Visit        MyChart Information     [employer] gives you secure access to your electronic health record. If you see a primary care provider, you can also send messages to your care team and make appointments. If you have questions, please call your primary care clinic.  If you do not have a primary care provider, please call [phone] and they will assist you.        Care EveryWhere ID     This is your Care EveryWhere ID. This could be used by other organizations to access your [facility]  "records  [id number]        Your Vitals Were     Pulse Height BMI (Body Mass Index)             61 1.613 m (5' 3.5\") 24.41 kg/m2          Blood Pressure from Last 3 Encounters:   03/08/18 137/89   02/08/18 124/77   01/15/18 120/80    Weight from Last 3 Encounters:   03/08/18 63.5 kg (140 lb)   02/08/18 64.3 kg (141 lb 11.2 oz)   01/15/18 65.2 kg (143 lb 12.8 oz)              We Performed the Following     Follow-Up with Cardiologist        Primary Care Provider Office Phone # Fax #    Sonia Rodriguez MD PhD [phone]       [address]  [facility] [address]        Equal Access to Services     [facility] CenterMIREYA : Hadii jess monahan hadasho Sovanessa, waaxda luqadaha, qaybta kaalmada adeegyada, shannan pinzon . So [facility] [phone].    ATENCIÓN: Si habla español, tiene a peralta disposición servicios gratuitos de asistencia lingüística. Llame al [phone].    We comply with applicable federal civil rights laws and [place] laws. We do not discriminate on the basis of race, color, national origin, age, disability, sex, sexual orientation, or gender identity.            Thank you!     Thank you for choosing [facility]  for your care. Our goal is always to provide you with excellent care. Hearing back from our patients is one way we can continue to improve our services. Please take a few minutes to complete the written survey that you may receive in the mail after your visit with us. Thank you!             Your Updated Medication List - Protect others around you: Learn how to safely use, store and throw away your medicines at www.disposemymeds.org.          This list is accurate as of 3/8/18  8:46 AM.  Always use your most recent med list.                   Brand Name Dispense Instructions for use Diagnosis    aspirin 81 MG chewable tablet     90 tablet    Take 1 tablet (81 mg) by mouth daily    S/P repair of coarctation of aorta, Coarctation of aorta    "    fluticasone 50 MCG/ACT spray    FLONASE    1 Bottle    Spray 1-2 sprays into both nostrils daily    Left serous otitis media, unspecified chronicity       metoprolol succinate 50 MG 24 hr tablet    TOPROL-XL    90 tablet    TAKE ONE TABLET BY MOUTH ONE TIME DAILY    Hypertension, goal below 140/90       simvastatin 20 MG tablet    ZOCOR    90 tablet    Take 1 tablet (20 mg) by mouth At Bedtime    S/P repair of coarctation of aorta, Coarctation of aorta

## 2018-03-08 NOTE — NURSING NOTE
Chief Complaint   Patient presents with     Congenital Heart Disease        Cardiac Testing: Patient given instructions regarding  echocardiogram . Discussed purpose, preparation, procedure and when to expect results reported back to the patient. Patient demonstrated understanding of this information and agreed to call with further questions or concerns.  Med Reconcile: Reviewed and verified all current medications with the patient. The updated medication list was printed and given to the patient.  Return Appointment: Patient given instructions regarding scheduling next clinic visit. Patient demonstrated understanding of this information and agreed to call with further questions or concerns.  Patient stated she understood all health information given and agreed to call with further questions or concerns.     Gibran Espinal RN, BSN  Cardiology Care Coordinator  [facility]  [email or web address]  [phone]

## 2018-03-08 NOTE — LETTER
3/8/2018      Sonia Rodriguez MD PhD  [address]  [place] [address]      RE: Sindhu SHANKAR Donald       Dear Colleague,    I had the pleasure of seeing Sindhu Bennett in the [facility].    Service Date: 03/08/2018      HISTORY OF PRESENT ILLNESS:  Ms. Bennett is a delightful 42-year-old woman who is known to me.  I last saw her in 11/2016.  She has a past medical history significant for coarctation of the aorta.  Please see that note for details.  Ultimately, she was not diagnosed with coarctation of the aorta until 2001 and she had a stent placement by Dr. Robert with a 10 x 4 angioplasty balloon and an 18 x 4 wall stent dilated with a 12 x 4 angioplasty balloon.  They reported her intraarterial gradient across the coarctation went from a peak of 30 to a peak of 2.        When I last saw her, we did complete imaging.  She had a CT angiogram of the chest 09/2016 and that showed her aorta at the stent measuring 9 x 9 and distal to that 14 x 14.  She had an MR of the head with no evidence of cerebral aneurysm.  She subsequently had evidence of chest pain in 10/2016 and underwent another CT at that time that ruled out dissection.  In 10/2017, she came in with symptoms of chest pain radiating to the arm and neck.  They did a CT again angiogram of the chest and that was negative.  She also underwent a nuclear stress test with exercise and there was no evidence of ischemia and an MR angio of the head was also performed without any concerning findings.  There is also an MR angio of the neck and again without any concerning findings.      Ms. Bennett does have children who one has autism and then a daughter has some speech delays.  They are 2 and 3 years of age.  She also has a son who is in high school and hopes to go to the [employer] [place], and be a cross-country runner in the Olympics.  She is feeling well, a little bit stressed with the  younger children and their issues but in general is doing well.  Her job is going well.  Her  is a stay-at-home dad and is planning to do [employer] training this coming few years, which she is happy he is excited about.  She did undergo an echo today.  I reviewed results and her aorta remains stable.  Her aortic valve is also functioning well without any concerning findings.  Her blood pressure is under good control today.  Her cholesterol is also well controlled.  She remains on aspirin, Zocor and Toprol 50 a day.      IMPRESSION AND PLAN:   1.  Coarctation of the aorta, status post stent placement with an 18 x 4 wall stent 05/29/2001 at [facility] with a mean gradient of 5 on echo.   2.  Hypertension, reasonably controlled.   3.  Episodes of atypical chest pain with normal ascending aorta size and no evidence of dissection.   4.  Mild hyperlipidemia, on treatment.      DISCUSSION:  It was a pleasure to see Ms. Bennett in followup.  Clinically, she is doing well.  She is exercising and has no concerning cardiac symptoms at this time.  We discussed remaining active as she is doing.  Her blood pressure looks well controlled and her cardiac risk factors are also well controlled.  Her echo shows her valve is functioning well.  Her ascending aorta looks good.  She only has a mean gradient of 5 across the coarctation site and her blood pressure in both arms is equal which I was glad to see.  We spent a little bit of time talking about her children today.  It sounds like she is having a little bit of emotional stress with her daughter having some speech delays and her younger son with autism.  She is proud of her older son who is apparently an excellent runner and has big dreams, which is great.  We discussed followup in a year with an echo or, of course, sooner if she has any issues in the interim.  She understands and agrees with the plan as outlined.  All questions were answered.  It was a pleasure to see  her.  Please do not hesitate to contact me with any questions or concerns.         GRICEL LEON MD             D: 2018   T: 2018   MT: yonathan      Name:     YANETH WILHELM   MRN:      [id number]        Account:      [id number]   :      1975           Service Date: 2018      Document: [id number]         Outpatient Encounter Prescriptions as of 3/8/2018   Medication Sig Dispense Refill     aspirin 81 MG chewable tablet Take 1 tablet (81 mg) by mouth daily 90 tablet 3     simvastatin (ZOCOR) 20 MG tablet Take 1 tablet (20 mg) by mouth At Bedtime 90 tablet 3     fluticasone (FLONASE) 50 MCG/ACT spray Spray 1-2 sprays into both nostrils daily 1 Bottle 11     metoprolol (TOPROL-XL) 50 MG 24 hr tablet TAKE ONE TABLET BY MOUTH ONE TIME DAILY  90 tablet 3     [DISCONTINUED] cephALEXin (KEFLEX) 500 MG capsule Take 1 capsule (500 mg) by mouth 2 times daily (Patient not taking: Reported on 2018) 20 capsule 0     [DISCONTINUED] azithromycin (ZITHROMAX) 250 MG tablet Take 2 tablets the first day and then take one a day for 4 days. (Patient not taking: Reported on 2018) 6 tablet 0     No facility-administered encounter medications on file as of 3/8/2018.        Again, thank you for allowing me to participate in the care of your patient.      Sincerely,    Gricel Leon MD     [facility]

## 2018-09-20 ENCOUNTER — OFFICE VISIT (OUTPATIENT)
Dept: FAMILY MEDICINE | Facility: CLINIC | Age: 43
End: 2018-09-20
Payer: MEDICAID

## 2018-09-20 VITALS
HEART RATE: 76 BPM | RESPIRATION RATE: 16 BRPM | SYSTOLIC BLOOD PRESSURE: 145 MMHG | TEMPERATURE: 98.1 F | BODY MASS INDEX: 25.61 KG/M2 | OXYGEN SATURATION: 100 % | DIASTOLIC BLOOD PRESSURE: 94 MMHG | HEIGHT: 64 IN | WEIGHT: 150 LBS

## 2018-09-20 DIAGNOSIS — R10.84 ABDOMINAL PAIN, GENERALIZED: Primary | ICD-10-CM

## 2018-09-20 DIAGNOSIS — R21 RASH: ICD-10-CM

## 2018-09-20 DIAGNOSIS — R30.0 DYSURIA: ICD-10-CM

## 2018-09-20 LAB
ALBUMIN UR-MCNC: NEGATIVE MG/DL
APPEARANCE UR: CLEAR
BASOPHILS # BLD AUTO: 0 10E9/L (ref 0–0.2)
BASOPHILS NFR BLD AUTO: 0.5 %
BILIRUB UR QL STRIP: NEGATIVE
COLOR UR AUTO: YELLOW
DIFFERENTIAL METHOD BLD: NORMAL
EOSINOPHIL # BLD AUTO: 0.4 10E9/L (ref 0–0.7)
EOSINOPHIL NFR BLD AUTO: 4.1 %
ERYTHROCYTE [DISTWIDTH] IN BLOOD BY AUTOMATED COUNT: 13.6 % (ref 10–15)
GLUCOSE UR STRIP-MCNC: NEGATIVE MG/DL
HCT VFR BLD AUTO: 39.9 % (ref 35–47)
HGB BLD-MCNC: 13.2 G/DL (ref 11.7–15.7)
HGB UR QL STRIP: NEGATIVE
KETONES UR STRIP-MCNC: NEGATIVE MG/DL
LEUKOCYTE ESTERASE UR QL STRIP: NEGATIVE
LYMPHOCYTES # BLD AUTO: 2.1 10E9/L (ref 0.8–5.3)
LYMPHOCYTES NFR BLD AUTO: 24.2 %
MCH RBC QN AUTO: 28.4 PG (ref 26.5–33)
MCHC RBC AUTO-ENTMCNC: 33.1 G/DL (ref 31.5–36.5)
MCV RBC AUTO: 86 FL (ref 78–100)
MONOCYTES # BLD AUTO: 0.8 10E9/L (ref 0–1.3)
MONOCYTES NFR BLD AUTO: 8.8 %
NEUTROPHILS # BLD AUTO: 5.4 10E9/L (ref 1.6–8.3)
NEUTROPHILS NFR BLD AUTO: 62.4 %
NITRATE UR QL: NEGATIVE
PH UR STRIP: 6.5 PH (ref 5–7)
PLATELET # BLD AUTO: 190 10E9/L (ref 150–450)
RBC # BLD AUTO: 4.64 10E12/L (ref 3.8–5.2)
SOURCE: NORMAL
SP GR UR STRIP: 1.01 (ref 1–1.03)
SPECIMEN SOURCE: NORMAL
UROBILINOGEN UR STRIP-ACNC: 0.2 EU/DL (ref 0.2–1)
WBC # BLD AUTO: 8.7 10E9/L (ref 4–11)
WET PREP SPEC: NORMAL

## 2018-09-20 PROCEDURE — 81003 URINALYSIS AUTO W/O SCOPE: CPT | Performed by: PHYSICIAN ASSISTANT

## 2018-09-20 PROCEDURE — 99214 OFFICE O/P EST MOD 30 MIN: CPT | Performed by: PHYSICIAN ASSISTANT

## 2018-09-20 PROCEDURE — 36415 COLL VENOUS BLD VENIPUNCTURE: CPT | Performed by: PHYSICIAN ASSISTANT

## 2018-09-20 PROCEDURE — 85025 COMPLETE CBC W/AUTO DIFF WBC: CPT | Performed by: PHYSICIAN ASSISTANT

## 2018-09-20 PROCEDURE — 87210 SMEAR WET MOUNT SALINE/INK: CPT | Performed by: PHYSICIAN ASSISTANT

## 2018-09-20 RX ORDER — DESONIDE 0.5 MG/G
CREAM TOPICAL
Qty: 30 G | Refills: 0 | Status: SHIPPED | OUTPATIENT
Start: 2018-09-20 | End: 2018-10-24

## 2018-09-20 ASSESSMENT — PAIN SCALES - GENERAL: PAINLEVEL: MODERATE PAIN (4)

## 2018-09-20 NOTE — PROGRESS NOTES
Esau Manley  Your blood counts, hemoglobin and platelets were normal.   As we reviewed your wet prep did not show any yeast and your urine was normal.   Please call or MyChart my office with any questions or concerns.    Sahara Howard, PAC

## 2018-09-20 NOTE — MR AVS SNAPSHOT
After Visit Summary   9/20/2018    Sindhu Bennett    MRN: [id number]           Patient Information     Date Of Birth          1975        Visit Information        Provider Department      9/20/2018 3:40 PM Sahara Howard PA-C [facility]        Today's Diagnoses     Dysuria    -  1    Rash        Abdominal pain, generalized          Care Instructions    Schedule abdominal and pelvic ultrasound tomorrow at [facility] ([phone]).          Return urgently if any change in symptoms like increasing pain, fever, vomiting or other change in symptoms.      Apply cream to affected area three times a day for next 14 days.  Follow up with primary if no improvement in symptoms.     At [facility], we strive to deliver an exceptional experience to you, every time we see you.  If you receive a survey in the mail, please send us back your thoughts. We really do value your feedback.    Suggested websites for health information:  Www.[employer].Bplats : Up to date and easily searchable information on multiple topics.  Www.medlineplus.gov : medication info, interactive tutorials, watch real surgeries online  Www.familydoctor.org : good info from the Academy of Family Physicians  Www.cdc.gov : public health info, travel advisories, epidemics (H1N1)  Www.aap.org : children's health info, normal development, vaccinations  Www.health[place]state[place]us : [place] dept of health, public health issues in [place], N1N1    Your care team:     Family Medicine   KASHMIR Burdick MD Emily Bunt, APRN CNP   S. MD Shoshana Griffith MD Angela Wermerskirchen, MD         Clinic hours: Monday - Wednesday 7 am-7 pm   Thursdays and Fridays 7 am-5 pm.     [facility]: Monday - Friday 11 am-9 pm,   Saturday and Sunday 9 am-5 pm.    [facility]: Monday -Thursday 8 am-8 pm; Friday 8 am-6 pm;  Saturday and Sunday 9 am-5 pm.     [facility]: Monday - Thursday 8 am - 7 pm; Friday 8 am - 6 pm    Clinic: [phone]   [facility]: [phone]   [facility]: [phone]                    Follow-ups after your visit        Future tests that were ordered for you today     Open Future Orders        Priority Expected Expires Ordered    US Abdomen Complete Routine 12/19/2018 3/19/2019 9/20/2018    US Pelvic Complete w Transvaginal Routine  9/20/2019 9/20/2018            Who to contact     If you have questions or need follow up information about today's clinic visit or your schedule please contact [facility] directly at [phone].  Normal or non-critical lab and imaging results will be communicated to you by MyChart, letter or phone within 4 business days after the clinic has received the results. If you do not hear from us within 7 days, please contact the clinic through [employer] Tax[employer]art or phone. If you have a critical or abnormal lab result, we will notify you by phone as soon as possible.  Submit refill requests through [email or web address] or call your pharmacy and they will forward the refill request to us. Please allow 3 business days for your refill to be completed.          Additional Information About Your Visit        Fly TaxiharHItviews Information     [email or web address] gives you secure access to your electronic health record. If you see a primary care provider, you can also send messages to your care team and make appointments. If you have questions, please call your primary care clinic.  If you do not have a primary care provider, please call [phone] and they will assist you.        Care EveryWhere ID     This is your Care EveryWhere ID. This could be used by other organizations to access your [place] medical records  [id number]        Your Vitals Were     Pulse Temperature Respirations Height Last Period Pulse Oximetry    76 98.1  F (36.7  C) (Oral) 16 1.613 m  "(5' 3.5\") 09/05/2018 (Exact Date) 100%    Breastfeeding? BMI (Body Mass Index)                No 26.15 kg/m2           Blood Pressure from Last 3 Encounters:   09/20/18 (!) 145/94   03/08/18 119/80   02/08/18 124/77    Weight from Last 3 Encounters:   09/20/18 68 kg (150 lb)   03/08/18 63.5 kg (140 lb)   02/08/18 64.3 kg (141 lb 11.2 oz)              We Performed the Following     *UA reflex to Microscopic and Culture ([place] and [facility] (except [place] and [place])     CBC with platelets differential     Wet prep          Today's Medication Changes          These changes are accurate as of 9/20/18  4:06 PM.  If you have any questions, ask your nurse or doctor.               Start taking these medicines.        Dose/Directions    desonide 0.05 % cream   Commonly known as:  DESOWEN   Used for:  Rash   Started by:  Sahara Howard PA-C        Apply sparingly to affected area three times daily as needed for 14 days .   Quantity:  30 g   Refills:  0            Where to get your medicines      These medications were sent to [facility] - [place], [place] - [address]  [address], [facility] [address]     Phone:  [phone]     desonide 0.05 % cream                Primary Care Provider Office Phone # Fax #    Sonia Rodriguez MD PhD [phone]       [address]  [facility] [address]        Equal Access to Services     SHREYA JEROME AH: Hadii aad ku hadasho Soomaali, waaxda luqadaha, qaybta kaalmada adeegyada, shannan rapp. So [facility] [phone].    ATENCIÓN: Si habla español, tiene a peralta disposición servicios gratuitos de asistencia lingüística. Ayaka al [phone].    We comply with applicable federal civil rights laws and [place] laws. We do not discriminate on the basis of race, color, national origin, age, disability, sex, sexual orientation, or gender identity.            Thank you!     Thank you for choosing [facility]" for your care. Our goal is always to provide you with excellent care. Hearing back from our patients is one way we can continue to improve our services. Please take a few minutes to complete the written survey that you may receive in the mail after your visit with us. Thank you!             Your Updated Medication List - Protect others around you: Learn how to safely use, store and throw away your medicines at www.disposemymeds.org.          This list is accurate as of 9/20/18  4:06 PM.  Always use your most recent med list.                   Brand Name Dispense Instructions for use Diagnosis    aspirin 81 MG chewable tablet     90 tablet    Take 1 tablet (81 mg) by mouth daily    S/P repair of coarctation of aorta, Coarctation of aorta       desonide 0.05 % cream    DESOWEN    30 g    Apply sparingly to affected area three times daily as needed for 14 days .    Rash       fluticasone 50 MCG/ACT spray    FLONASE    1 Bottle    Spray 1-2 sprays into both nostrils daily    Left serous otitis media, unspecified chronicity       metoprolol succinate 50 MG 24 hr tablet    TOPROL-XL    90 tablet    TAKE ONE TABLET BY MOUTH ONE TIME DAILY    Hypertension, goal below 140/90       simvastatin 20 MG tablet    ZOCOR    90 tablet    Take 1 tablet (20 mg) by mouth At Bedtime    S/P repair of coarctation of aorta, Coarctation of aorta

## 2018-09-20 NOTE — PROGRESS NOTES
SUBJECTIVE:   Sindhu Bennett is a 43 year old female who presents to clinic today for the following health issues:    URINARY TRACT SYMPTOMS  Onset: Tuesday    Description:   Painful urination (Dysuria): YES  Blood in urine (Hematuria): no   Delay in urine (Hesitency): YES    Intensity: moderate    Progression of Symptoms:  worsening    Accompanying Signs & Symptoms:  Fever/chills: YES  Flank pain YES  Nausea and vomiting: YES  Any vaginal symptoms: none  Abdominal/Pelvic Pain: no     History:   History of frequent UTI's: no   History of kidney stones: no   Sexually Active: YES  Possibility of pregnancy: No    Precipitating factors:   none    Therapies Tried and outcome: Increase fluid intake    In past Thought had bladder infection and turned out ot be yeast infection.    Symptoms same as last time.  Doesn't hurt when I pee.  Constantly pain and urgency of urination.  Feels like bladder is constantly full.  Left low back pain- just started  Slight belly pain left lower quadrant yesterday.  Have been nauseated have not thrown up  No vaginal discharge.  But didn't have that last time either  Not drank fluids today and more pain today    Rash at nape of neck pruritic for couple months- does have a lot stress- sister was diagnosed with stage 4 cervical cancer and  within months in her 40s     Problem list and histories reviewed & adjusted, as indicated.  Additional history: as documented    Patient Active Problem List   Diagnosis     Coarctation of aorta     Essential hypertension     CARDIOVASCULAR SCREENING; LDL GOAL LESS THAN 130     HSV infection     S/P repair of coarctation of aorta     Hypertension, goal below 140/90     Past Surgical History:   Procedure Laterality Date     ANGIOPLASTY       APPENDECTOMY  2006     BIOPSY   &     when cyst were removed     C LAP OVARIAN CYSTOTOMY      4 different surgeries       Social History   Substance Use Topics     Smoking status: Never Smoker      Smokeless tobacco: Never Used     Alcohol use Yes      Comment: occ.     Family History   Problem Relation Age of Onset     Hypertension Mother      Diabetes Father      Hypertension Father      Cancer - colorectal Maternal Grandfather      C.A.D. Maternal Grandfather      Diabetes Maternal Grandfather      Hypertension Maternal Grandfather      C.A.D. Maternal Uncle      Cerebrovascular Disease Maternal Uncle      Hypertension Maternal Uncle      Diabetes Maternal Uncle      Diabetes Maternal Grandmother      Multiple Sclerosis Maternal Grandmother      Hypertension Brother      Colon Cancer Paternal Grandfather      Coronary Artery Disease Paternal Uncle      Cervical Cancer Sister      stage 4 and now  .      Asthma No family hx of      Breast Cancer No family hx of      Prostate Cancer No family hx of      Thyroid Disease No family hx of      Genetic Disorder No family hx of          Current Outpatient Prescriptions   Medication Sig Dispense Refill     aspirin 81 MG chewable tablet Take 1 tablet (81 mg) by mouth daily 90 tablet 3     desonide (DESOWEN) 0.05 % cream Apply sparingly to affected area three times daily as needed for 14 days . 30 g 0     fluticasone (FLONASE) 50 MCG/ACT spray Spray 1-2 sprays into both nostrils daily 1 Bottle 11     metoprolol (TOPROL-XL) 50 MG 24 hr tablet TAKE ONE TABLET BY MOUTH ONE TIME DAILY  90 tablet 3     simvastatin (ZOCOR) 20 MG tablet Take 1 tablet (20 mg) by mouth At Bedtime 90 tablet 3     BP Readings from Last 3 Encounters:   18 (!) 145/94   18 119/80   18 124/77    Wt Readings from Last 3 Encounters:   18 68 kg (150 lb)   18 63.5 kg (140 lb)   18 64.3 kg (141 lb 11.2 oz)                    Reviewed and updated as needed this visit by clinical staff  Tobacco  Allergies  Meds  Problems  Med Hx  Surg Hx  Fam Hx  Soc Hx        Reviewed and updated as needed this visit by Provider  Tobacco  Allergies  Meds  Problems   "Med Hx  Surg Hx  Fam Hx  Soc Hx          ROS:  Constitutional, HEENT, cardiovascular, pulmonary, gi and gu systems are negative, except as otherwise noted.    OBJECTIVE:     BP (!) 145/94 (BP Location: Right arm, Patient Position: Chair, Cuff Size: Adult Regular)  Pulse 76  Temp 98.1  F (36.7  C) (Oral)  Resp 16  Ht 1.613 m (5' 3.5\")  Wt 68 kg (150 lb)  LMP 09/05/2018 (Exact Date)  SpO2 100%  Breastfeeding? No  BMI 26.15 kg/m2  Body mass index is 26.15 kg/(m^2).  GENERAL: healthy, alert and no distress  NECK: no adenopathy, no asymmetry, masses, or scars and thyroid normal to palpation  RESP: lungs clear to auscultation - no rales, rhonchi or wheezes  CV: regular rate and rhythm, normal S1 S2, no S3 or S4, no murmur, click or rub, no peripheral edema and peripheral pulses strong  ABDOMEN: soft, nontender, no hepatosplenomegaly, no masses and bowel sounds normal   (female): normal female external genitalia, normal urethral meatus, vaginal mucosa, normal cervix/adnexa/uterus without masses or discharge  MS: no gross musculoskeletal defects noted, no edema  Nape of neck and into hairline with erythematous plaque without silver scale.  No rash of elbows, knees, trunk or extremities  Diagnostic Test Results:  Results for orders placed or performed in visit on 09/20/18   *UA reflex to Microscopic and Culture ([place] and [facility] (except [place] and [place])   Result Value Ref Range    Color Urine Yellow     Appearance Urine Clear     Glucose Urine Negative NEG^Negative mg/dL    Bilirubin Urine Negative NEG^Negative    Ketones Urine Negative NEG^Negative mg/dL    Specific Gravity Urine 1.010 1.003 - 1.035    Blood Urine Negative NEG^Negative    pH Urine 6.5 5.0 - 7.0 pH    Protein Albumin Urine Negative NEG^Negative mg/dL    Urobilinogen Urine 0.2 0.2 - 1.0 EU/dL    Nitrite Urine Negative NEG^Negative    Leukocyte Esterase Urine Negative NEG^Negative    Source Midstream Urine    CBC with platelets " differential   Result Value Ref Range    WBC 8.7 4.0 - 11.0 10e9/L    RBC Count 4.64 3.8 - 5.2 10e12/L    Hemoglobin 13.2 11.7 - 15.7 g/dL    Hematocrit 39.9 35.0 - 47.0 %    MCV 86 78 - 100 fl    MCH 28.4 26.5 - 33.0 pg    MCHC 33.1 31.5 - 36.5 g/dL    RDW 13.6 10.0 - 15.0 %    Platelet Count 190 150 - 450 10e9/L    % Neutrophils 62.4 %    % Lymphocytes 24.2 %    % Monocytes 8.8 %    % Eosinophils 4.1 %    % Basophils 0.5 %    Absolute Neutrophil 5.4 1.6 - 8.3 10e9/L    Absolute Lymphocytes 2.1 0.8 - 5.3 10e9/L    Absolute Monocytes 0.8 0.0 - 1.3 10e9/L    Absolute Eosinophils 0.4 0.0 - 0.7 10e9/L    Absolute Basophils 0.0 0.0 - 0.2 10e9/L    Diff Method Automated Method    Wet prep   Result Value Ref Range    Specimen Description Vagina     Wet Prep No Trichomonas seen     Wet Prep No clue cells seen     Wet Prep No yeast seen        ASSESSMENT/PLAN:             1. Abdominal pain, generalized  Normal CBC and normal UA.  Abdomen and pelvic ultrasound pending.  Negative wet prep  - US Abdomen Complete; Future  - US Pelvic Complete w Transvaginal; Future  - CBC with platelets differential    2. Rash  Area of localization is concerning for psoriasis - trial of desowen cream- follow up with PCP if no improvement in symptoms   - desonide (DESOWEN) 0.05 % cream; Apply sparingly to affected area three times daily as needed for 14 days .  Dispense: 30 g; Refill: 0    3. Dysuria  Normal urinalysis   - *UA reflex to Microscopic and Culture ([place] and [facility] (except [place] and [place])  - Wet prep    Patient Instructions     Schedule abdominal and pelvic ultrasound tomorrow at [facility] ([phone]).          Return urgently if any change in symptoms like increasing pain, fever, vomiting or other change in symptoms.      Apply cream to affected area three times a day for next 14 days.  Follow up with primary if no improvement in symptoms.     At [facility], we strive to deliver an exceptional experience to you, every time we see you.  If you receive a survey in the mail, please send us back your thoughts. We really do value your feedback.    Suggested websites for health information:  Www.MyDeals.com.org : Up to date and easily searchable information on multiple topics.  Www.medlineplus.gov : medication info, interactive tutorials, watch real surgeries online  Www.familydoctor.org : good info from the Academy of Family Physicians  Www.cdc.gov : public health info, travel advisories, epidemics (H1N1)  Www.aap.org : children's health info, normal development, vaccinations  Www.health.[facility].[place].us : [place] dept of health, public health issues in [place], N1N1    Your care team:     Family Medicine   KASHMIR Burdick MD Emily Bunt, TREVOR [facility]   S. MD Shoshana Griffith MD Angela Wermerskirchen, MD         Clinic hours: Monday - Wednesday 7 am-7 pm   Thursdays and Fridays 7 am-5 pm.     [facility]: Monday - Friday 11 am-9 pm,   Saturday and Sunday 9 am-5 pm.    [facility]: Monday -Thursday 8 am-8 pm; Friday 8 am-6 pm; Saturday and Sunday 9 am-5 pm.     [facility]: Monday - Thursday 8 am - 7 pm; Friday 8 am - 6 pm    Clinic: [phone]   [facility]: [phone]   [facility]: [phone]                 Sahara Howard PA-C  [facility]

## 2018-09-20 NOTE — PATIENT INSTRUCTIONS
Schedule abdominal and pelvic ultrasound tomorrow at [facility] ([phone]).          Return urgently if any change in symptoms like increasing pain, fever, vomiting or other change in symptoms.      Apply cream to affected area three times a day for next 14 days.  Follow up with primary if no improvement in symptoms.     At [facility], we strive to deliver an exceptional experience to you, every time we see you.  If you receive a survey in the mail, please send us back your thoughts. We really do value your feedback.    Suggested websites for health information:  Www.Medicalis.org : Up to date and easily searchable information on multiple topics.  Www.[employer].gov : medication info, interactive tutorials, watch real surgeries online  Www.familydoctor.org : good info from the Academy of Family Physicians  Www.cdc.gov : public health info, travel advisories, epidemics (H1N1)  Www.aap.org : children's health info, normal development, vaccinations  Www.health.[facility].[place].us : [place] dept of health, public health issues in [place], N1N1    Your care team:     Family Medicine   KASHMIR Burdick MD Emily Bunt, APRN CNP   S. MD Shoshana Griffith MD Angela Wermerskirchen, MD         Clinic hours: Monday - Wednesday 7 am-7 pm   Thursdays and Fridays 7 am-5 pm.     [facility]: Monday - Friday 11 am-9 pm,   Saturday and Sunday 9 am-5 pm.    [facility]: Monday -Thursday 8 am-8 pm; Friday 8 am-6 pm; Saturday and Sunday 9 am-5 pm.     [facility]: Monday - Thursday 8 am - 7 pm; Friday 8 am - 6 pm    Clinic: [phone]   [facility]: [phone]   [facility]: [phone]

## 2018-09-21 ENCOUNTER — RADIANT APPOINTMENT (OUTPATIENT)
Dept: ULTRASOUND IMAGING | Facility: CLINIC | Age: 43
End: 2018-09-21
Attending: PHYSICIAN ASSISTANT
Payer: MEDICAID

## 2018-09-21 DIAGNOSIS — R10.84 ABDOMINAL PAIN, GENERALIZED: ICD-10-CM

## 2018-09-21 PROCEDURE — 76700 US EXAM ABDOM COMPLETE: CPT

## 2018-09-21 PROCEDURE — 76830 TRANSVAGINAL US NON-OB: CPT

## 2018-09-21 PROCEDURE — 76856 US EXAM PELVIC COMPLETE: CPT | Mod: 59

## 2018-09-21 NOTE — PROGRESS NOTES
Esau Manley  Your pelvic ultrasound showed some prominent blood vessels and an ovarian cyst.   I recommend follow up with gynecology at [facility] ([phone]) to review your symptoms and ultrasound results.   Return urgently if any change in symptoms like increasing pain, fever, vomiting or other change in symptoms.   Sahara Howard, PAC

## 2018-10-05 ENCOUNTER — OFFICE VISIT (OUTPATIENT)
Dept: OBGYN | Facility: CLINIC | Age: 43
End: 2018-10-05
Payer: MEDICAID

## 2018-10-05 VITALS
SYSTOLIC BLOOD PRESSURE: 124 MMHG | BODY MASS INDEX: 26.4 KG/M2 | WEIGHT: 151.4 LBS | DIASTOLIC BLOOD PRESSURE: 76 MMHG | HEART RATE: 62 BPM

## 2018-10-05 DIAGNOSIS — R10.2 PELVIC PAIN IN FEMALE: Primary | ICD-10-CM

## 2018-10-05 DIAGNOSIS — Z87.42 HX OF ENDOMETRIOSIS: ICD-10-CM

## 2018-10-05 DIAGNOSIS — Z87.42 HX OF OVARIAN CYST: ICD-10-CM

## 2018-10-05 PROCEDURE — 99203 OFFICE O/P NEW LOW 30 MIN: CPT | Performed by: OBSTETRICS & GYNECOLOGY

## 2018-10-05 NOTE — MR AVS SNAPSHOT
After Visit Summary   10/5/2018    Sindhu Bennett    MRN: [id number]           Patient Information     Date Of Birth          1975        Visit Information        Provider Department      10/5/2018 10:30 AM Dianne Snell MD [facility] [place]        Today's Diagnoses     Pelvic pain in female    -  1    Hx of endometriosis        Hx of ovarian cyst           Follow-ups after your visit        Follow-up notes from your care team     Return if symptoms worsen or fail to improve.      Who to contact     If you have questions or need follow up information about today's clinic visit or your schedule please contact [facility] directly at [phone].  Normal or non-critical lab and imaging results will be communicated to you by MyChart, letter or phone within 4 business days after the clinic has received the results. If you do not hear from us within 7 days, please contact the clinic through [employer] or phone. If you have a critical or abnormal lab result, we will notify you by phone as soon as possible.  Submit refill requests through [email or web address] or call your pharmacy and they will forward the refill request to us. Please allow 3 business days for your refill to be completed.          Additional Information About Your Visit        MyChart Information     [email or web address] gives you secure access to your electronic health record. If you see a primary care provider, you can also send messages to your care team and make appointments. If you have questions, please call your primary care clinic.  If you do not have a primary care provider, please call [phone] and they will assist you.        Care EveryWhere ID     This is your Care EveryWhere ID. This could be used by other organizations to access your [place] medical records  [id number]        Your Vitals Were     Pulse Last Period BMI (Body Mass Index)             62 10/01/2018 (Exact Date) 26.4 kg/m2          Blood  Pressure from Last 3 Encounters:   10/05/18 124/76   09/20/18 (!) 145/94   03/08/18 119/80    Weight from Last 3 Encounters:   10/05/18 151 lb 6.4 oz (68.7 kg)   09/20/18 150 lb (68 kg)   03/08/18 140 lb (63.5 kg)              Today, you had the following     No orders found for display       Primary Care Provider Office Phone # Fax #    Sonia Rodriguez MD PhD [phone]       [address]  [facility] [address]        Equal Access to Services     [facility]: Hadii aad ku hadasho Soomaali, waaxda luqadaha, qaybta kaalmada adeegyada, shannan pinzon . So [facility] [phone].    ATENCIÓN: Si habla español, tiene a peralta disposición servicios gratuitos de asistencia lingüística. Llame al [phone].    We comply with applicable federal civil rights laws and [place] laws. We do not discriminate on the basis of race, color, national origin, age, disability, sex, sexual orientation, or gender identity.            Thank you!     Thank you for choosing [facility]  for your care. Our goal is always to provide you with excellent care. Hearing back from our patients is one way we can continue to improve our services. Please take a few minutes to complete the written survey that you may receive in the mail after your visit with us. Thank you!             Your Updated Medication List - Protect others around you: Learn how to safely use, store and throw away your medicines at www.disposemymeds.org.          This list is accurate as of 10/5/18  1:46 PM.  Always use your most recent med list.                   Brand Name Dispense Instructions for use Diagnosis    aspirin 81 MG chewable tablet     90 tablet    Take 1 tablet (81 mg) by mouth daily    S/P repair of coarctation of aorta, Coarctation of aorta       desonide 0.05 % cream    DESOWEN    30 g    Apply sparingly to affected area three times daily as needed for 14 days .    Rash       fluticasone 50 MCG/ACT spray     FLONASE    1 Bottle    Spray 1-2 sprays into both nostrils daily    Left serous otitis media, unspecified chronicity       metoprolol succinate 50 MG 24 hr tablet    TOPROL-XL    90 tablet    TAKE ONE TABLET BY MOUTH ONE TIME DAILY    Hypertension, goal below 140/90       simvastatin 20 MG tablet    ZOCOR    90 tablet    Take 1 tablet (20 mg) by mouth At Bedtime    S/P repair of coarctation of aorta, Coarctation of aorta

## 2018-10-05 NOTE — PROGRESS NOTES
OB/GYN New   10/5/2018      NAME:  Sindhu Bennett  PCP:  Sonia Rodriguez  MRN:  [id number]      Impression / Plan     43 year old  with:      ICD-10-CM    1. Pelvic pain in female R10.2    2. Hx of endometriosis Z87.42    3. Hx of ovarian cyst Z87.42        US images reviewed personally and with the patient. 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  The etiology of this syndrome is unclear. There is no definitive diagnostic criteria and treatment options are unclear. Treatment options include depo provera, nexplanon, and depo lupron.  She may also consider embolization or sclerotherapy.     Pain may be related to her history of endometriosis.  She has done well with dietary changes and exercise in the past.  She would like to try this first.  She is trying to avoid birth control for Jainism reasons but is open to this option.  We will avoid estrogen due to her medical history.  She has done well with the Mirena IUD in the past and may consider this in the future.  She will follow-up with me as needed.  Plan pelvic exam at next visit if symptoms do not improve.      Declines mammogram   Discussed ASCCP guidelines.  Patient has been negative for HPV so she should be able to follow the screening guidelines even though her sister passed from stage IV cervical cancer.  However I also understand if she would like more frequent screening.  She will let us know what she decides.      Chief Complaint     Chief Complaint   Patient presents with     Consult     Ultrasound - ovarian cyst       HPI     Sindhu Bennett is a  43 year old female who is seen for consultation.      Patient saw Sahara Howard PA-C 2018, at which time she complained of abdominal pain.  She had an ultrasound of the abdomen and pelvis on 2018.  Abdominal ultrasound was normal.  No reason for her abdominal pain was identified.    Ultrasound:    Uterus 8.9 x 5.3 x 6.6 cm    Endometrial  stripe 8 mm    Prominent blood vessels around the uterus and right adnexa, concerning for pelvic congestion syndrome.    Ovaries are normal bilaterally.  Benign-appearing cyst in the left ovary measuring 3.1 cm    Patient's last menstrual period was 10/01/2018 (exact date).  Periods every 27-30 days.  About 5 days.  One day is heavy.  More recently cycles are not as heavy however.      Patient's history is significant for laparoscopic ovarian cystectomy, 4 different surgeries.  That was done with Jose A in [facility][1].  This was more than 10 years ago.  She thinks both sides were effected.    She was told she had endometriosis at that time.  Sounds like she had a couple of shots, that helped (Possibly both lupron and provera injection).  She was on birth control but had to stop due to blood pressure. She had an IUD and that worked.  She had that removed when she was remarried and subsequently started a family.  She was not having any problems in the last for the last 5 years.     Her sister recently passed and that has been a huge stressor.     Pain started in August, prior to her sister passing.      The pain is sharp either on the right or the left.  It will stay for 1 to 10 minutes.  This can occur up to a couple times per day.  She can also go days without pain.  When the pain comes, she has to stop what she is doing because the pain is so bad. She will have to hunch over and let it pass.  She describes it as a major cramping.  Nothing seems to cause the pain.  She thinks this pain is like her endometriosis pain she used to have but may also be like the appendicitis pain.    In the past she feels the pain is better when she has a better diet (more fruits/veggies, less read meat).    Pain is not related to her cycle.  She is not sure if it is worse during ovulation, but now that she thinks back she may have more pain during ovulation.      with vasectomy.    Patient has a history of postpartum  preeclampisa    She is also had an appendectomy.    Date of Last Pap Smear:   Lab Results   Component Value Date    PAP NIL, hr HPV neg 02/08/2018     Previous abnormal pap smear: No    Mammogram:  She has not had a mammogram.  She is not interested in a mammogram today.    Family history of cervical cancer with her sister, stage IV, passed away within months in her 40s.    Problem List     Patient Active Problem List    Diagnosis Date Noted     Hypertension, goal below 140/90 04/04/2016     Priority: Medium     S/P repair of coarctation of aorta 01/05/2015     Priority: Medium     HSV infection 12/31/2014     Priority: Medium     Her  has had penile herpes ('s only sexual partner has been wife)  Sindhu has not had herpes.          Coarctation of aorta 09/25/2014     Priority: Medium     Overview:   5/01 s/p angioplasty and stenting       Essential hypertension 09/25/2014     Priority: Medium     CARDIOVASCULAR SCREENING; LDL GOAL LESS THAN 130 09/25/2014     Priority: Medium       Medications     Current Outpatient Prescriptions   Medication     aspirin 81 MG chewable tablet     metoprolol (TOPROL-XL) 50 MG 24 hr tablet     simvastatin (ZOCOR) 20 MG tablet     desonide (DESOWEN) 0.05 % cream     fluticasone (FLONASE) 50 MCG/ACT spray     No current facility-administered medications for this visit.         Allergies     Allergies   Allergen Reactions     Penicillins Rash     Was a very red facial rash     Erythromycin      Severe vomiting     Levaquin [Levofloxacin]      Nitroglycerin      Doxycycline Rash     Toradol [Ketorolac Tromethamine]        Past Medical/Surgical History     Past Medical History:   Diagnosis Date     Coarctation of aorta 2001     Endometriosis      History of pre-eclampsia      HTN (hypertension)      Other and unspecified ovarian cyst        Past Surgical History:   Procedure Laterality Date     ANGIOPLASTY       APPENDECTOMY  2006     BIOPSY  '96 & '98    when cyst were  removed     C LAP OVARIAN CYSTOTOMY      4 different surgeries        Social History     Social History     Social History     Marital status:      Spouse name: N/A     Number of children: 3     Years of education: N/A     Occupational History     home       Social History Main Topics     Smoking status: Never Smoker     Smokeless tobacco: Never Used     Alcohol use Yes      Comment: occ.     Drug use: No     Sexual activity: Yes     Partners: Male     Birth control/ protection: Abstinence, None     Other Topics Concern     Parent/Sibling W/ Cabg, Mi Or Angioplasty Before 65f 55m? Yes     uncle my moms brother     Caffeine Concern No     10 oz coffee a day     Exercise Yes     walking 2-3 miles a day     Social History Narrative       Family History      Family History   Problem Relation Age of Onset     Hypertension Mother      Diabetes Father      Hypertension Father      Cancer - colorectal Maternal Grandfather      C.A.D. Maternal Grandfather      Diabetes Maternal Grandfather      Hypertension Maternal Grandfather      C.A.D. Maternal Uncle      Cerebrovascular Disease Maternal Uncle      Hypertension Maternal Uncle      Diabetes Maternal Uncle      Diabetes Maternal Grandmother      Multiple Sclerosis Maternal Grandmother      Hypertension Brother      Colon Cancer Paternal Grandfather      Coronary Artery Disease Paternal Uncle      Cervical Cancer Sister      stage 4 and now  .      Asthma No family hx of      Breast Cancer No family hx of      Prostate Cancer No family hx of      Thyroid Disease No family hx of      Genetic Disorder No family hx of        ROS     A 6 organ review of systems was asked and the pertinent positives and negatives are listed in the HPI. All other organ systems can be considered negative.     Physical Exam   Vitals: /76 (BP Location: Right arm, Patient Position: Chair, Cuff Size: Adult Regular)  Pulse 62  Wt 151 lb 6.4 oz (68.7 kg)   LMP 10/01/2018 (Exact Date)  BMI 26.4 kg/m2    General: Comfortable, no obvious distress, normal  body habitus   Psych: Alert and orientated x 3. Appropriate affect, good insight.   : deferred    Labs/Imaging       Labs were reviewed in [facility]     Imaging was reviewed in Epic.       30 minutes was spent with patient, more than 50% counseling and coordinating care    Dianne Snell MD

## 2018-10-05 NOTE — NURSING NOTE
"Chief Complaint   Patient presents with     Consult     Ultrasound - ovarian cyst       Initial /76 (BP Location: Right arm, Patient Position: Chair, Cuff Size: Adult Regular)  Pulse 62  Wt 151 lb 6.4 oz (68.7 kg)  LMP 10/01/2018 (Exact Date)  BMI 26.4 kg/m2 Estimated body mass index is 26.4 kg/(m^2) as calculated from the following:    Height as of 18: 5' 3.5\" (1.613 m).    Weight as of this encounter: 151 lb 6.4 oz (68.7 kg).  BP completed using cuff size: regular        The following HM Due: mammogram      The following patient reported/Care Every where data was sent to:  P ABSTRACT QUALITY INITIATIVES [30032]       Pt declines to have Mammogram.       Martha Ruiz, [facility]  2018    "

## 2018-10-18 ENCOUNTER — TELEPHONE (OUTPATIENT)
Dept: FAMILY MEDICINE | Facility: CLINIC | Age: 43
End: 2018-10-18

## 2018-10-24 ENCOUNTER — OFFICE VISIT (OUTPATIENT)
Dept: FAMILY MEDICINE | Facility: CLINIC | Age: 43
End: 2018-10-24
Payer: MEDICAID

## 2018-10-24 VITALS
TEMPERATURE: 98.1 F | HEART RATE: 72 BPM | WEIGHT: 151 LBS | RESPIRATION RATE: 16 BRPM | SYSTOLIC BLOOD PRESSURE: 124 MMHG | DIASTOLIC BLOOD PRESSURE: 80 MMHG | OXYGEN SATURATION: 100 % | HEIGHT: 64 IN | BODY MASS INDEX: 25.78 KG/M2

## 2018-10-24 DIAGNOSIS — I10 HYPERTENSION, GOAL BELOW 140/90: Primary | ICD-10-CM

## 2018-10-24 DIAGNOSIS — J06.9 VIRAL URI WITH COUGH: ICD-10-CM

## 2018-10-24 DIAGNOSIS — R07.0 THROAT PAIN: ICD-10-CM

## 2018-10-24 LAB
DEPRECATED S PYO AG THROAT QL EIA: NORMAL
SPECIMEN SOURCE: NORMAL

## 2018-10-24 PROCEDURE — 87081 CULTURE SCREEN ONLY: CPT | Performed by: PHYSICIAN ASSISTANT

## 2018-10-24 PROCEDURE — 87880 STREP A ASSAY W/OPTIC: CPT | Performed by: PHYSICIAN ASSISTANT

## 2018-10-24 PROCEDURE — 99213 OFFICE O/P EST LOW 20 MIN: CPT | Performed by: PHYSICIAN ASSISTANT

## 2018-10-24 RX ORDER — METOPROLOL SUCCINATE 50 MG/1
50 TABLET, EXTENDED RELEASE ORAL DAILY
Qty: 90 TABLET | Refills: 1 | Status: SHIPPED | OUTPATIENT
Start: 2018-10-24 | End: 2019-04-25

## 2018-10-24 ASSESSMENT — PAIN SCALES - GENERAL: PAINLEVEL: MODERATE PAIN (5)

## 2018-10-24 NOTE — PATIENT INSTRUCTIONS
Continue symptomatic cares.  NON PRESCRIPTION TREATMENT    Mucinex 600 mg 2 tabs twice daily  Increase humidity to 30-40% in bedroom at night - vaporizer  Avoid decongestant  Saline nasal spray as needed  Increase fluid intake  Benadryl 25mg 1/2 - 1 hour before bed time  Maintain 8 hr minimum of sleep at night  Robitussin DM cough gels for cough    Follow up with us if no improvement over the next 5 days.  Return urgently if any change in symptoms.    Follow up with us in February (after 2/8/18)   for your annual exam and fasting labs.     At [facility], we strive to deliver an exceptional experience to you, every time we see you.  If you receive a survey in the mail, please send us back your thoughts. We really do value your feedback.    Your care team:     Family Medicine   KASHMIR Burdick MD Emily Bunt, APRN CNP S. MD Shoshana Griffith MD Angela Wermerskirchen, MD         Clinic hours: Monday - Wednesday 7 am-7 pm   Thursdays and Fridays 7 am-5 pm.     [facility]: Monday - Friday 11 am-9 pm,   Saturday and Sunday 9 am-5 pm.    [facility]: Monday -Thursday 8 am-8 pm; Friday 8 am-6 pm; Saturday and Sunday 9 am-5 pm.     [facility]: Monday - Thursday 8 am - 7 pm; Friday 8 am - 6 pm    Clinic: [phone]   [facility]: [phone]   [facility]: [phone]

## 2018-10-24 NOTE — PROGRESS NOTES
SUBJECTIVE:   Sindhu Bennett is a 43 year old female who presents to clinic today for the following health issues:    Hypertension Follow-up      Outpatient blood pressures are not being checked.    Low Salt Diet: no added salt      Amount of exercise or physical activity: 6-7 days/week     Problems taking medications regularly: No    Medication side effects: none    Diet: regular (no restrictions)      Acute Illness   Acute illness concerns: sore throat  Onset: Friday    Fever: YES    Chills/Sweats: YES    Headache (location?): YES    Sinus Pressure:YES    Conjunctivitis:  no    Ear Pain: no    Rhinorrhea: YES    Congestion: YES    Sore Throat: YES     Cough: YES-productive of green sputum    Wheeze: no    Decreased Appetite: YES    Nausea: no    Vomiting: no    Diarrhea:  no    Dysuria/Freq.: no    Fatigue/Achiness: YES    Sick/Strep Exposure: no     Therapies Tried and outcome: tylenol , Aspirin        No shortness of breath.  Did have some pain right lateral rib and heart beating fast but that has resolved.  Wasn't taking sudafed.  Did take allergy pill in am.   No pain anymore.   Fever up to 101 Saturday night. Fever has resolved completely.   Has body aches.   No longer has pelvic pain - did follow up with gyn- feels likely related to endometriosis      Problem list and histories reviewed & adjusted, as indicated.  Additional history: as documented    Patient Active Problem List   Diagnosis     Coarctation of aorta     Essential hypertension     CARDIOVASCULAR SCREENING; LDL GOAL LESS THAN 130     HSV infection     S/P repair of coarctation of aorta     Hypertension, goal below 140/90     Past Surgical History:   Procedure Laterality Date     ANGIOPLASTY       APPENDECTOMY  2006     BIOPSY  '96 & '98    when cyst were removed     C LAP OVARIAN CYSTOTOMY      4 different surgeries       Social History   Substance Use Topics     Smoking status: Never Smoker     Smokeless tobacco: Never Used      Alcohol use Yes      Comment: occ.     Family History   Problem Relation Age of Onset     Hypertension Mother      Diabetes Father      Hypertension Father      Cancer - colorectal Maternal Grandfather      C.A.D. Maternal Grandfather      Diabetes Maternal Grandfather      Hypertension Maternal Grandfather      C.A.D. Maternal Uncle      Cerebrovascular Disease Maternal Uncle      Hypertension Maternal Uncle      Diabetes Maternal Uncle      Diabetes Maternal Grandmother      Multiple Sclerosis Maternal Grandmother      Hypertension Brother      Colon Cancer Paternal Grandfather      Coronary Artery Disease Paternal Uncle      Cervical Cancer Sister      stage 4 and now  .      Asthma No family hx of      Breast Cancer No family hx of      Prostate Cancer No family hx of      Thyroid Disease No family hx of      Genetic Disorder No family hx of          Current Outpatient Prescriptions   Medication Sig Dispense Refill     aspirin 81 MG chewable tablet Take 1 tablet (81 mg) by mouth daily 90 tablet 3     metoprolol succinate (TOPROL-XL) 50 MG 24 hr tablet Take 1 tablet (50 mg) by mouth daily 90 tablet 1     simvastatin (ZOCOR) 20 MG tablet Take 1 tablet (20 mg) by mouth At Bedtime 90 tablet 3     [DISCONTINUED] metoprolol (TOPROL-XL) 50 MG 24 hr tablet TAKE ONE TABLET BY MOUTH ONE TIME DAILY  90 tablet 3     BP Readings from Last 3 Encounters:   10/24/18 124/80   10/05/18 124/76   18 (!) 145/94    Wt Readings from Last 3 Encounters:   10/24/18 68.5 kg (151 lb)   10/05/18 68.7 kg (151 lb 6.4 oz)   18 68 kg (150 lb)                    Reviewed and updated as needed this visit by clinical staff  Tobacco  Allergies  Meds  Med Hx  Surg Hx  Fam Hx  Soc Hx      Reviewed and updated as needed this visit by Provider  Tobacco  Allergies  Meds  Problems  Med Hx  Surg Hx  Fam Hx  Soc Hx          ROS:  Constitutional, HEENT, cardiovascular, pulmonary, gi and gu systems are negative, except as  "otherwise noted.    OBJECTIVE:     /80 (BP Location: Right arm, Patient Position: Chair, Cuff Size: Adult Regular)  Pulse 72  Temp 98.1  F (36.7  C) (Oral)  Resp 16  Ht 1.613 m (5' 3.5\")  Wt 68.5 kg (151 lb)  LMP 10/01/2018 (Exact Date)  SpO2 100%  Breastfeeding? No  BMI 26.33 kg/m2  Body mass index is 26.33 kg/(m^2).  GENERAL: healthy, alert and no distress  EYES: Eyes grossly normal to inspection, PERRL and conjunctivae and sclerae normal  HENT: ear canals and TM's normal, nose and mouth without ulcers or lesions  NECK: no adenopathy, no asymmetry, masses, or scars and thyroid normal to palpation  RESP: lungs clear to auscultation - no rales, rhonchi or wheezes  CV: regular rate and rhythm, normal S1 S2, no S3 or S4, no murmur, click or rub, no peripheral edema and peripheral pulses strong  ABDOMEN: soft, nontender, no hepatosplenomegaly, no masses and bowel sounds normal  MS: no gross musculoskeletal defects noted, no edema    Diagnostic Test Results:  none     ASSESSMENT/PLAN:         BMI:   Estimated body mass index is 26.33 kg/(m^2) as calculated from the following:    Height as of this encounter: 1.613 m (5' 3.5\").    Weight as of this encounter: 68.5 kg (151 lb).   Weight management plan: Discussed healthy diet and exercise guidelines and patient will follow up in 4  months  in clinic to re-evaluate.      1. Hypertension, goal below 140/90  Blood pressure at goal.  Continue current medication. Follow-up us in 4 months for annual exam and fasting labs   - metoprolol succinate (TOPROL-XL) 50 MG 24 hr tablet; Take 1 tablet (50 mg) by mouth daily  Dispense: 90 tablet; Refill: 1    2. Viral upper respiratory infection with cough  Recommended over the counter symptomatic cares.  Follow up with us as needed of if no improvement over the next 5 days    3.Throat pain  Strep test was negative.  Culture pending  - Strep, Rapid Screen  - Beta strep group A culture    Patient Instructions     Continue " symptomatic cares.  NON PRESCRIPTION TREATMENT    Mucinex 600 mg 2 tabs twice daily  Increase humidity to 30-40% in bedroom at night - vaporizer  Avoid decongestant  Saline nasal spray as needed  Increase fluid intake  Benadryl 25mg 1/2 - 1 hour before bed time  Maintain 8 hr minimum of sleep at night  Robitussin DM cough gels for cough    Follow up with us if no improvement over the next 5 days.  Return urgently if any change in symptoms.    Follow up with us in February (after 2/8/18)   for your annual exam and fasting labs.     At [facility], we strive to deliver an exceptional experience to you, every time we see you.  If you receive a survey in the mail, please send us back your thoughts. We really do value your feedback.    Your care team:     Family Medicine   KASHMIR Burdick MD Emily Bunt, APRN CNP S. MD Shoshana Griffith MD Angela Wermerskirchen, MD         Clinic hours: Monday - Wednesday 7 am-7 pm   Thursdays and Fridays 7 am-5 pm.     [facility]: Monday - Friday 11 am-9 pm,   Saturday and Sunday 9 am-5 pm.    [facility]: Monday -Thursday 8 am-8 pm; Friday 8 am-6 pm; Saturday and Sunday 9 am-5 pm.     [facility]: Monday - Thursday 8 am - 7 pm; Friday 8 am - 6 pm    Clinic: [phone]   [facility]: [phone]   [facility]: [phone]                 Sahara Howard PA-C  [facility]

## 2018-10-24 NOTE — MR AVS SNAPSHOT
After Visit Summary   10/24/2018    Sindhu Bennett    MRN: [id number]           Patient Information     Date Of Birth          1975        Visit Information        Provider Department      10/24/2018 3:20 PM Sahara Howard PA-C [facility]        Today's Diagnoses     Throat pain    -  1    Hypertension, goal below 140/90          Care Instructions    Continue symptomatic cares.  NON PRESCRIPTION TREATMENT    Mucinex 600 mg 2 tabs twice daily  Increase humidity to 30-40% in bedroom at night - vaporizer  Avoid decongestant  Saline nasal spray as needed  Increase fluid intake  Benadryl 25mg 1/2 - 1 hour before bed time  Maintain 8 hr minimum of sleep at night  Robitussin DM cough gels for cough    Follow up with us if no improvement over the next 5 days.  Return urgently if any change in symptoms.    Follow up with us in February (after 2/8/18)   for your annual exam and fasting labs.     At [facility], we strive to deliver an exceptional experience to you, every time we see you.  If you receive a survey in the mail, please send us back your thoughts. We really do value your feedback.    Your care team:     Family Medicine   KASHMIR Burdick MD Emily Bunt, TREVOR DUNAWAY   S. MD Shoshana Griffith MD Angela Wermerskirchen, MD         Clinic hours: Monday - Wednesday 7 am-7 pm   Thursdays and Fridays 7 am-5 pm.     [facility]: Monday - Friday 11 am-9 pm,   Saturday and Sunday 9 am-5 pm.    [facility]: Monday -Thursday 8 am-8 pm; Friday 8 am-6 pm; Saturday and Sunday 9 am-5 pm.     [facility]: Monday - Thursday 8 am - 7 pm; Friday 8 am - 6 pm    Clinic: [phone]   [facility]: [phone]   [facility]: [phone]                    Follow-ups after your visit        Follow-up notes from your care team     Return in about  "4 months (around 2/24/2019) for Physical Exam.      Who to contact     If you have questions or need follow up information about today's clinic visit or your schedule please contact [facility] directly at [phone].  Normal or non-critical lab and imaging results will be communicated to you by CookB[employer], letter or phone within 4 business days after the clinic has received the results. If you do not hear from us within 7 days, please contact the clinic through CookBritehart or phone. If you have a critical or abnormal lab result, we will notify you by phone as soon as possible.  Submit refill requests through Sensorist or call your pharmacy and they will forward the refill request to us. Please allow 3 business days for your refill to be completed.          Additional Information About Your Visit        [employer] Information     Sensorist gives you secure access to your electronic health record. If you see a primary care provider, you can also send messages to your care team and make appointments. If you have questions, please call your primary care clinic.  If you do not have a primary care provider, please call [phone] and they will assist you.        Care EveryWhere ID     This is your Care EveryWhere ID. This could be used by other organizations to access your [place] medical records  [id number]        Your Vitals Were     Pulse Temperature Respirations Height Last Period Pulse Oximetry    72 98.1  F (36.7  C) (Oral) 16 1.613 m (5' 3.5\") 10/01/2018 (Exact Date) 100%    Breastfeeding? BMI (Body Mass Index)                No 26.33 kg/m2           Blood Pressure from Last 3 Encounters:   10/24/18 124/80   10/05/18 124/76   09/20/18 (!) 145/94    Weight from Last 3 Encounters:   10/24/18 68.5 kg (151 lb)   10/05/18 68.7 kg (151 lb 6.4 oz)   09/20/18 68 kg (150 lb)              We Performed the Following     Beta strep group A culture     Strep, Rapid Screen          Today's Medication Changes        "   These changes are accurate as of 10/24/18  3:48 PM.  If you have any questions, ask your nurse or doctor.               These medicines have changed or have updated prescriptions.        Dose/Directions    metoprolol succinate 50 MG 24 hr tablet   Commonly known as:  TOPROL-XL   This may have changed:  See the new instructions.   Used for:  Hypertension, goal below 140/90   Changed by:  Sahara Howard PA-C        Dose:  50 mg   Take 1 tablet (50 mg) by mouth daily   Quantity:  90 tablet   Refills:  1            Where to get your medicines      These medications were sent to [facility] - [place], [place] - [address]  [address], [facility] [address]     Phone:  [phone]     metoprolol succinate 50 MG 24 hr tablet                Primary Care Provider Office Phone # Fax #    Sonia Rodriguez MD PhD [phone]       [address]  [facility] [address]        Equal Access to Services     [facility]: Hadii jess ku hadasho Soomaali, waaxda luqadaha, qaybta kaalmada adeegyada, waxay amandain haybrainn quentin pinzon . So [facility] [phone].    ATENCIÓN: Si habla español, tiene a peralta disposición servicios gratuitos de asistencia lingüística. [facility] [phone].    We comply with applicable federal civil rights laws and [place] laws. We do not discriminate on the basis of race, color, national origin, age, disability, sex, sexual orientation, or gender identity.            Thank you!     Thank you for choosing [facility]  for your care. Our goal is always to provide you with excellent care. Hearing back from our patients is one way we can continue to improve our services. Please take a few minutes to complete the written survey that you may receive in the mail after your visit with us. Thank you!             Your Updated Medication List - Protect others around you: Learn how to safely use, store and throw away your medicines at www.disposemymeds.org.           This list is accurate as of 10/24/18  3:48 PM.  Always use your most recent med list.                   Brand Name Dispense Instructions for use Diagnosis    aspirin 81 MG chewable tablet     90 tablet    Take 1 tablet (81 mg) by mouth daily    S/P repair of coarctation of aorta, Coarctation of aorta       metoprolol succinate 50 MG 24 hr tablet    TOPROL-XL    90 tablet    Take 1 tablet (50 mg) by mouth daily    Hypertension, goal below 140/90       simvastatin 20 MG tablet    ZOCOR    90 tablet    Take 1 tablet (20 mg) by mouth At Bedtime    S/P repair of coarctation of aorta, Coarctation of aorta

## 2018-10-25 LAB
BACTERIA SPEC CULT: NORMAL
SPECIMEN SOURCE: NORMAL

## 2018-11-29 ENCOUNTER — OFFICE VISIT (OUTPATIENT)
Dept: FAMILY MEDICINE | Facility: CLINIC | Age: 43
End: 2018-11-29
Payer: MEDICAID

## 2018-11-29 VITALS
WEIGHT: 152 LBS | DIASTOLIC BLOOD PRESSURE: 84 MMHG | SYSTOLIC BLOOD PRESSURE: 136 MMHG | BODY MASS INDEX: 25.95 KG/M2 | TEMPERATURE: 98 F | HEIGHT: 64 IN | RESPIRATION RATE: 17 BRPM | HEART RATE: 78 BPM | OXYGEN SATURATION: 99 %

## 2018-11-29 DIAGNOSIS — H60.392 INFECTIVE OTITIS EXTERNA, LEFT: Primary | ICD-10-CM

## 2018-11-29 DIAGNOSIS — H65.92 OME (OTITIS MEDIA WITH EFFUSION), LEFT: ICD-10-CM

## 2018-11-29 PROCEDURE — 99213 OFFICE O/P EST LOW 20 MIN: CPT | Performed by: PHYSICIAN ASSISTANT

## 2018-11-29 RX ORDER — AZITHROMYCIN 250 MG/1
TABLET, FILM COATED ORAL
Qty: 6 TABLET | Refills: 0 | Status: SHIPPED | OUTPATIENT
Start: 2018-11-29 | End: 2018-12-06

## 2018-11-29 RX ORDER — NEOMYCIN SULFATE, POLYMYXIN B SULFATE AND HYDROCORTISONE 10; 3.5; 1 MG/ML; MG/ML; [USP'U]/ML
4 SUSPENSION/ DROPS AURICULAR (OTIC) 4 TIMES DAILY
Qty: 6 ML | Refills: 0 | Status: SHIPPED | OUTPATIENT
Start: 2018-11-29 | End: 2018-12-06

## 2018-11-29 ASSESSMENT — PAIN SCALES - GENERAL: PAINLEVEL: EXTREME PAIN (8)

## 2018-11-29 NOTE — PATIENT INSTRUCTIONS
Take zithromax daily for 5 days. Use cortisporin ear drops for 7 days. Follow up with us if no improvement over the next 5 days.  Return urgently if any change in symptoms like increasing pain, fever ,drainage or other change in symptoms.     At [facility], we strive to deliver an exceptional experience to you, every time we see you.  If you receive a survey in the mail, please send us back your thoughts. We really do value your feedback.    Your care team:     Family Medicine   KASHMIR Burdick MD Emily Bunt, TREVOR DUNAWAY   S. MD Shoshana Griffith MD Angela Wermerskirchen, MD         Clinic hours: Monday - Wednesday 7 am-7 pm   Thursdays and Fridays 7 am-5 pm.     [facility]: Monday - Friday 11 am-9 pm,   Saturday and Sunday 9 am-5 pm.    [facility]: Monday -Thursday 8 am-8 pm; Friday 8 am-6 pm; Saturday and Sunday 9 am-5 pm.     [facility]: Monday - Thursday 8 am - 7 pm; Friday 8 am - 6 pm    Clinic: [phone]   [facility]: [phone]   [facility]: [phone]

## 2018-11-29 NOTE — PROGRESS NOTES
SUBJECTIVE:   Sindhu Bennett is a 43 year old female who presents to clinic today for the following health issues:    Acute Illness   Acute illness concerns: ear  Onset: couple weeks, ear worsened Monday    Fever: YES- but has resolved    Chills/Sweats: YES    Headache (location?): YES    Sinus Pressure:YES    Conjunctivitis:  no    Ear Pain: YES: left    Rhinorrhea: YES    Congestion: YES    Sore Throat: yes but has resolved     Cough: YES-has resolved    Wheeze: no    Decreased Appetite: no    Nausea: no    Vomiting: no    Diarrhea:  YES    Dysuria/Freq.: no    Fatigue/Achiness: YES    Sick/Strep Exposure: son is sick     Therapies Tried and outcome: nasal spray, ear drops        Left ear pain for 4 days. Son had a cold as well improving.  Nebulizer helped him  No swimming but does take a lot of baths.  No drainage from the ear      Problem list and histories reviewed & adjusted, as indicated.  Additional history: as documented    Patient Active Problem List   Diagnosis     Coarctation of aorta     Essential hypertension     CARDIOVASCULAR SCREENING; LDL GOAL LESS THAN 130     HSV infection     S/P repair of coarctation of aorta     Hypertension, goal below 140/90     Past Surgical History:   Procedure Laterality Date     ANGIOPLASTY       APPENDECTOMY  2006     BIOPSY  '96 & '98    when cyst were removed     C LAP OVARIAN CYSTOTOMY      4 different surgeries       Social History   Substance Use Topics     Smoking status: Never Smoker     Smokeless tobacco: Never Used     Alcohol use Yes      Comment: occ.     Family History   Problem Relation Age of Onset     Hypertension Mother      Diabetes Father      Hypertension Father      Cancer - colorectal Maternal Grandfather      C.A.D. Maternal Grandfather      Diabetes Maternal Grandfather      Hypertension Maternal Grandfather      C.A.D. Maternal Uncle      Cerebrovascular Disease Maternal Uncle      Hypertension Maternal Uncle      Diabetes Maternal  Uncle      Diabetes Maternal Grandmother      Multiple Sclerosis Maternal Grandmother      Hypertension Brother      Colon Cancer Paternal Grandfather      Coronary Artery Disease Paternal Uncle      Cervical Cancer Sister      stage 4 and now  .      Asthma No family hx of      Breast Cancer No family hx of      Prostate Cancer No family hx of      Thyroid Disease No family hx of      Genetic Disorder No family hx of          Current Outpatient Prescriptions   Medication Sig Dispense Refill     aspirin 81 MG chewable tablet Take 1 tablet (81 mg) by mouth daily 90 tablet 3     azithromycin (ZITHROMAX) 250 MG tablet Two tablets first day, then one tablet daily for four days. 6 tablet 0     fluticasone (FLONASE) 50 MCG/ACT spray Spray 2 sprays into both nostrils daily 16 g 1     metoprolol succinate (TOPROL-XL) 50 MG 24 hr tablet Take 1 tablet (50 mg) by mouth daily 90 tablet 1     neomycin-polymyxin-hydrocortisone (CORTISPORIN) 3.5-33133-8 otic suspension Place 4 drops Into the left ear 4 times daily for 7 days 6 mL 0     simvastatin (ZOCOR) 20 MG tablet Take 1 tablet (20 mg) by mouth At Bedtime 90 tablet 3     Recent Labs   Lab Test  18   0739  10/13/17   1055  10/12/17   1600  17   0747  10/11/16   0545   16   1616  09/28/15   0831 07/07/15   12/04/14   1253   LDL  48   --    --   66  97   < >   --    --    --    --    --    HDL  51   --    --   52  43*   < >   --    --    --    --    --    TRIG  85   --    --   87  192*   < >   --    --    --    --    --    ALT   --    --   23   --    --    --    --    --   37   --   22   CR  0.77  0.80  0.80  0.80   --    < >  0.64  0.77  0.53   --   0.58   GFRESTIMATED  83  80  79  80   --    < >  >90  Non  GFR Calc    83  >60   --   >90  Non  GFR Calc     GFRESTBLACK  >90  >90  >90  >90  African American GFR Calc     --    < >  >90   GFR Calc    >90   GFR Calc    >60   --   >90    "American GFR Calc     POTASSIUM  4.6  4.0  4.2  3.9   --    < >  3.8  4.3   --    < >  3.8   TSH   --    --    --    --    --    --   2.29  1.40   --    --    --     < > = values in this interval not displayed.      BP Readings from Last 3 Encounters:   11/30/18 118/78   11/29/18 136/84   10/24/18 124/80    Wt Readings from Last 3 Encounters:   11/30/18 69.9 kg (154 lb)   11/29/18 68.9 kg (152 lb)   10/24/18 68.5 kg (151 lb)                    Reviewed and updated as needed this visit by clinical staff  Tobacco  Allergies  Meds  Problems  Med Hx  Surg Hx  Fam Hx  Soc Hx        Reviewed and updated as needed this visit by Provider  Tobacco  Allergies  Meds  Problems  Med Hx  Surg Hx  Fam Hx  Soc Hx          ROS:  Constitutional, HEENT, cardiovascular, pulmonary, gi and gu systems are negative, except as otherwise noted.    OBJECTIVE:     /84 (BP Location: Right arm, Patient Position: Chair, Cuff Size: Adult Regular)  Pulse 78  Temp 98  F (36.7  C) (Oral)  Resp 17  Ht 1.613 m (5' 3.5\")  Wt 68.9 kg (152 lb)  LMP 11/24/2018 (Exact Date)  SpO2 99%  Breastfeeding? No  BMI 26.5 kg/m2  Body mass index is 26.5 kg/(m^2).  GENERAL: healthy, alert and no distress  EYES: PERRL, EOMI and conjunctivae and sclerae normal  HENT: right ear: normal: no effusions, no erythema, normal landmarks, left ear: erythematous and red and boggy canal, nose and mouth without ulcers or lesions, oropharynx clear, oral mucous membranes moist and sinuses: maxillary, frontal tenderness on bilaterally  NECK: no adenopathy, no asymmetry, masses, or scars and thyroid normal to palpation  RESP: lungs clear to auscultation - no rales, rhonchi or wheezes  CV: regular rate and rhythm, normal S1 S2, no S3 or S4, no murmur, click or rub, no peripheral edema and peripheral pulses strong  MS: no gross musculoskeletal defects noted, no edema    Diagnostic Test Results:  none     ASSESSMENT/PLAN:             1. Infective otitis " externa, left  Will treat with cortisporin otic drops.  Warning signs and symptoms warranting urgent evaluation reviewed.   - neomycin-polymyxin-hydrocortisone (CORTISPORIN) 3.5-73470-0 otic suspension; Place 4 drops Into the left ear 4 times daily for 7 days  Dispense: 6 mL; Refill: 0    2. OME (otitis media with effusion), left  Allergy to penicillin.  Erythromycin with GI symptoms but has tolerated zithromax   - azithromycin (ZITHROMAX) 250 MG tablet; Two tablets first day, then one tablet daily for four days.  Dispense: 6 tablet; Refill: 0    Patient Instructions     Take zithromax daily for 5 days. Use cortisporin ear drops for 7 days. Follow up with us if no improvement over the next 5 days.  Return urgently if any change in symptoms like increasing pain, fever ,drainage or other change in symptoms.     At [facility], we strive to deliver an exceptional experience to you, every time we see you.  If you receive a survey in the mail, please send us back your thoughts. We really do value your feedback.    Your care team:     Family Medicine   KASHMIR Burdick MD Emily Bunt, APRN CNP   S. MD Shoshana Griffith MD Angela Wermerskirchen, MD         Clinic hours: Monday - Wednesday 7 am-7 pm   Thursdays and Fridays 7 am-5 pm.     [facility]: Monday - Friday 11 am-9 pm,   Saturday and Sunday 9 am-5 pm.    [facility]: Monday -Thursday 8 am-8 pm; Friday 8 am-6 pm; Saturday and Sunday 9 am-5 pm.     [facility]: Monday - Thursday 8 am - 7 pm; Friday 8 am - 6 pm    Clinic: [phone]   [facility]: [phone]   [facility]: [phone]                 Sahara Howard PA-C  [facility]

## 2018-11-29 NOTE — MR AVS SNAPSHOT
After Visit Summary   11/29/2018    Sindhu Bennett    MRN: [id number]           Patient Information     Date Of Birth          1975        Visit Information        Provider Department      11/29/2018 3:40 PM Sahara Howard PA-C [facility]        Today's Diagnoses     Infective otitis externa, left    -  1    OME (otitis media with effusion), left          Care Instructions    Take zithromax daily for 5 days. Use cortisporin ear drops for 7 days. Follow up with us if no improvement over the next 5 days.  Return urgently if any change in symptoms like increasing pain, fever ,drainage or other change in symptoms.     At [facility], we strive to deliver an exceptional experience to you, every time we see you.  If you receive a survey in the mail, please send us back your thoughts. We really do value your feedback.    Your care team:     Family Medicine   KASHMIR Burdick MD Emily Bunt, TREVOR DUNAWAY   S. MD Shoshana Griffith MD Angela Wermerskirchen, MD         Clinic hours: Monday - Wednesday 7 am-7 pm   Thursdays and Fridays 7 am-5 pm.     [facility]: Monday - Friday 11 am-9 pm,   Saturday and Sunday 9 am-5 pm.    [facility]: Monday -Thursday 8 am-8 pm; Friday 8 am-6 pm; Saturday and Sunday 9 am-5 pm.     [facility]: Monday - Thursday 8 am - 7 pm; Friday 8 am - 6 pm    Clinic: [phone]   [facility]: [phone]   [facility]: [phone]                    Follow-ups after your visit        Follow-up notes from your care team     Return in about 5 days (around 12/4/2018), or if symptoms worsen or fail to improve.      Who to contact     If you have questions or need follow up information about today's clinic visit or your schedule please contact [facility] directly at [phone].  Normal or  "non-critical lab and imaging results will be communicated to you by MyChart, letter or phone within 4 business days after the clinic has received the results. If you do not hear from us within 7 days, please contact the clinic through [employer]Box or phone. If you have a critical or abnormal lab result, we will notify you by phone as soon as possible.  Submit refill requests through [employer]Box or call your pharmacy and they will forward the refill request to us. Please allow 3 business days for your refill to be completed.          Additional Information About Your Visit        [employer] Information     [employer] gives you secure access to your electronic health record. If you see a primary care provider, you can also send messages to your care team and make appointments. If you have questions, please call your primary care clinic.  If you do not have a primary care provider, please call [phone] and they will assist you.        Care EveryWhere ID     This is your Care EveryWhere ID. This could be used by other organizations to access your [place] medical records  [id number]        Your Vitals Were     Pulse Temperature Respirations Height Last Period Pulse Oximetry    78 98  F (36.7  C) (Oral) 17 1.613 m (5' 3.5\") 11/24/2018 (Exact Date) 99%    Breastfeeding? BMI (Body Mass Index)                No 26.5 kg/m2           Blood Pressure from Last 3 Encounters:   11/29/18 136/84   10/24/18 124/80   10/05/18 124/76    Weight from Last 3 Encounters:   11/29/18 68.9 kg (152 lb)   10/24/18 68.5 kg (151 lb)   10/05/18 68.7 kg (151 lb 6.4 oz)              Today, you had the following     No orders found for display         Today's Medication Changes          These changes are accurate as of 11/29/18  3:59 PM.  If you have any questions, ask your nurse or doctor.               Start taking these medicines.        Dose/Directions    azithromycin 250 MG tablet   Commonly known as:  ZITHROMAX   Used for:  OME (otitis media with " effusion), left   Started by:  Sahara Howard PA-C        Two tablets first day, then one tablet daily for four days.   Quantity:  6 tablet   Refills:  0       neomycin-polymyxin-hydrocortisone 3.5-36997-9 otic suspension   Commonly known as:  CORTISPORIN   Used for:  Infective otitis externa, left   Started by:  Sahara Howard PA-C        Dose:  4 drop   Place 4 drops Into the left ear 4 times daily for 7 days   Quantity:  6 mL   Refills:  0            Where to get your medicines      These medications were sent to [facility] - [place], [place] - [address]  [address] [address]     Phone:  [phone]     azithromycin 250 MG tablet    neomycin-polymyxin-hydrocortisone 3.5-10846-3 otic suspension                Primary Care Provider Office Phone # Fax #    Sonia Rodriguez MD PhD [phone]       [address]  [facility] [address]        Equal Access to Services     [facility]: Hadii jess ku hadasho Soomaali, waaxda luqadaha, qaybta kaalmada adeegyada, waxay amandain haybrainn quentin pinzon . So [facility] [phone].    ATENCIÓN: Si rachael ellington, tiene a peralta disposición servicios gratuitos de asistencia lingüística. [facility] [phone].    We comply with applicable federal civil rights laws and [place] laws. We do not discriminate on the basis of race, color, national origin, age, disability, sex, sexual orientation, or gender identity.            Thank you!     Thank you for choosing [facility]  for your care. Our goal is always to provide you with excellent care. Hearing back from our patients is one way we can continue to improve our services. Please take a few minutes to complete the written survey that you may receive in the mail after your visit with us. Thank you!             Your Updated Medication List - Protect others around you: Learn how to safely use, store and throw away your medicines at www.disposemymeds.org.           This list is accurate as of 11/29/18  3:59 PM.  Always use your most recent med list.                   Brand Name Dispense Instructions for use Diagnosis    aspirin 81 MG chewable tablet    ASA    90 tablet    Take 1 tablet (81 mg) by mouth daily    S/P repair of coarctation of aorta, Coarctation of aorta       azithromycin 250 MG tablet    ZITHROMAX    6 tablet    Two tablets first day, then one tablet daily for four days.    OME (otitis media with effusion), left       fluticasone 50 MCG/ACT nasal spray    FLONASE    16 g    Spray 2 sprays into both nostrils daily    Acute maxillary sinusitis, recurrence not specified       metoprolol succinate ER 50 MG 24 hr tablet    TOPROL-XL    90 tablet    Take 1 tablet (50 mg) by mouth daily    Hypertension, goal below 140/90       neomycin-polymyxin-hydrocortisone 3.5-82772-2 otic suspension    CORTISPORIN    6 mL    Place 4 drops Into the left ear 4 times daily for 7 days    Infective otitis externa, left       simvastatin 20 MG tablet    ZOCOR    90 tablet    Take 1 tablet (20 mg) by mouth At Bedtime    S/P repair of coarctation of aorta, Coarctation of aorta

## 2018-11-30 ENCOUNTER — OFFICE VISIT (OUTPATIENT)
Dept: FAMILY MEDICINE | Facility: CLINIC | Age: 43
End: 2018-11-30
Payer: MEDICAID

## 2018-11-30 VITALS
WEIGHT: 154 LBS | HEART RATE: 80 BPM | OXYGEN SATURATION: 100 % | HEIGHT: 64 IN | SYSTOLIC BLOOD PRESSURE: 118 MMHG | DIASTOLIC BLOOD PRESSURE: 78 MMHG | TEMPERATURE: 98.6 F | BODY MASS INDEX: 26.29 KG/M2

## 2018-11-30 DIAGNOSIS — H60.392 INFECTIVE OTITIS EXTERNA, LEFT: Primary | ICD-10-CM

## 2018-11-30 PROCEDURE — 99213 OFFICE O/P EST LOW 20 MIN: CPT | Performed by: PHYSICIAN ASSISTANT

## 2018-11-30 NOTE — MR AVS SNAPSHOT
After Visit Summary   11/30/2018    Sindhu Bennett    MRN: [id number]           Patient Information     Date Of Birth          1975        Visit Information        Provider Department      11/30/2018 2:20 PM Sahara Howard PA-C [facility]        Today's Diagnoses     Infective otitis externa, left    -  1      Care Instructions    Take 3 over the counter ibuprofen four times a day with food as needed for pain.   Continue zithromax and ear drops  Follow up with ENT at [facility] ([phone]) or [facility] or [facility] early next week.   Return urgently if any change in symptoms           Follow-ups after your visit        Additional Services     OTOLARYNGOLOGY REFERRAL       Your provider has referred you to: [facility]: [facility] [phone]   [email or web address]  FMG: [facility] [phone]   [email or web address]  [place]: [facility] [phone]   [email or web address]    Please be aware that coverage of these services is subject to the terms and limitations of your health insurance plan.  Call member services at your health plan with any benefit or coverage questions.      Please bring the following with you to your appointment:    (1) Any X-Rays, CTs or MRIs which have been performed.  Contact the facility where they were done to arrange for  prior to your scheduled appointment.   (2) List of current medications  (3) This referral request   (4) Any documents/labs given to you for this referral                  Follow-up notes from your care team     Return in about 4 days (around 12/4/2018), or if symptoms worsen or fail to improve.      Your next 10 appointments already scheduled     Nov 30, 2018  2:20 PM CST   SHORT with  "Sahara Howard PA-C   [facility]    [address] [address] [phone]              Who to contact     If you have questions or need follow up information about today's clinic visit or your schedule please contact [facility] directly at [phone].  Normal or non-critical lab and imaging results will be communicated to you by MyChart, letter or phone within 4 business days after the clinic has received the results. If you do not hear from us within 7 days, please contact the clinic through [employer] or phone. If you have a critical or abnormal lab result, we will notify you by phone as soon as possible.  Submit refill requests through [employer] or call your pharmacy and they will forward the refill request to us. Please allow 3 business days for your refill to be completed.          Additional Information About Your Visit        [employer] Information     [employer] gives you secure access to your electronic health record. If you see a primary care provider, you can also send messages to your care team and make appointments. If you have questions, please call your primary care clinic.  If you do not have a primary care provider, please call [phone] and they will assist you.        Care EveryWhere ID     This is your Care EveryWhere ID. This could be used by other organizations to access your [place] medical records  [id number]        Your Vitals Were     Pulse Temperature Height Last Period Pulse Oximetry BMI (Body Mass Index)    80 98.6  F (37  C) (Oral) 1.613 m (5' 3.5\") 11/24/2018 (Exact Date) 100% 26.85 kg/m2       Blood Pressure from Last 3 Encounters:   11/30/18 118/78   11/29/18 136/84   10/24/18 124/80    Weight from Last 3 Encounters:   11/30/18 69.9 kg (154 lb)   11/29/18 68.9 kg (152 lb)   10/24/18 68.5 kg (151 lb)              We Performed the Following     OTOLARYNGOLOGY REFERRAL        Primary Care " Provider Office Phone # Fax #    Sonia Rodriguez MD PhD [phone]       [address]  [facility] [address]        Equal Access to Services     MALLIKA JEROME : Silvio jess monahan dwayne Ambriz, wastacyda luqfrank, qaybta kaalmada simon, shannan holman anirudhpaige salazar jeromy rapp. So [facility] [phone].    ATENCIÓN: Si habla español, tiene a peralta disposición servicios gratuitos de asistencia lingüística. Llame al [phone].    We comply with applicable federal civil rights laws and [place] laws. We do not discriminate on the basis of race, color, national origin, age, disability, sex, sexual orientation, or gender identity.            Thank you!     Thank you for choosing [facility]  for your care. Our goal is always to provide you with excellent care. Hearing back from our patients is one way we can continue to improve our services. Please take a few minutes to complete the written survey that you may receive in the mail after your visit with us. Thank you!             Your Updated Medication List - Protect others around you: Learn how to safely use, store and throw away your medicines at www.disposemymeds.org.          This list is accurate as of 11/30/18  2:09 PM.  Always use your most recent med list.                   Brand Name Dispense Instructions for use Diagnosis    aspirin 81 MG chewable tablet    ASA    90 tablet    Take 1 tablet (81 mg) by mouth daily    S/P repair of coarctation of aorta, Coarctation of aorta       azithromycin 250 MG tablet    ZITHROMAX    6 tablet    Two tablets first day, then one tablet daily for four days.    OME (otitis media with effusion), left       fluticasone 50 MCG/ACT nasal spray    FLONASE    16 g    Spray 2 sprays into both nostrils daily    Acute maxillary sinusitis, recurrence not specified       metoprolol succinate ER 50 MG 24 hr tablet    TOPROL-XL    90 tablet    Take 1 tablet (50 mg) by mouth daily    Hypertension, goal below 140/90        neomycin-polymyxin-hydrocortisone 3.5-40698-1 otic suspension    CORTISPORIN    6 mL    Place 4 drops Into the left ear 4 times daily for 7 days    Infective otitis externa, left       simvastatin 20 MG tablet    ZOCOR    90 tablet    Take 1 tablet (20 mg) by mouth At Bedtime    S/P repair of coarctation of aorta, Coarctation of aorta

## 2018-11-30 NOTE — PATIENT INSTRUCTIONS
Take 3 over the counter ibuprofen four times a day with food as needed for pain.   Continue zithromax and ear drops  Follow up with ENT at [facility] ([phone]) or [facility] early next week.   Return urgently if any change in symptoms

## 2018-11-30 NOTE — PROGRESS NOTES
SUBJECTIVE:   Sindhu Bennett is a 43 year old female who presents to clinic today for the following health issues:      Concern - Ear pain follow up   Onset: couple weeks     Description:   Worsened, not getting any better. Left ear pain.     Intensity: severe pain     Progression of Symptoms:  worsening    Accompanying Signs & Symptoms:  Headache    Therapies Tried and outcome: Neomycin ear drops and Zpak - not resolving the issue per patient     Just seen by myself yesterday for otitis externa and sinusitis.  Has had ear drops for otitis externa 6 times since yesterday  Has Not taking anything for pian.  Last time had this not this painful.    Rates pain a 10 right now.  No drainage from ear.  No fever, sweats, chills.     Problem list and histories reviewed & adjusted, as indicated.  Additional history: as documented    Patient Active Problem List   Diagnosis     Coarctation of aorta     Essential hypertension     CARDIOVASCULAR SCREENING; LDL GOAL LESS THAN 130     HSV infection     S/P repair of coarctation of aorta     Hypertension, goal below 140/90     Past Surgical History:   Procedure Laterality Date     ANGIOPLASTY       APPENDECTOMY  2006     BIOPSY  '96 & '98    when cyst were removed     C LAP OVARIAN CYSTOTOMY      4 different surgeries       Social History   Substance Use Topics     Smoking status: Never Smoker     Smokeless tobacco: Never Used     Alcohol use Yes      Comment: occ.     Family History   Problem Relation Age of Onset     Hypertension Mother      Diabetes Father      Hypertension Father      Cancer - colorectal Maternal Grandfather      C.A.D. Maternal Grandfather      Diabetes Maternal Grandfather      Hypertension Maternal Grandfather      C.A.D. Maternal Uncle      Cerebrovascular Disease Maternal Uncle      Hypertension Maternal Uncle      Diabetes Maternal Uncle      Diabetes Maternal Grandmother      Multiple Sclerosis Maternal Grandmother      Hypertension Brother   "    Colon Cancer Paternal Grandfather      Coronary Artery Disease Paternal Uncle      Cervical Cancer Sister      stage 4 and now  .      Asthma No family hx of      Breast Cancer No family hx of      Prostate Cancer No family hx of      Thyroid Disease No family hx of      Genetic Disorder No family hx of          Current Outpatient Prescriptions   Medication Sig Dispense Refill     aspirin 81 MG chewable tablet Take 1 tablet (81 mg) by mouth daily 90 tablet 3     azithromycin (ZITHROMAX) 250 MG tablet Two tablets first day, then one tablet daily for four days. 6 tablet 0     fluticasone (FLONASE) 50 MCG/ACT spray Spray 2 sprays into both nostrils daily 16 g 1     metoprolol succinate (TOPROL-XL) 50 MG 24 hr tablet Take 1 tablet (50 mg) by mouth daily 90 tablet 1     neomycin-polymyxin-hydrocortisone (CORTISPORIN) 3.5-50676-2 otic suspension Place 4 drops Into the left ear 4 times daily for 7 days 6 mL 0     simvastatin (ZOCOR) 20 MG tablet Take 1 tablet (20 mg) by mouth At Bedtime 90 tablet 3     BP Readings from Last 3 Encounters:   18 118/78   18 136/84   10/24/18 124/80    Wt Readings from Last 3 Encounters:   18 69.9 kg (154 lb)   18 68.9 kg (152 lb)   10/24/18 68.5 kg (151 lb)                    Reviewed and updated as needed this visit by clinical staff  Tobacco  Allergies  Meds  Med Hx  Surg Hx  Fam Hx  Soc Hx      Reviewed and updated as needed this visit by Provider  Tobacco  Allergies  Meds  Problems  Med Hx  Surg Hx  Fam Hx  Soc Hx          ROS:  Constitutional, HEENT, cardiovascular, pulmonary, gi and gu systems are negative, except as otherwise noted.    OBJECTIVE:     /78 (BP Location: Right arm, Patient Position: Sitting, Cuff Size: Adult Regular)  Pulse 80  Temp 98.6  F (37  C) (Oral)  Ht 1.613 m (5' 3.5\")  Wt 69.9 kg (154 lb)  LMP 2018 (Exact Date)  SpO2 100%  BMI 26.85 kg/m2  Body mass index is 26.85 kg/(m^2).  GENERAL: healthy, " alert and no distress  EYES: Eyes grossly normal to inspection, PERRL and conjunctivae and sclerae normal  HENT: normal cephalic/atraumatic, right ear: normal: no effusions, no erythema, normal landmarks, left ear: normal tympanic membrane  and red and boggy canal, nose and mouth without ulcers or lesions, oropharynx clear, oral mucous membranes moist and sinuses: maxillary, frontal tenderness on bilaterally  Left ear pain with manipulation of tragus and pinna   NECK: no adenopathy, no asymmetry, masses, or scars and thyroid normal to palpation  RESP: lungs clear to auscultation - no rales, rhonchi or wheezes  CV: regular rate and rhythm, normal S1 S2, no S3 or S4, no murmur, click or rub, no peripheral edema and peripheral pulses strong    Diagnostic Test Results:  none     ASSESSMENT/PLAN:             1. Infective otitis externa, left  Patient reports that she developed an allergy to levaquin with a rash she believes.  Advised only drops available are current drops and fluroquinolone ear drop- it has been less than  24 since we started drops and zithromax.  I do not think it has been enough time to see a response but we could certainly switch ear drop but given her allergy history did not think this was wise.  Follow up with ENT early next week if symptoms not improving.  Return urgently if any change in symptoms.  Emergency department over the weekend if any change in symptoms   - OTOLARYNGOLOGY REFERRAL    Patient Instructions   Take 3 over the counter ibuprofen four times a day with food as needed for pain.   Continue zithromax and ear drops  Follow up with ENT at [facility] ([phone]) or [facility] early next week.   Return urgently if any change in symptoms      CC chart to PCP for micheline Howard PA-C  [facility]

## 2018-12-06 ENCOUNTER — OFFICE VISIT (OUTPATIENT)
Dept: PEDIATRICS | Facility: CLINIC | Age: 43
End: 2018-12-06
Payer: MEDICAID

## 2018-12-06 VITALS
WEIGHT: 150.8 LBS | SYSTOLIC BLOOD PRESSURE: 143 MMHG | BODY MASS INDEX: 25.74 KG/M2 | DIASTOLIC BLOOD PRESSURE: 93 MMHG | OXYGEN SATURATION: 98 % | HEART RATE: 80 BPM | TEMPERATURE: 98.9 F | HEIGHT: 64 IN

## 2018-12-06 DIAGNOSIS — B30.9 VIRAL CONJUNCTIVITIS OF LEFT EYE: Primary | ICD-10-CM

## 2018-12-06 PROCEDURE — 99213 OFFICE O/P EST LOW 20 MIN: CPT | Performed by: INTERNAL MEDICINE

## 2018-12-06 NOTE — PROGRESS NOTES
SUBJECTIVE:   Sindhu Bennett is a 43 year old female who presents to clinic today for the following health issues:      Eye(s) Problem/Sinus congestion/Seen on 11/30/18 for left ear infection  Onset: Sinus cold 3 weeks,  Pink eye started yesterday    Description:   Location: left  Pain: no   Redness: YES    Accompanying Signs & Symptoms:  Discharge/mattering: YES- small matter  Swelling: YES  Visual changes: no   Fever: no   Nasal Congestion: YES  Bothered by bright lights: no     History:   Trauma: no   Foreign body exposure: no     Precipitating factors:   Wearing contacts: no     Alleviating factors:  Improved by: No    Therapies Tried and outcome: Finished Zpack and that didn't help.  Used a anthony pot and that cleared up her sinuses and her left ear feels better.    HPI  Patient came in complaining of left eye pain pain and will mattery today.  It itches.  She denies having foreign body in the eye.  No trauma.    Problem list and histories reviewed & adjusted, as indicated.  Additional history: as documented    Current Outpatient Prescriptions   Medication Sig Dispense Refill     aspirin 81 MG chewable tablet Take 1 tablet (81 mg) by mouth daily 90 tablet 3     fluticasone (FLONASE) 50 MCG/ACT spray Spray 2 sprays into both nostrils daily 16 g 1     metoprolol succinate (TOPROL-XL) 50 MG 24 hr tablet Take 1 tablet (50 mg) by mouth daily 90 tablet 1     simvastatin (ZOCOR) 20 MG tablet Take 1 tablet (20 mg) by mouth At Bedtime 90 tablet 3     Allergies   Allergen Reactions     Penicillins Rash     Was a very red facial rash     Erythromycin      Severe vomiting     Levaquin [Levofloxacin]      Nitroglycerin      Doxycycline Rash     Toradol [Ketorolac Tromethamine]        Reviewed and updated as needed this visit by clinical staff  Tobacco  Allergies  Meds  Med Hx  Surg Hx  Fam Hx  Soc Hx      Reviewed and updated as needed this visit by Provider         ROS:  Constitutional, HEENT,  "cardiovascular, pulmonary, gi and gu systems are negative, except as otherwise noted.    OBJECTIVE:     BP (!) 143/93 (BP Location: Right arm, Patient Position: Sitting, Cuff Size: Adult Regular)  Pulse 80  Temp 98.9  F (37.2  C) (Temporal)  Ht 5' 3.5\" (1.613 m)  Wt 150 lb 12.8 oz (68.4 kg)  LMP 11/24/2018 (Exact Date)  SpO2 98%  BMI 26.29 kg/m2  Body mass index is 26.29 kg/(m^2).  GENERAL: healthy, alert and no distress  EYES: PERRL and conjunctiva/corneas- conjunctival injection left eye medial side. Cornea normal. Fundoscopy normal.  Tetracaine eyedrops applied and then fluorescein stain of the eye performed.  The exam was normal.    Diagnostic Test Results:  none     ASSESSMENT/PLAN:       1.  Viral left eye conjunctivitis suspected.  Patient informed that the symptoms should get better on their own over the next few days.  If her symptoms get worse after 4 or 5 days and she should let us know and and a prescription for antibiotic eyedrops.  Informed that most conjunctivitis tend to be viral so we will not prescribe antibiotic drops unless her symptoms do not resolve.  Patient agreeable with the plan.    Wojciech Collier MD  [facility]  "

## 2018-12-06 NOTE — MR AVS SNAPSHOT
After Visit Summary   12/6/2018    Sindhu Bennett    MRN: [id number]           Patient Information     Date Of Birth          1975        Visit Information        Provider Department      12/6/2018 8:10 AM Wojciech Collire MD [facility]        Today's Diagnoses     Viral conjunctivitis of left eye    -  1       Follow-ups after your visit        Follow-up notes from your care team     Return if symptoms worsen or fail to improve.      Who to contact     If you have questions or need follow up information about today's clinic visit or your schedule please contact [facility] directly at [phone].  Normal or non-critical lab and imaging results will be communicated to you by [employer], letter or phone within 4 business days after the clinic has received the results. If you do not hear from us within 7 days, please contact the clinic through [employer] or phone. If you have a critical or abnormal lab result, we will notify you by phone as soon as possible.  Submit refill requests through [employer] or call your pharmacy and they will forward the refill request to us. Please allow 3 business days for your refill to be completed.          Additional Information About Your Visit        MyChart Information     [employer] gives you secure access to your electronic health record. If you see a primary care provider, you can also send messages to your care team and make appointments. If you have questions, please call your primary care clinic.  If you do not have a primary care provider, please call [phone] and they will assist you.      [employer] is an electronic gateway that provides easy, online access to your medical records. With [employer], you can request a clinic appointment, read your test results, renew a prescription or communicate with your care team.     To access your existing account, please contact your [facility] or call  "[phone] for assistance.        Care EveryWhere ID     This is your Care EveryWhere ID. This could be used by other organizations to access your [place] medical records  [id number]        Your Vitals Were     Pulse Temperature Height Last Period Pulse Oximetry BMI (Body Mass Index)    80 98.9  F (37.2  C) (Temporal) 5' 3.5\" (1.613 m) 11/24/2018 (Exact Date) 98% 26.29 kg/m2       Blood Pressure from Last 3 Encounters:   12/06/18 (!) 143/93   11/30/18 118/78   11/29/18 136/84    Weight from Last 3 Encounters:   12/06/18 150 lb 12.8 oz (68.4 kg)   11/30/18 154 lb (69.9 kg)   11/29/18 152 lb (68.9 kg)              Today, you had the following     No orders found for display       Primary Care Provider Office Phone # Fax #    Sonia Rodriguez MD PhD [phone]       [address] [facility] [address]        Equal Access to Services     [facility]: Hadii jess monahan hadasho Sovanessa, waaxda luqadaha, qaybta kaalmada aderaj, shannan pinzon . So [facility] [phone].    ATENCIÓN: Si habla español, tiene a peralta disposición servicios gratuitos de asistencia lingüística. Ll[facility] [phone].    We comply with applicable federal civil rights laws and [place] laws. We do not discriminate on the basis of race, color, national origin, age, disability, sex, sexual orientation, or gender identity.            Thank you!     Thank you for choosing [facility]  for your care. Our goal is always to provide you with excellent care. Hearing back from our patients is one way we can continue to improve our services. Please take a few minutes to complete the written survey that you may receive in the mail after your visit with us. Thank you!             Your Updated Medication List - Protect others around you: Learn how to safely use, store and throw away your medicines at www.disposemymeds.org.          This list is accurate as of 12/6/18  9:15 AM.  Always use your most recent " med list.                   Brand Name Dispense Instructions for use Diagnosis    aspirin 81 MG chewable tablet    ASA    90 tablet    Take 1 tablet (81 mg) by mouth daily    S/P repair of coarctation of aorta, Coarctation of aorta       fluticasone 50 MCG/ACT nasal spray    FLONASE    16 g    Spray 2 sprays into both nostrils daily    Acute maxillary sinusitis, recurrence not specified       metoprolol succinate ER 50 MG 24 hr tablet    TOPROL-XL    90 tablet    Take 1 tablet (50 mg) by mouth daily    Hypertension, goal below 140/90       simvastatin 20 MG tablet    ZOCOR    90 tablet    Take 1 tablet (20 mg) by mouth At Bedtime    S/P repair of coarctation of aorta, Coarctation of aorta

## 2019-03-04 ENCOUNTER — DOCUMENTATION ONLY (OUTPATIENT)
Dept: LAB | Facility: CLINIC | Age: 44
End: 2019-03-04

## 2019-03-04 DIAGNOSIS — I10 HYPERTENSION, GOAL BELOW 140/90: Primary | ICD-10-CM

## 2019-03-04 DIAGNOSIS — Z13.6 CARDIOVASCULAR SCREENING; LDL GOAL LESS THAN 130: ICD-10-CM

## 2019-03-10 DIAGNOSIS — Q25.1 COARCTATION OF AORTA: ICD-10-CM

## 2019-03-10 DIAGNOSIS — Z87.74 S/P REPAIR OF COARCTATION OF AORTA: ICD-10-CM

## 2019-03-11 RX ORDER — SIMVASTATIN 20 MG
TABLET ORAL
Qty: 30 TABLET | Refills: 1 | Status: SHIPPED | OUTPATIENT
Start: 2019-03-11 | End: 2019-04-25

## 2019-04-05 ENCOUNTER — OFFICE VISIT (OUTPATIENT)
Dept: PEDIATRICS | Facility: CLINIC | Age: 44
End: 2019-04-05
Payer: MEDICAID

## 2019-04-05 VITALS
OXYGEN SATURATION: 98 % | HEIGHT: 64 IN | RESPIRATION RATE: 16 BRPM | WEIGHT: 151.7 LBS | TEMPERATURE: 98.6 F | HEART RATE: 86 BPM | SYSTOLIC BLOOD PRESSURE: 135 MMHG | DIASTOLIC BLOOD PRESSURE: 89 MMHG | BODY MASS INDEX: 25.9 KG/M2

## 2019-04-05 DIAGNOSIS — H66.002 ACUTE SUPPURATIVE OTITIS MEDIA OF LEFT EAR WITHOUT SPONTANEOUS RUPTURE OF TYMPANIC MEMBRANE, RECURRENCE NOT SPECIFIED: Primary | ICD-10-CM

## 2019-04-05 DIAGNOSIS — Z91.09 ENVIRONMENTAL ALLERGIES: ICD-10-CM

## 2019-04-05 DIAGNOSIS — H60.392 INFECTIVE OTITIS EXTERNA, LEFT: ICD-10-CM

## 2019-04-05 PROCEDURE — 99214 OFFICE O/P EST MOD 30 MIN: CPT | Performed by: FAMILY MEDICINE

## 2019-04-05 RX ORDER — CEFDINIR 300 MG/1
300 CAPSULE ORAL 2 TIMES DAILY
Qty: 20 CAPSULE | Refills: 0 | Status: SHIPPED | OUTPATIENT
Start: 2019-04-05 | End: 2019-04-25

## 2019-04-05 RX ORDER — TRIAMCINOLONE ACETONIDE 1 MG/G
1 CREAM TOPICAL 2 TIMES DAILY
Qty: 453 G | Refills: 0 | Status: SHIPPED | OUTPATIENT
Start: 2019-04-05 | End: 2019-04-05

## 2019-04-05 RX ORDER — CEFDINIR 300 MG/1
300 CAPSULE ORAL 2 TIMES DAILY
Qty: 20 CAPSULE | Refills: 0 | Status: SHIPPED | OUTPATIENT
Start: 2019-04-05 | End: 2019-04-05

## 2019-04-05 RX ORDER — NEOMYCIN/POLYMYXIN B/HYDROCORT 3.5-10K-1
1-2 SUSPENSION, DROPS(FINAL DOSAGE FORM)(ML) OPHTHALMIC (EYE) 4 TIMES DAILY
Qty: 7.5 ML | Refills: 0 | Status: SHIPPED | OUTPATIENT
Start: 2019-04-05 | End: 2019-04-05

## 2019-04-05 RX ORDER — CETIRIZINE HYDROCHLORIDE, PSEUDOEPHEDRINE HYDROCHLORIDE 5; 120 MG/1; MG/1
1 TABLET, FILM COATED, EXTENDED RELEASE ORAL 2 TIMES DAILY PRN
Qty: 60 TABLET | Refills: 0 | Status: SHIPPED | OUTPATIENT
Start: 2019-04-05 | End: 2019-04-05

## 2019-04-05 RX ORDER — CETIRIZINE HYDROCHLORIDE, PSEUDOEPHEDRINE HYDROCHLORIDE 5; 120 MG/1; MG/1
1 TABLET, FILM COATED, EXTENDED RELEASE ORAL 2 TIMES DAILY PRN
Qty: 60 TABLET | Refills: 0 | Status: SHIPPED | OUTPATIENT
Start: 2019-04-05 | End: 2019-11-18

## 2019-04-05 RX ORDER — NEOMYCIN SULFATE, POLYMYXIN B SULFATE, HYDROCORTISONE 3.5; 10000; 1 MG/ML; [USP'U]/ML; MG/ML
3 SOLUTION/ DROPS AURICULAR (OTIC) 4 TIMES DAILY
Qty: 10 ML | Refills: 0 | Status: SHIPPED | OUTPATIENT
Start: 2019-04-05 | End: 2019-09-27

## 2019-04-05 RX ORDER — TRIAMCINOLONE ACETONIDE 1 MG/G
1 CREAM TOPICAL 2 TIMES DAILY
Qty: 453 G | Refills: 0 | Status: SHIPPED | OUTPATIENT
Start: 2019-04-05 | End: 2019-09-27

## 2019-04-05 ASSESSMENT — MIFFLIN-ST. JEOR: SCORE: 1329.14

## 2019-04-05 ASSESSMENT — PAIN SCALES - GENERAL: PAINLEVEL: SEVERE PAIN (6)

## 2019-04-05 NOTE — PROGRESS NOTES
SUBJECTIVE:   Sindhu Bennett is a 43 year old female who presents to clinic today for the following health issues:    Acute Illness   Acute illness concerns: Ear ache; bilateral; left worse; drainage  Onset: x 1 week    Fever: no     Chills/Sweats: no    Headache (location?): no     Sinus Pressure:YES    Conjunctivitis:  YES: bilateral    Ear Pain: YES: bilateral    Rhinorrhea: YES    Congestion: no     Sore Throat: no      Cough: no    Wheeze: no    Decreased Appetite: no    Nausea: no    Vomiting: no    Diarrhea:  no    Dysuria/Freq.: no    Fatigue/Achiness: no    Sick/Strep Exposure: no     Therapies Tried and outcome: Netipot - has helped     Eye(s) Problem  Onset: x 3 months/ intermittent     Description:   Location: bilateral  Pain: no   Redness: YES- on outer parts of eye lid and inner eye near tear duct    Accompanying Signs & Symptoms:  Discharge/mattering: YES- slight; crust; potential puss  Swelling: YES  Visual changes: no   Fever: no   Nasal Congestion: no   Bothered by bright lights: no     History:   Trauma: no   Foreign body exposure: no     Precipitating factors:   Wearing contacts: no     Alleviating factors:  Improved by:  none    Therapies Tried and outcome: Benadryl; did not help        Problem list and histories reviewed & adjusted, as indicated.  Additional history: as documented    Patient Active Problem List   Diagnosis     Coarctation of aorta     Essential hypertension     CARDIOVASCULAR SCREENING; LDL GOAL LESS THAN 130     HSV infection     S/P repair of coarctation of aorta     Hypertension, goal below 140/90     Environmental allergies     Past Surgical History:   Procedure Laterality Date     ANGIOPLASTY       APPENDECTOMY  2006     BIOPSY  '96 & '98    when cyst were removed     C LAP OVARIAN CYSTOTOMY      4 different surgeries       Social History     Tobacco Use     Smoking status: Never Smoker     Smokeless tobacco: Never Used   Substance Use Topics     Alcohol use:  Yes     Comment: occ.     Family History   Problem Relation Age of Onset     Hypertension Mother      Diabetes Father      Hypertension Father      Cancer - colorectal Maternal Grandfather      C.A.D. Maternal Grandfather      Diabetes Maternal Grandfather      Hypertension Maternal Grandfather      C.A.D. Maternal Uncle      Cerebrovascular Disease Maternal Uncle      Hypertension Maternal Uncle      Diabetes Maternal Uncle      Diabetes Maternal Grandmother      Multiple Sclerosis Maternal Grandmother      Hypertension Brother      Colon Cancer Paternal Grandfather      Coronary Artery Disease Paternal Uncle      Cervical Cancer Sister         stage 4 and now  .      Asthma No family hx of      Breast Cancer No family hx of      Prostate Cancer No family hx of      Thyroid Disease No family hx of      Genetic Disorder No family hx of          Current Outpatient Medications   Medication Sig Dispense Refill     aspirin 81 MG chewable tablet Take 1 tablet (81 mg) by mouth daily 90 tablet 3     cefdinir (OMNICEF) 300 MG capsule Take 1 capsule (300 mg) by mouth 2 times daily 20 capsule 0     cetirizine-pseudoePHEDrine ER (ZYRTEC-D) 5-120 MG 12 hr tablet Take 1 tablet by mouth 2 times daily as needed for allergies 60 tablet 0     fluticasone (FLONASE) 50 MCG/ACT spray Spray 2 sprays into both nostrils daily 16 g 1     metoprolol succinate (TOPROL-XL) 50 MG 24 hr tablet Take 1 tablet (50 mg) by mouth daily 90 tablet 1     neomycin-polymyxin-hydrocortisone (CORTISPORIN) 3.5-13138-7 otic solution Place 3 drops into both ears 4 times daily 10 mL 0     simvastatin (ZOCOR) 20 MG tablet TAKE ONE TABLET BY MOUTH AT BEDTIME  30 tablet 1     triamcinolone (KENALOG) 0.1 % external cream Apply 1 g topically 2 times daily 453 g 0       Reviewed and updated as needed this visit by clinical staff  Tobacco  Allergies  Meds  Med Hx  Surg Hx  Fam Hx       Reviewed and updated as needed this visit by Provider  Med Hx  Surg  "Hx  Fam Hx         ROS:  Constitutional, HEENT, cardiovascular, pulmonary, gi and gu systems are negative, except as otherwise noted.    OBJECTIVE:     /89 (BP Location: Right arm, Patient Position: Chair, Cuff Size: Adult Regular)   Pulse 86   Temp 98.6  F (37  C) (Oral)   Resp 16   Ht 1.619 m (5' 3.75\")   Wt 68.8 kg (151 lb 11.2 oz)   SpO2 98%   BMI 26.24 kg/m    Body mass index is 26.24 kg/m .   GENERAL: healthy, alert and no distress  EYES: Eyes grossly normal to inspection, PERRL and conjunctivae and sclerae normal  HENT: Dry skin flakes bilateral ear with left TM erythematous with purulent drainage, tenderness left outer ear and TM's normal, nose and mouth without ulcers or lesions  NECK: no adenopathy, no asymmetry, masses, or scars and thyroid normal to palpation  RESP: lungs clear to auscultation - no rales, rhonchi or wheezes  CV: regular rate and rhythm, normal S1 S2, no S3 or S4, no murmur, click or rub, no peripheral edema and peripheral pulses strong  ABDOMEN: soft, nontender, no hepatosplenomegaly, no masses and bowel sounds normal  MS: no gross musculoskeletal defects noted, no edema    Diagnostic Test Results:  none     ASSESSMENT:   Sindhu was seen today for ear problem and eye problem.    Diagnoses and all orders for this visit:    Acute suppurative otitis media of left ear without spontaneous rupture of tympanic membrane, recurrence not specified  -     Discontinue: cefdinir (OMNICEF) 300 MG capsule; Take 1 capsule (300 mg) by mouth 2 times daily for 10 days  -     cefdinir (OMNICEF) 300 MG capsule; Take 1 capsule (300 mg) by mouth 2 times daily  -     neomycin-polymyxin-hydrocortisone (CORTISPORIN) 3.5-33154-0 otic solution; Place 3 drops into both ears 4 times daily    Environmental allergies  -     Discontinue: cetirizine-pseudoePHEDrine ER (ZYRTEC-D) 5-120 MG 12 hr tablet; Take 1 tablet by mouth 2 times daily as needed for allergies  -     cetirizine-pseudoePHEDrine ER " (ZYRTEC-D) 5-120 MG 12 hr tablet; Take 1 tablet by mouth 2 times daily as needed for allergies       -     There wasn't any eye swelling during my physical exam but I am suspecting her symptoms maybe related to allergies as swelling is usually associated             with itchy and watery eyes.    Infective otitis externa, left  -     Discontinue: triamcinolone (KENALOG) 0.1 % external cream; Apply 1 g topically 2 times daily  -     Discontinue: neomycin-polymyxin-hydrocortisone (CORTISPORIN) 3.5-02998-1 ophthalmic suspension; Apply 1-2 drops to eye 4 times daily  -     triamcinolone (KENALOG) 0.1 % external cream; Apply 1 g topically 2 times daily  -     Discontinue: neomycin-polymyxin-hydrocortisone (CORTISPORIN) 3.5-84825-7 ophthalmic suspension; Apply 1-2 drops to eye 4 times daily  -     neomycin-polymyxin-hydrocortisone (CORTISPORIN) 3.5-23059-0 otic solution; Place 3 drops into both ears 4 times daily        PLAN:   Orders per epic care, Symptomatic therapy suggested: push fluids, use vaporizer or mist needed  and use acetaminophen, ibuprofen as needed. Call or return to clinic if symptoms worsen or persist.    See Patient Instructions    Saniya Blandon MD  [facility]

## 2019-04-05 NOTE — PATIENT INSTRUCTIONS
Patient Education   Patient Education     Causes of Nasal Allergies    Nasal allergies are most commonly caused by one or more of 4 kinds of allergens: pollen (which causes seasonal allergies), house-dust mites, mold, and animals. Other substances, called irritants, can bother the nose and make allergy symptoms worse.  Pollen  Plants reproduce by moving tiny grains of pollen from plant to plant. Some pollen is carried by bees, and some is blown by the wind. It s the wind-blown pollen that causes nasal allergies. The amount of pollen in the air varies from season to season.  House-dust mites  House-dust mites are tiny bugs too small to see. They can live in mattresses, blankets, stuffed toys, carpets, and curtains. The droppings of these mites are a common indoor cause of nasal allergies.  Mold  Mold loves dark, damp areas. It tends to grow in bathrooms, basements, refrigerators, and in the soil of houseplants. Mold reproduces by sending tiny grains called spores into the air. If these spores are breathed in, they can cause a nasal allergic reaction.  Animals  Pets, such as cats, dogs, birds, horses, and rabbits, are common causes of nasal allergies. Flakes of skin (dander), saliva left on fur when an animal cleans itself, urine in litter boxes and cages, and feathers can all cause nasal allergies.  Irritants make allergies worse  Although irritants don t cause nasal allergies, they can make allergy symptoms worse. Cigarette smoke, perfume, aerosol sprays, smoke from wood stoves or fireplaces, car exhaust, and strong odors are examples of irritants.   Date Last Reviewed: 9/1/2016 2000-2018 [employer]. [address] [place] [id number]. All rights reserved. This information is not intended as a substitute for professional medical care. Always follow your healthcare professional's instructions.           External Ear Infection (Adult)    External otitis (also called  swimmer s ear ) is an  infection in the ear canal. It is often caused by bacteria or fungus. It can occur a few days after water gets trapped in the ear canal (from swimming or bathing). It can also occur after cleaning too deeply in the ear canal with a cotton swab or other object. Sometimes, hair care products get into the ear canal and cause this problem.  Symptoms can include pain, fever, itching, redness, drainage, or swelling of the ear canal. Temporary hearing loss may also occur.  Home care    Do not try to clean the ear canal. This can push pus and bacteria deeper into the canal.    Use prescribed ear drops as directed. These help reduce swelling and fight the infection. If an ear wick was placed in the ear canal, apply drops right onto the end of the wick. The wick will draw the medicine into the ear canal even if it is swollen closed.    A cotton ball may be loosely placed in the outer ear to absorb any drainage.    You may use acetaminophen or ibuprofen to control pain, unless another medicine was prescribed. Note: If you have chronic liver or kidney disease or ever had a stomach ulcer or GI bleeding, talk to your healthcare provider before taking any of these medicines.    Do not allow water to get into your ear when bathing. Also, don't swim until the infection has cleared.  Prevention    Keep your ears dry. This helps lower the risk of infection. Dry your ears with a towel or hair dryer after getting wet. Also, use ear plugs when swimming.    Do not stick any objects in the ear to remove wax.    If you feel water trapped in your ear, use ear drops right away. You can get these drops over the counter at most drugstores. They work by removing water from the ear canal.  Follow-up care  Follow up with your healthcare provider in 1 week, or as advised.  When to seek medical advice  Call your healthcare provider right away if any of these occur:    Ear pain becomes worse or doesn t improve after 3 days of treatment    Redness or  swelling of the outer ear occurs or gets worse    Headache    Painful or stiff neck    Drowsiness or confusion    Fever of 100.4 F (38 C) or higher, or as directed by your healthcare provider    Seizure  Date Last Reviewed: 10/1/2017    [id number] [employer]. [address] [place] [id number]. All rights reserved. This information is not intended as a substitute for professional medical care. Always follow your healthcare professional's instructions.         OTITIS MEDIA (EAR INFECTIONS)    WHAT IS AN EAR INFECTION?  An ear infection means that your child has infected  fluid in the middle ear. Sometimes with a cold or following treatment for an infection, fluid can remain in the middle ear  but not be infected.    WHAT CAUSES EAR INFECTIONS?  The middle ear is usually filled with air. The  eustachian tube, which is a narrow canal from the middle ear  to the back of the throat, opens and closes with swallowing, yawning, etc. This keeps air in the middle ear at a pressure  which is equal to the pressure outside the ear. When the eustachian tube does not work well, the pressure in the  middle ear becomes more negative and fluid collects. Once  fluid is present, bacteria which usually live in the mouth and  nose can easily set up an infection.  Anything which causes the eustachian tube to not  open and close normally can set up the conditions which are  right for an ear infection. Colds or upper respiratory tract infections are the most common causes because the tissues  lining the tube get slightly swollen. Teething and irritants  such as second hand smoke or allergies can be triggers as  well. A child who drinks a bottle lying flat may get fluid into  the middle ear when swallowing.    WHY SO MANY EAR INFECTIONS?  Most children get at least one ear infection before age  five. Some children get repeated infections. In most cases, L this is because of that child's unique anatomy: facial  structure,  eustachian tube anatomy, tonsil and adenoid size, frequency of colds, etc. Fortunately as children grow, their eustachian tubes get bigger (and straighter) while colds  become less frequent so ear infections decrease. We do know that babies who are breast fed for 4 or more months have fewer ear infections. Children who are around cigarette smoke have more infections. Children who attend  have more frequent ear infections.    DOES MY CHILD NEED MEDICATION?  Most ear infections are treated with antibiotics to kill  the bacteria that are present in the middle ear fluid. (Some  ear infections are caused by viruses and antibiotics will not  help.) Antibiotics do help prevent complications such as mastoiditis.  Antibiotics do not make the fluid go away; however the  fluid usually disappears over several weeks. Many  treatments have been tried to clear the fluid. Medicines such  as decongestants, steroids, antihistamines, etc. are usually  not effective. Non-medical treatments such as chiropractic manipulation, herbal medicines are probably no more  effective than doing nothing. Surgical treatment such as PE  tubes are effective but do have risks. PE tubes are usually  placed only if the fluid has been present consistently for over  3 to 4 months and is interfering with hearing or language development.    WHAT CAN I DO FOR MY CHILD?    *give acetaminophen (tylenol) regularly    *prop up the head of the child's bed at sleep time    *several drops of slightly warmed mineral oil or olive oil in the   ear canal may give some relief.

## 2019-04-08 DIAGNOSIS — Q25.1 COARCTATION OF AORTA: ICD-10-CM

## 2019-04-08 DIAGNOSIS — Z87.74 S/P REPAIR OF COARCTATION OF AORTA: ICD-10-CM

## 2019-04-09 RX ORDER — ASPIRIN 81 MG/1
TABLET, CHEWABLE ORAL
Qty: 90 TABLET | Refills: 3 | Status: SHIPPED | OUTPATIENT
Start: 2019-04-09 | End: 2020-03-10

## 2019-04-25 ENCOUNTER — OFFICE VISIT (OUTPATIENT)
Dept: PEDIATRICS | Facility: CLINIC | Age: 44
End: 2019-04-25
Payer: MEDICAID

## 2019-04-25 VITALS
HEIGHT: 64 IN | HEART RATE: 71 BPM | DIASTOLIC BLOOD PRESSURE: 77 MMHG | TEMPERATURE: 98.3 F | WEIGHT: 147 LBS | SYSTOLIC BLOOD PRESSURE: 126 MMHG | BODY MASS INDEX: 25.1 KG/M2 | OXYGEN SATURATION: 100 %

## 2019-04-25 DIAGNOSIS — Q25.1 COARCTATION OF AORTA: ICD-10-CM

## 2019-04-25 DIAGNOSIS — I10 HYPERTENSION, GOAL BELOW 140/90: ICD-10-CM

## 2019-04-25 DIAGNOSIS — Z00.00 ROUTINE HISTORY AND PHYSICAL EXAMINATION OF ADULT: Primary | ICD-10-CM

## 2019-04-25 DIAGNOSIS — Z87.74 S/P REPAIR OF COARCTATION OF AORTA: ICD-10-CM

## 2019-04-25 DIAGNOSIS — Z13.6 CARDIOVASCULAR SCREENING; LDL GOAL LESS THAN 130: ICD-10-CM

## 2019-04-25 DIAGNOSIS — J01.00 ACUTE MAXILLARY SINUSITIS, RECURRENCE NOT SPECIFIED: ICD-10-CM

## 2019-04-25 DIAGNOSIS — J30.89 PERENNIAL ALLERGIC RHINITIS: ICD-10-CM

## 2019-04-25 DIAGNOSIS — N63.0 LUMP OR MASS IN BREAST: ICD-10-CM

## 2019-04-25 LAB
ANION GAP SERPL CALCULATED.3IONS-SCNC: 4 MMOL/L (ref 3–14)
BUN SERPL-MCNC: 13 MG/DL (ref 7–30)
CALCIUM SERPL-MCNC: 8.7 MG/DL (ref 8.5–10.1)
CHLORIDE SERPL-SCNC: 107 MMOL/L (ref 94–109)
CHOLEST SERPL-MCNC: 114 MG/DL
CO2 SERPL-SCNC: 27 MMOL/L (ref 20–32)
CREAT SERPL-MCNC: 0.84 MG/DL (ref 0.52–1.04)
CREAT UR-MCNC: 44 MG/DL
GFR SERPL CREATININE-BSD FRML MDRD: 86 ML/MIN/{1.73_M2}
GLUCOSE SERPL-MCNC: 90 MG/DL (ref 70–99)
HDLC SERPL-MCNC: 44 MG/DL
LDLC SERPL CALC-MCNC: 53 MG/DL
MICROALBUMIN UR-MCNC: <5 MG/L
MICROALBUMIN/CREAT UR: NORMAL MG/G CR (ref 0–25)
NONHDLC SERPL-MCNC: 70 MG/DL
POTASSIUM SERPL-SCNC: 4.2 MMOL/L (ref 3.4–5.3)
SODIUM SERPL-SCNC: 138 MMOL/L (ref 133–144)
TRIGL SERPL-MCNC: 86 MG/DL

## 2019-04-25 PROCEDURE — 99213 OFFICE O/P EST LOW 20 MIN: CPT | Mod: 25 | Performed by: INTERNAL MEDICINE

## 2019-04-25 PROCEDURE — 80061 LIPID PANEL: CPT | Performed by: INTERNAL MEDICINE

## 2019-04-25 PROCEDURE — 99396 PREV VISIT EST AGE 40-64: CPT | Performed by: INTERNAL MEDICINE

## 2019-04-25 PROCEDURE — 80048 BASIC METABOLIC PNL TOTAL CA: CPT | Performed by: INTERNAL MEDICINE

## 2019-04-25 PROCEDURE — 82043 UR ALBUMIN QUANTITATIVE: CPT | Performed by: INTERNAL MEDICINE

## 2019-04-25 PROCEDURE — 36415 COLL VENOUS BLD VENIPUNCTURE: CPT | Performed by: INTERNAL MEDICINE

## 2019-04-25 RX ORDER — SIMVASTATIN 20 MG
TABLET ORAL
Qty: 90 TABLET | Refills: 3 | Status: SHIPPED | OUTPATIENT
Start: 2019-04-25 | End: 2020-05-18

## 2019-04-25 RX ORDER — FLUTICASONE PROPIONATE 50 MCG
2 SPRAY, SUSPENSION (ML) NASAL DAILY
Qty: 48 G | Refills: 3 | Status: SHIPPED | OUTPATIENT
Start: 2019-04-25 | End: 2020-08-27

## 2019-04-25 RX ORDER — METOPROLOL SUCCINATE 50 MG/1
50 TABLET, EXTENDED RELEASE ORAL DAILY
Qty: 90 TABLET | Refills: 3 | Status: SHIPPED | OUTPATIENT
Start: 2019-04-25 | End: 2020-05-18

## 2019-04-25 ASSESSMENT — MIFFLIN-ST. JEOR: SCORE: 1307.82

## 2019-04-25 NOTE — PATIENT INSTRUCTIONS
Make appointment(s) for:   -- mammogram and ultrasound.         Medication(s) prescribed today:    Orders Placed This Encounter   Medications     fluticasone (FLONASE) 50 MCG/ACT nasal spray     Sig: Spray 2 sprays into both nostrils daily     Dispense:  48 g     Refill:  3     metoprolol succinate ER (TOPROL-XL) 50 MG 24 hr tablet     Sig: Take 1 tablet (50 mg) by mouth daily     Dispense:  90 tablet     Refill:  3     simvastatin (ZOCOR) 20 MG tablet     Sig: TAKE ONE TABLET BY MOUTH AT BEDTIME     Dispense:  90 tablet     Refill:  3           Preventive Health Recommendations  Female Ages 40 to 49    Yearly exam:     See your health care provider every year in order to  1. Review health changes.   2. Discuss preventive care.    3. Review your medicines if your doctor prescribed any.      Get a Pap test every three years (unless you have an abnormal result and your provider advises testing more often).      If you get Pap tests with HPV test, you only need to test every 5 years, unless you have an abnormal result. You do not need a Pap test if your uterus was removed (hysterectomy) and you have not had cancer.      You should be tested each year for STDs (sexually transmitted diseases), if you're at risk.     Ask your doctor if you should have a mammogram.      Have a colonoscopy (test for colon cancer) if someone in your family has had colon cancer or polyps before age 50.       Have a cholesterol test every 5 years.       Have a diabetes test (fasting glucose) after age 45. If you are at risk for diabetes, you should have this test every 3 years.    Shots: Get a flu shot each year. Get a tetanus shot every 10 years.     Nutrition:     Eat at least 5 servings of fruits and vegetables each day.    Eat whole-grain bread, whole-wheat pasta and brown rice instead of white grains and rice.    Get adequate Calcium and Vitamin D.      Lifestyle    Exercise at least 150 minutes a week (an average of 30 minutes a day, 5  days a week). This will help you control your weight and prevent disease.    Limit alcohol to one drink per day.    No smoking.     Wear sunscreen to prevent skin cancer.    See your dentist every six months for an exam and cleaning.

## 2019-04-25 NOTE — PROGRESS NOTES
SUBJECTIVE:   CC: Sindhu Bennett is an 43 year old woman who presents for preventive health visit.     Healthy Habits:    Do you get at least three servings of calcium containing foods daily (dairy, green leafy vegetables, etc.)? yes    Amount of exercise or daily activities, outside of work: 7 day(s) per week    Problems taking medications regularly No    Medication side effects: No    Have you had an eye exam in the past two years? yes    Do you see a dentist twice per year? yes    Do you have sleep apnea, excessive snoring or daytime drowsiness?no    HPI:  Sister age 46  from stage 4 cervical cancer last August. She was diagnosed in July, didn't tolerate chemo.   Patient's last pap was in 2018 normal with negative HPV.     Today's PHQ-2 Score:   PHQ-2 (  [employer]) 10/24/2018 2018   Q1: Little interest or pleasure in doing things 0 0   Q2: Feeling down, depressed or hopeless 0 0   PHQ-2 Score 0 0       Abuse: Current or Past(Physical, Sexual or Emotional)- No  Do you feel safe in your environment? Yes    Social History     Tobacco Use     Smoking status: Never Smoker     Smokeless tobacco: Never Used   Substance Use Topics     Alcohol use: Yes     Comment: occ.     If you drink alcohol do you typically have >3 drinks per day or >7 drinks per week? No                     Reviewed orders with patient.  Reviewed health maintenance and updated orders accordingly - Yes  BP Readings from Last 3 Encounters:   19 126/77   19 135/89   18 (!) 143/93    Wt Readings from Last 3 Encounters:   19 66.7 kg (147 lb)   19 68.8 kg (151 lb 11.2 oz)   18 68.4 kg (150 lb 12.8 oz)                    Mammogram Screening: Patient under age 50, mutual decision reflected in health maintenance.      Pertinent mammograms are reviewed under the imaging tab.  History of abnormal Pap smear: NO - age 30-65 PAP every 5 years with negative HPV co-testing recommended  PAP / HPV Latest Ref  "Rng & Units 2/8/2018 12/31/2014   PAP - NIL NIL   HPV 16 DNA NEG:Negative Negative -   HPV 18 DNA NEG:Negative Negative -   OTHER HR HPV NEG:Negative Negative -     Reviewed and updated as needed this visit by clinical staff  Tobacco  Allergies  Meds  Med Hx  Surg Hx  Fam Hx  Soc Hx        Reviewed and updated as needed this visit by Provider            ROS:  CONSTITUTIONAL: NEGATIVE for fever, chills, change in weight  INTEGUMENTARU/SKIN: NEGATIVE for worrisome rashes, moles or lesions  EYES: NEGATIVE for vision changes or irritation  ENT: NEGATIVE for ear, mouth and throat problems  RESP: NEGATIVE for significant cough or SOB  BREAST: NEGATIVE for masses, tenderness or discharge  CV: NEGATIVE for chest pain, palpitations or peripheral edema  GI: NEGATIVE for nausea, abdominal pain, heartburn, or change in bowel habits  : NEGATIVE for unusual urinary or vaginal symptoms. Periods are regular.  MUSCULOSKELETAL: NEGATIVE for significant arthralgias or myalgia  NEURO: NEGATIVE for weakness, dizziness or paresthesias  PSYCHIATRIC: NEGATIVE for changes in mood or affect    OBJECTIVE:   /77   Pulse 71   Temp 98.3  F (36.8  C) (Temporal)   Ht 1.619 m (5' 3.75\")   Wt 66.7 kg (147 lb)   SpO2 100%   BMI 25.43 kg/m    EXAM:  GENERAL: healthy, alert and no distress  EYES: Eyes grossly normal to inspection, PERRL and conjunctivae and sclerae normal  HENT: ear canals and TM's normal, nose and mouth without ulcers or lesions  NECK: no adenopathy, no asymmetry, masses, or scars and thyroid normal to palpation  RESP: lungs clear to auscultation - no rales, rhonchi or wheezes  BREAST: both breast has fibrocystic lumpiness in the outer upper quadrant but the right upper outer quadrant there is a more discrete lump, with slightly sharper edge.   CV: regular rate and rhythm, normal S1 S2, no S3 or S4, no murmur, click or rub, no peripheral edema and peripheral pulses strong  ABDOMEN: soft, nontender, no " hepatosplenomegaly, no masses and bowel sounds normal  MS: no gross musculoskeletal defects noted, no edema  SKIN: no suspicious lesions or rashes  NEURO: Normal strength and tone, mentation intact and speech normal  PSYCH: mentation appears normal, affect normal/bright    Diagnostic Test Results:  Results for orders placed or performed in visit on 04/25/19 (from the past 24 hour(s))   Lipid panel reflex to direct LDL Fasting   Result Value Ref Range    Cholesterol 114 <200 mg/dL    Triglycerides 86 <150 mg/dL    HDL Cholesterol 44 (L) >49 mg/dL    LDL Cholesterol Calculated 53 <100 mg/dL    Non HDL Cholesterol 70 <130 mg/dL   Basic metabolic panel   Result Value Ref Range    Sodium 138 133 - 144 mmol/L    Potassium 4.2 3.4 - 5.3 mmol/L    Chloride 107 94 - 109 mmol/L    Carbon Dioxide 27 20 - 32 mmol/L    Anion Gap 4 3 - 14 mmol/L    Glucose 90 70 - 99 mg/dL    Urea Nitrogen 13 7 - 30 mg/dL    Creatinine 0.84 0.52 - 1.04 mg/dL    GFR Estimate 86 >60 mL/min/[1.73_m2]    GFR Estimate If Black >90 >60 mL/min/[1.73_m2]    Calcium 8.7 8.5 - 10.1 mg/dL   Albumin Random Urine Quantitative with Creat Ratio   Result Value Ref Range    Creatinine Urine 44 mg/dL    Albumin Urine mg/L <5 mg/L    Albumin Urine mg/g Cr Unable to calculate due to low value 0 - 25 mg/g Cr       ASSESSMENT/PLAN:       ICD-10-CM    1. Routine history and physical examination of adult Z00.00    2. S/P repair of coarctation of aorta Z87.74 simvastatin (ZOCOR) 20 MG tablet   3. Coarctation of aorta Q25.1 simvastatin (ZOCOR) 20 MG tablet   4. Acute maxillary sinusitis, recurrence not specified J01.00    5. Hypertension, goal below 140/90 I10 metoprolol succinate ER (TOPROL-XL) 50 MG 24 hr tablet   6. Perennial allergic rhinitis J30.89 fluticasone (FLONASE) 50 MCG/ACT nasal spray   7. Lump or mass in breast N63.0 MA Diagnostic Digital Bilateral     US Breast Right Complete 4 Quadrants     -- stable chronic health issues. Medications refilled.    --  "somewhat discrete outer upper quadrant breast lump in the right breast: diagnostic mammo and US.     COUNSELING:   Reviewed preventive health counseling, as reflected in patient instructions    BP Readings from Last 1 Encounters:   04/25/19 126/77     Estimated body mass index is 25.43 kg/m  as calculated from the following:    Height as of this encounter: 1.619 m (5' 3.75\").    Weight as of this encounter: 66.7 kg (147 lb).      Weight management plan: using [employer] for better nutrition, has been able to lose a few lbs.      reports that she has never smoked. She has never used smokeless tobacco.      Counseling Resources:  ATP IV Guidelines  Pooled Cohorts Equation Calculator  Breast Cancer Risk Calculator  FRAX Risk Assessment  ICSI Preventive Guidelines  Dietary Guidelines for Americans, 2010  USDA's MyPlate  ASA Prophylaxis  Lung CA Screening    Sonia Rodriguez MD PhD  [facility]  "

## 2019-04-30 NOTE — RESULT ENCOUNTER NOTE
Results discussed directly with patient while patient was present. Any further details documented in the note.   Sonia Rodriguez MD PhD

## 2019-05-01 ENCOUNTER — ANCILLARY PROCEDURE (OUTPATIENT)
Dept: MAMMOGRAPHY | Facility: CLINIC | Age: 44
End: 2019-05-01
Attending: INTERNAL MEDICINE
Payer: MEDICAID

## 2019-05-01 ENCOUNTER — ANCILLARY PROCEDURE (OUTPATIENT)
Dept: ULTRASOUND IMAGING | Facility: CLINIC | Age: 44
End: 2019-05-01
Attending: INTERNAL MEDICINE
Payer: MEDICAID

## 2019-05-01 DIAGNOSIS — N63.0 LUMP OR MASS IN BREAST: ICD-10-CM

## 2019-05-01 PROCEDURE — 76642 ULTRASOUND BREAST LIMITED: CPT | Mod: RT | Performed by: STUDENT IN AN ORGANIZED HEALTH CARE EDUCATION/TRAINING PROGRAM

## 2019-05-01 PROCEDURE — G0279 TOMOSYNTHESIS, MAMMO: HCPCS | Performed by: STUDENT IN AN ORGANIZED HEALTH CARE EDUCATION/TRAINING PROGRAM

## 2019-05-01 PROCEDURE — 77066 DX MAMMO INCL CAD BI: CPT | Performed by: STUDENT IN AN ORGANIZED HEALTH CARE EDUCATION/TRAINING PROGRAM

## 2019-06-07 ENCOUNTER — OFFICE VISIT (OUTPATIENT)
Dept: PEDIATRICS | Facility: CLINIC | Age: 44
End: 2019-06-07
Payer: MEDICAID

## 2019-06-07 VITALS
DIASTOLIC BLOOD PRESSURE: 78 MMHG | SYSTOLIC BLOOD PRESSURE: 128 MMHG | HEART RATE: 68 BPM | WEIGHT: 144 LBS | BODY MASS INDEX: 24.91 KG/M2 | TEMPERATURE: 98.1 F | OXYGEN SATURATION: 99 %

## 2019-06-07 DIAGNOSIS — J01.90 ACUTE SINUSITIS WITH COEXISTING CONDITION, NEED PROPHYLACTIC TREATMENT: Primary | ICD-10-CM

## 2019-06-07 PROCEDURE — 99213 OFFICE O/P EST LOW 20 MIN: CPT | Performed by: INTERNAL MEDICINE

## 2019-06-07 RX ORDER — AZITHROMYCIN 250 MG/1
TABLET, FILM COATED ORAL
Qty: 6 TABLET | Refills: 0 | Status: SHIPPED | OUTPATIENT
Start: 2019-06-07 | End: 2019-09-27

## 2019-06-07 NOTE — PROGRESS NOTES
Subjective     Sindhu Bennett is a 43 year old female who presents to clinic today for the following health issues:    HPI   RESPIRATORY SYMPTOMS      Duration: 1 week    Description  nasal congestion, rhinorrhea, facial pain/pressure, cough, wheezing, chills, headache, fatigue/malaise and hoarse voice    Severity: moderate    Accompanying signs and symptoms: None    History (predisposing factors):  none    Precipitating or alleviating factors: None    Therapies tried and outcome:  oral decongestant nasal spray/wash - nedi pot       Didn't use Flonase for allergies for 2 weeks and it turned into the severe sinus pressure, greenish nasal discharge. She is back on Flonase, using decongestant, mono pot but not improving.     ROS:  Constitutional, HEENT, cardiovascular, pulmonary, gi and gu systems are negative, except as otherwise noted.         Current Outpatient Medications on File Prior to Visit:  aspirin (ASA) 81 MG chewable tablet CHEW AND SWALLOW ONE TABLET BY MOUTH ONE TIME DAILY    cetirizine-pseudoePHEDrine ER (ZYRTEC-D) 5-120 MG 12 hr tablet Take 1 tablet by mouth 2 times daily as needed for allergies   fluticasone (FLONASE) 50 MCG/ACT nasal spray Spray 2 sprays into both nostrils daily   metoprolol succinate ER (TOPROL-XL) 50 MG 24 hr tablet Take 1 tablet (50 mg) by mouth daily   simvastatin (ZOCOR) 20 MG tablet TAKE ONE TABLET BY MOUTH AT BEDTIME   neomycin-polymyxin-hydrocortisone (CORTISPORIN) 3.5-44096-4 otic solution Place 3 drops into both ears 4 times daily (Patient not taking: Reported on 4/25/2019)   triamcinolone (KENALOG) 0.1 % external cream Apply 1 g topically 2 times daily (Patient not taking: Reported on 6/7/2019)     No current facility-administered medications on file prior to visit.        Patient Active Problem List   Diagnosis     Coarctation of aorta     Essential hypertension     CARDIOVASCULAR SCREENING; LDL GOAL LESS THAN 130     HSV infection     S/P repair of  coarctation of aorta     Hypertension, goal below 140/90     Environmental allergies     Past Surgical History:   Procedure Laterality Date     ANGIOPLASTY       APPENDECTOMY  2006     BIOPSY   &     when cyst were removed     C LAP OVARIAN CYSTOTOMY      4 different surgeries       Social History     Tobacco Use     Smoking status: Never Smoker     Smokeless tobacco: Never Used   Substance Use Topics     Alcohol use: Yes     Comment: occ.     Family History   Problem Relation Age of Onset     Hypertension Mother      Diabetes Father      Hypertension Father      Cancer - colorectal Maternal Grandfather      C.A.D. Maternal Grandfather      Diabetes Maternal Grandfather      Hypertension Maternal Grandfather      C.A.D. Maternal Uncle      Cerebrovascular Disease Maternal Uncle      Hypertension Maternal Uncle      Diabetes Maternal Uncle      Diabetes Maternal Grandmother      Multiple Sclerosis Maternal Grandmother      Hypertension Brother      Colon Cancer Paternal Grandfather      Coronary Artery Disease Paternal Uncle      Cervical Cancer Sister         stage 4 and now  .      Asthma No family hx of      Breast Cancer No family hx of      Prostate Cancer No family hx of      Thyroid Disease No family hx of      Genetic Disorder No family hx of              Problem list, Medication list, Allergies, and Medical/Social/Surgical histories reviewed in [facility] and updated as appropriate.    OBJECTIVE:                                                    /78   Pulse 68   Temp 98.1  F (36.7  C) (Temporal)   Wt 65.3 kg (144 lb)   SpO2 99%   BMI 24.91 kg/m      GENERAL: healthy, alert and no distress  HEENT: moderate sinus congestion, sinus tenderness, hoarseness  Neck: no adenopathy/mass/stiffness. Thyroid normal.  Lung: clear, no wheezing/rhonchi/crackles          ASSESSMENT/PLAN:                                                      43 year old female with the following diagnoses and treatment  plan:      ICD-10-CM    1. Acute sinusitis with coexisting condition, need prophylactic treatment J01.90 azithromycin (ZITHROMAX) 250 MG tablet       -- has allergy to amoxicillin and doxycyline. Able to tolerate Zpak in the past.     Will call or return to clinic if worsening or symptoms not improving as discussed.  See Patient Instructions.      Sonia Rodriguez MD-PhD  [facility]    (Note: Chart documentation was done in part with Dragon Voice Recognition software. Although reviewed after completion, some word and grammatical errors may remain.)

## 2019-06-07 NOTE — PATIENT INSTRUCTIONS
Medication(s) prescribed today:    Orders Placed This Encounter   Medications     azithromycin (ZITHROMAX) 250 MG tablet     Sig: Take 2 tablets (500 mg) by mouth daily for 1 day, THEN 1 tablet (250 mg) daily for 4 days.     Dispense:  6 tablet     Refill:  0

## 2019-08-28 ENCOUNTER — OFFICE VISIT (OUTPATIENT)
Dept: PEDIATRICS | Facility: CLINIC | Age: 44
End: 2019-08-28
Payer: MEDICAID

## 2019-08-28 VITALS
WEIGHT: 142.1 LBS | TEMPERATURE: 97.5 F | OXYGEN SATURATION: 100 % | DIASTOLIC BLOOD PRESSURE: 82 MMHG | SYSTOLIC BLOOD PRESSURE: 128 MMHG | BODY MASS INDEX: 24.26 KG/M2 | HEART RATE: 77 BPM | HEIGHT: 64 IN

## 2019-08-28 DIAGNOSIS — R53.83 FATIGUE, UNSPECIFIED TYPE: Primary | ICD-10-CM

## 2019-08-28 LAB
ALBUMIN SERPL-MCNC: 4.4 G/DL (ref 3.4–5)
ALP SERPL-CCNC: 75 U/L (ref 40–150)
ALT SERPL W P-5'-P-CCNC: 17 U/L (ref 0–50)
ANION GAP SERPL CALCULATED.3IONS-SCNC: 7 MMOL/L (ref 3–14)
AST SERPL W P-5'-P-CCNC: 16 U/L (ref 0–45)
BASOPHILS # BLD AUTO: 0.1 10E9/L (ref 0–0.2)
BASOPHILS NFR BLD AUTO: 0.9 %
BILIRUB SERPL-MCNC: 0.9 MG/DL (ref 0.2–1.3)
BUN SERPL-MCNC: 9 MG/DL (ref 7–30)
CALCIUM SERPL-MCNC: 9.2 MG/DL (ref 8.5–10.1)
CHLORIDE SERPL-SCNC: 105 MMOL/L (ref 94–109)
CO2 SERPL-SCNC: 28 MMOL/L (ref 20–32)
CREAT SERPL-MCNC: 0.68 MG/DL (ref 0.52–1.04)
DIFFERENTIAL METHOD BLD: NORMAL
EOSINOPHIL # BLD AUTO: 0.3 10E9/L (ref 0–0.7)
EOSINOPHIL NFR BLD AUTO: 3.5 %
ERYTHROCYTE [DISTWIDTH] IN BLOOD BY AUTOMATED COUNT: 13 % (ref 10–15)
GFR SERPL CREATININE-BSD FRML MDRD: >90 ML/MIN/{1.73_M2}
GLUCOSE SERPL-MCNC: 88 MG/DL (ref 70–99)
HCT VFR BLD AUTO: 42.9 % (ref 35–47)
HGB BLD-MCNC: 13.9 G/DL (ref 11.7–15.7)
IMM GRANULOCYTES # BLD: 0 10E9/L (ref 0–0.4)
IMM GRANULOCYTES NFR BLD: 0.3 %
LYMPHOCYTES # BLD AUTO: 2.3 10E9/L (ref 0.8–5.3)
LYMPHOCYTES NFR BLD AUTO: 29.4 %
MCH RBC QN AUTO: 27.9 PG (ref 26.5–33)
MCHC RBC AUTO-ENTMCNC: 32.4 G/DL (ref 31.5–36.5)
MCV RBC AUTO: 86 FL (ref 78–100)
MONOCYTES # BLD AUTO: 0.6 10E9/L (ref 0–1.3)
MONOCYTES NFR BLD AUTO: 7.1 %
NEUTROPHILS # BLD AUTO: 4.5 10E9/L (ref 1.6–8.3)
NEUTROPHILS NFR BLD AUTO: 58.8 %
PLATELET # BLD AUTO: 236 10E9/L (ref 150–450)
POTASSIUM SERPL-SCNC: 3.9 MMOL/L (ref 3.4–5.3)
PROT SERPL-MCNC: 8.2 G/DL (ref 6.8–8.8)
RBC # BLD AUTO: 4.99 10E12/L (ref 3.8–5.2)
SODIUM SERPL-SCNC: 140 MMOL/L (ref 133–144)
WBC # BLD AUTO: 7.7 10E9/L (ref 4–11)

## 2019-08-28 PROCEDURE — 84443 ASSAY THYROID STIM HORMONE: CPT | Performed by: FAMILY MEDICINE

## 2019-08-28 PROCEDURE — 87389 HIV-1 AG W/HIV-1&-2 AB AG IA: CPT | Performed by: FAMILY MEDICINE

## 2019-08-28 PROCEDURE — 86038 ANTINUCLEAR ANTIBODIES: CPT | Performed by: FAMILY MEDICINE

## 2019-08-28 PROCEDURE — 86431 RHEUMATOID FACTOR QUANT: CPT | Performed by: FAMILY MEDICINE

## 2019-08-28 PROCEDURE — 80053 COMPREHEN METABOLIC PANEL: CPT | Performed by: FAMILY MEDICINE

## 2019-08-28 PROCEDURE — 86803 HEPATITIS C AB TEST: CPT | Performed by: FAMILY MEDICINE

## 2019-08-28 PROCEDURE — 36415 COLL VENOUS BLD VENIPUNCTURE: CPT | Performed by: FAMILY MEDICINE

## 2019-08-28 PROCEDURE — 99213 OFFICE O/P EST LOW 20 MIN: CPT | Performed by: FAMILY MEDICINE

## 2019-08-28 PROCEDURE — 85025 COMPLETE CBC W/AUTO DIFF WBC: CPT | Performed by: FAMILY MEDICINE

## 2019-08-28 ASSESSMENT — MIFFLIN-ST. JEOR: SCORE: 1284.56

## 2019-08-28 ASSESSMENT — PAIN SCALES - GENERAL: PAINLEVEL: NO PAIN (0)

## 2019-08-28 NOTE — PROGRESS NOTES
"Subjective     Sindhu Bennett is a 44 year old female who presents to clinic today for the following health issues:    HPI     Fatigue    Onset: 1 month    Description: How severe is the fatigue? moderate    Does the fatigue interfere with your life:Yes Details:     How much sleep are you getting? 8-10 Hours     How much sleep do you normally need to feel well? 8 Hours  Exercise: no regular exercise program  Are there episodes of normal energy levels: yes    Accompanying Signs & Symptoms:  Falling asleep during the day: no  Snoring: no  Do you stop breathing while sleeping: no                 Night sweats: no      Fevers:no  Chest Pain: no  Pain other places in the body? no  Change in appetite: YES-                 Weight gain/loss: YES- 15 pound unintentional weight loss  Dark or bloody stools: no  Heavy periods (women) no  Substance use:             Caffeine use:YES- coffee morning, bought supplement from [employer] to help with symptoms              Alcohol use: Less than 1 beverage / month               Illicit drug use:None    Mood  Depressed mood: no  Any new anxiety/stressors:no  Any new deaths or losses? no  PHQ-9 SCORE 8/19/2015   PHQ-9 Total Score 0              Reviewed and updated as needed this visit by Provider  Tobacco  Allergies  Meds  Problems  Med Hx  Surg Hx  Fam Hx         Review of Systems   ROS COMP: Constitutional, HEENT, cardiovascular, pulmonary, GI, , musculoskeletal, neuro, skin, endocrine and psych systems are negative, except as otherwise noted.      Objective    BP (!) 139/94   Pulse 77   Temp 97.5  F (36.4  C) (Oral)   Ht 1.626 m (5' 4\")   Wt 64.5 kg (142 lb 1.6 oz)   LMP 08/16/2019   SpO2 100%   BMI 24.39 kg/m    Body mass index is 24.39 kg/m .  Physical Exam   GENERAL: healthy, alert and no distress  EYES: Eyes grossly normal to inspection, PERRL and conjunctivae and sclerae normal  HENT: ear canals and TM's normal, nose and mouth without ulcers or " lesions  NECK: no adenopathy, no asymmetry, masses, or scars and thyroid normal to palpation  RESP: lungs clear to auscultation - no rales, rhonchi or wheezes  BREAST: normal without masses, tenderness or nipple discharge and no palpable axillary masses or adenopathy  CV: regular rate and rhythm, normal S1 S2, no S3 or S4, no murmur, click or rub, no peripheral edema and peripheral pulses strong  ABDOMEN: soft, nontender, no hepatosplenomegaly, no masses and bowel sounds normal  MS: no gross musculoskeletal defects noted, no edema  SKIN: no suspicious lesions or rashes  NEURO: Normal strength and tone, mentation intact and speech normal  PSYCH: mentation appears normal, affect normal/bright          Assessment & Plan     1. Fatigue, unspecified type  Diagnostic orders as above, further plans depend on clinical status, trial of an over the counter multivitamin, increase exercise and physical activity  Mammogram and PAP reviewed and within normal limits.  NO smoking history.  - CBC with platelets and differential  - Comprehensive metabolic panel  - Erythrocyte sedimentation rate auto; Future  - Anti Nuclear Maeve IgG by IFA with Reflex  - Rheumatoid factor  - HIV Antigen Antibody Combo  - **Hepatitis C Screen Reflex to RNA FUTURE anytime           Return in about 2 weeks (around 9/11/2019), or if symptoms worsen or fail to improve.    Saniya Blandon MD  [facility]

## 2019-08-28 NOTE — PATIENT INSTRUCTIONS
Patient Education     Weakness with Uncertain Cause  Based on your exam today, the exact cause of your weakness is not certain. But your weakness does not seem to be a sign of a serious illness at this time. Keep an eye on your symptoms and get medical advice as instructed below.  Home care    Rest at home today. Don't over-exert yourself.    Take any medicine as prescribed.    For the next few days, drink extra fluids (unless your healthcare provider wants you to restrict fluids for other reasons). Don't skip meals.    Unless otherwise directed, continue to take any prescription medicines.    Contact your healthcare provider if you have any questions or concerns.  Follow-up care  Follow up with your healthcare provider, or as advised.  When to seek medical advice  Call your healthcare provider right away for any of the following:    Symptoms get worse    Symptoms don't start getting better within 2 days    Fever of 100.4  F (38  C) or higher, or as directed by your healthcare provider  Call 911  Call 911 for any of these:    Chest, arm, neck, jaw, or upper back pain    Trouble breathing    Numbness or weakness of the face, one arm, or one leg    Slurred speech, confusion, or trouble speaking, walking, or seeing    Blood in vomit or stool (black or red color)    Loss of consciousness    Severe headache  Date Last Reviewed: 10/1/2017    9946-3673 [employer]. [address] [place] [address], [place] [address]. All rights reserved. This information is not intended as a substitute for professional medical care. Always follow your healthcare professional's instructions.         RECHECK BP

## 2019-08-29 LAB
ANA SER QL IF: NEGATIVE
HCV AB SERPL QL IA: NONREACTIVE
HIV 1+2 AB+HIV1 P24 AG SERPL QL IA: NONREACTIVE
RHEUMATOID FACT SER NEPH-ACNC: <20 IU/ML (ref 0–20)
TSH SERPL DL<=0.005 MIU/L-ACNC: 2.03 MU/L (ref 0.4–4)

## 2019-09-27 ENCOUNTER — OFFICE VISIT (OUTPATIENT)
Dept: OBGYN | Facility: CLINIC | Age: 44
End: 2019-09-27
Payer: MEDICAID

## 2019-09-27 VITALS
DIASTOLIC BLOOD PRESSURE: 83 MMHG | HEART RATE: 69 BPM | BODY MASS INDEX: 24.51 KG/M2 | WEIGHT: 142.8 LBS | SYSTOLIC BLOOD PRESSURE: 126 MMHG

## 2019-09-27 DIAGNOSIS — N90.89 LABIAL LESION: Primary | ICD-10-CM

## 2019-09-27 PROCEDURE — 99213 OFFICE O/P EST LOW 20 MIN: CPT | Performed by: OBSTETRICS & GYNECOLOGY

## 2019-09-27 NOTE — PROGRESS NOTES
OB/GYN      NAME:  Sindhu Bennett  PCP:  MichaelSonia  MRN:  [id number]    Impression / Plan     44 year old  with:      ICD-10-CM    1. Labial lesion N90.89        Lesion is most consistent with a healing herpetic lesion.  Likely nonprimary first episode.  Discussed management options.  Antiviral treatment is not as effective at this stage.  Offered a prescription for future outbreaks, but patient declines.  She will contact me through my chart if she changes her mind.    Chief Complaint     Chief Complaint   Patient presents with     Vaginal Problem       HPI     Sindhu Bennett is a  44 year old female who is seen for labial lesion.  She noticed a lump on her left labia on Monday.  Her  has known HSV and he had an outbreak about the same time.  He usually is on suppressive therapy but not recently.  They are each other's only sexual partners. She has never had an outbreak.  Patient did not have prodromal symptoms.  The lesion was quite painful initially, but no longer painful.  She feels like it is healing.  No drainage.  No abnormal discharge.  Patient did not have any flulike symptoms during this episode.  No other concerns today.    Patient's last menstrual period was 2019.     Date of Last Pap Smear:   Lab Results   Component Value Date    PAP NIL 2018     Previous abnormal pap smear: No      Problem List     Patient Active Problem List    Diagnosis Date Noted     Environmental allergies 2019     Priority: Medium     Hypertension, goal below 140/90 2016     Priority: Medium     Third degree hemorrhoids 2015     Priority: Medium     S/P repair of coarctation of aorta 2015     Priority: Medium     HSV infection 2014     Priority: Medium     Her  has had penile herpes ('s only sexual partner has been wife)  Sindhu has not had herpes.            Medications     Current Outpatient Medications   Medication     aspirin  (ASA) 81 MG chewable tablet     cetirizine-pseudoePHEDrine ER (ZYRTEC-D) 5-120 MG 12 hr tablet     fluticasone (FLONASE) 50 MCG/ACT nasal spray     metoprolol succinate ER (TOPROL-XL) 50 MG 24 hr tablet     simvastatin (ZOCOR) 20 MG tablet     No current facility-administered medications for this visit.         Allergies     Allergies   Allergen Reactions     Penicillins Rash     Was a very red facial rash     Erythromycin      Severe vomiting     Levaquin [Levofloxacin]      Nitroglycerin      Doxycycline Rash     Toradol [Ketorolac Tromethamine]        ROS     A 6 organ review of systems was asked and the pertinent positives and negatives are listed in the HPI. All other organ systems can be considered negative.     Physical Exam   Vitals: /83   Pulse 69   Wt 64.8 kg (142 lb 12.8 oz)   LMP 09/12/2019   BMI 24.51 kg/m      General: Comfortable, no obvious distress  Psych: Alert and orientated x 3. Appropriate affect, good insight.   : Normal female external genitalia.  No lesions.  Urethral meatus normal.      Labs/Imaging       Labs were reviewed in Epic   .       15 minutes was spent with patient, more than 50% counseling and coordinating care    Nursing notes read and reviewed    Dianne Snell MD

## 2019-11-18 DIAGNOSIS — Z91.09 ENVIRONMENTAL ALLERGIES: ICD-10-CM

## 2019-11-18 NOTE — TELEPHONE ENCOUNTER
Zyrtec-D      Last Written Prescription Date:  4/5  Last Fill Quantity: 60,   # refills: 0  Last Office Visit: 8/28  Future Office visit:       Routing refill request to provider for review/approval because:  Drug not on the [facility], P or [facility] refill protocol or controlled substance

## 2019-11-20 RX ORDER — CETIRIZINE HCL/PSEUDOEPHEDRINE 5 MG-120MG
TABLET, EXTENDED RELEASE 12 HR ORAL
Qty: 60 TABLET | Refills: 0 | Status: SHIPPED | OUTPATIENT
Start: 2019-11-20 | End: 2021-10-27

## 2019-11-22 ENCOUNTER — OFFICE VISIT (OUTPATIENT)
Dept: PEDIATRICS | Facility: CLINIC | Age: 44
End: 2019-11-22
Payer: MEDICAID

## 2019-11-22 VITALS
BODY MASS INDEX: 24.49 KG/M2 | TEMPERATURE: 98.3 F | HEART RATE: 66 BPM | OXYGEN SATURATION: 96 % | SYSTOLIC BLOOD PRESSURE: 130 MMHG | DIASTOLIC BLOOD PRESSURE: 87 MMHG | WEIGHT: 142.7 LBS

## 2019-11-22 DIAGNOSIS — J01.90 ACUTE SINUSITIS WITH SYMPTOMS > 10 DAYS: Primary | ICD-10-CM

## 2019-11-22 DIAGNOSIS — R05.9 COUGH: ICD-10-CM

## 2019-11-22 PROCEDURE — 99213 OFFICE O/P EST LOW 20 MIN: CPT | Performed by: FAMILY MEDICINE

## 2019-11-22 RX ORDER — CODEINE PHOSPHATE AND GUAIFENESIN 10; 100 MG/5ML; MG/5ML
1-2 SOLUTION ORAL
Qty: 118 ML | Refills: 0 | Status: SHIPPED | OUTPATIENT
Start: 2019-11-22 | End: 2020-07-21

## 2019-11-22 RX ORDER — AZITHROMYCIN 500 MG/1
500 TABLET, FILM COATED ORAL DAILY
Qty: 3 TABLET | Refills: 0 | Status: SHIPPED | OUTPATIENT
Start: 2019-11-22 | End: 2019-11-25

## 2019-11-22 ASSESSMENT — PAIN SCALES - GENERAL: PAINLEVEL: EXTREME PAIN (8)

## 2019-11-22 NOTE — PROGRESS NOTES
Subjective     Sindhu Bennett is a 44 year old female who presents to clinic today for the following health issues:    HPI   RESPIRATORY SYMPTOMS      Duration: last wednesday    Description  nasal congestion, rhinorrhea, sore throat, cough (so hard will vomit), fever and hoarse voice    Severity: moderate    Accompanying signs and symptoms: None    History (predisposing factors):  none    Precipitating or alleviating factors: None    Therapies tried and outcome:  none        Patient Active Problem List   Diagnosis     HSV infection     S/P repair of coarctation of aorta     Hypertension, goal below 140/90     Environmental allergies     Third degree hemorrhoids     Past Surgical History:   Procedure Laterality Date     ANGIOPLASTY       APPENDECTOMY  2006     BIOPSY   &     when cyst were removed     C LAP OVARIAN CYSTOTOMY      4 different surgeries       Social History     Tobacco Use     Smoking status: Never Smoker     Smokeless tobacco: Never Used   Substance Use Topics     Alcohol use: Yes     Comment: occ.     Family History   Problem Relation Age of Onset     Hypertension Mother      Diabetes Father      Hypertension Father      Cancer - colorectal Maternal Grandfather      C.A.D. Maternal Grandfather      Diabetes Maternal Grandfather      Hypertension Maternal Grandfather      C.A.D. Maternal Uncle      Cerebrovascular Disease Maternal Uncle      Hypertension Maternal Uncle      Diabetes Maternal Uncle      Diabetes Maternal Grandmother      Multiple Sclerosis Maternal Grandmother      Hypertension Brother      Colon Cancer Paternal Grandfather      Coronary Artery Disease Paternal Uncle      Cervical Cancer Sister 46        stage 4 and now  .      Asthma No family hx of      Breast Cancer No family hx of      Prostate Cancer No family hx of      Thyroid Disease No family hx of      Genetic Disorder No family hx of          Current Outpatient Medications   Medication Sig Dispense  Refill     aspirin (ASA) 81 MG chewable tablet CHEW AND SWALLOW ONE TABLET BY MOUTH ONE TIME DAILY  90 tablet 3     azithromycin (ZITHROMAX) 500 MG tablet Take 1 tablet (500 mg) by mouth daily for 3 days 3 tablet 0     fluticasone (FLONASE) 50 MCG/ACT nasal spray Spray 2 sprays into both nostrils daily 48 g 3     guaiFENesin-codeine (ROBITUSSIN AC) 100-10 MG/5ML solution Take 5-10 mLs by mouth nightly as needed for cough 118 mL 0     metoprolol succinate ER (TOPROL-XL) 50 MG 24 hr tablet Take 1 tablet (50 mg) by mouth daily 90 tablet 3     simvastatin (ZOCOR) 20 MG tablet TAKE ONE TABLET BY MOUTH AT BEDTIME 90 tablet 3     ZYRTEC-D ALLERGY & CONGESTION 5-120 MG 12 hr tablet TAKE ONE TABLET BY MOUTH TWICE A DAY AS NEEDED FOR ALLERGIES 60 tablet 0     Allergies   Allergen Reactions     Penicillins Rash     Was a very red facial rash     Erythromycin      Severe vomiting     Levaquin [Levofloxacin]      Nitroglycerin      Doxycycline Rash     Toradol [Ketorolac Tromethamine]      Recent Labs   Lab Test 08/28/19  1454 04/25/19  0811 02/08/18  0739  10/12/17  1600 02/16/17  0747  06/06/16  1616  07/07/15   LDL  --  53 48  --   --  66   < >  --   --   --    HDL  --  44* 51  --   --  52   < >  --   --   --    TRIG  --  86 85  --   --  87   < >  --   --   --    ALT 17  --   --   --  23  --   --   --   --  37   CR 0.68 0.84 0.77   < > 0.80 0.80   < > 0.64   < > 0.53   GFRESTIMATED >90 86 83   < > 79 80   < > >90  Non  GFR Calc     < > >60   GFRESTBLACK >90 >90 >90   < > >90 >90  African American GFR Calc     < > >90   GFR Calc     < > >60   POTASSIUM 3.9 4.2 4.6   < > 4.2 3.9   < > 3.8   < >  --    TSH 2.03  --   --   --   --   --   --  2.29   < >  --     < > = values in this interval not displayed.      BP Readings from Last 3 Encounters:   11/22/19 130/87   09/27/19 126/83   08/28/19 128/82    Wt Readings from Last 3 Encounters:   11/22/19 64.7 kg (142 lb 11.2 oz)   09/27/19 64.8 kg (142 lb  12.8 oz)   08/28/19 64.5 kg (142 lb 1.6 oz)                    Reviewed and updated as needed this visit by Provider  Surg Hx  Fam Hx         Review of Systems   ROS COMP: Constitutional, HEENT, cardiovascular, pulmonary, GI, , musculoskeletal, neuro, skin, endocrine and psych systems are negative, except as otherwise noted.      Objective    /87   Pulse 66   Temp 98.3  F (36.8  C) (Oral)   Wt 64.7 kg (142 lb 11.2 oz)   LMP 11/08/2019   SpO2 96%   BMI 24.49 kg/m    Body mass index is 24.49 kg/m .  Physical Exam   GENERAL: healthy, alert and no distress  EYES: Eyes grossly normal to inspection, PERRL and conjunctivae and sclerae normal  HENT: normal cephalic/atraumatic, nose and mouth without ulcers or lesions, oropharynx clear, oral mucous membranes moist and sinuses: maxillary tenderness on bilateral  NECK: no adenopathy, no asymmetry, masses, or scars and thyroid normal to palpation  RESP: lungs clear to auscultation - no rales, rhonchi or wheezes  CV: regular rate and rhythm, normal S1 S2, no S3 or S4, no murmur, click or rub, no peripheral edema and peripheral pulses strong  ABDOMEN: soft, nontender, no hepatosplenomegaly, no masses and bowel sounds normal  MS: no gross musculoskeletal defects noted, no edema            Assessment & Plan     1. Acute sinusitis with symptoms > 10 days  Symptomatic therapy suggested: push fluids, rest, gargle warm salt water, use vaporizer or mist needed , use acetaminophen, ibuprofen as needed, apply heat to sinuses as needed and Return office visit if symptoms persist or worsen.   - guaiFENesin-codeine (ROBITUSSIN AC) 100-10 MG/5ML solution; Take 5-10 mLs by mouth nightly as needed for cough  Dispense: 118 mL; Refill: 0  - azithromycin (ZITHROMAX) 500 MG tablet; Take 1 tablet (500 mg) by mouth daily for 3 days  Dispense: 3 tablet; Refill: 0    2. Cough  - guaiFENesin-codeine (ROBITUSSIN AC) 100-10 MG/5ML solution; Take 5-10 mLs by mouth nightly as needed for  cough  Dispense: 118 mL; Refill: 0       See Patient Instructions    Return if symptoms worsen or fail to improve.    Saniya Blandon MD  [facility]

## 2019-11-22 NOTE — PATIENT INSTRUCTIONS
Patient Education     Acute Sinusitis    Acute sinusitis is irritation and swelling of the sinuses. It is usually caused by a viral infection after a common cold. Your doctor can help you find relief.  What is acute sinusitis?  Sinuses are air-filled spaces in the skull behind the face. They are kept moist and clean by a lining of mucosa. Things such as pollen, smoke, and chemical fumes can irritate the mucosa. It can then swell up. As a response to irritation, the mucosa makes more mucus and other fluids. Tiny hairlike cilia cover the mucosa. Cilia help carry mucus toward the opening of the sinus. Too much mucus may cause the cilia to stop working. This blocks the sinus opening. A buildup of fluid in the sinuses then causes pain and pressure. It can also encourage bacteria to grow in the sinuses.  Common symptoms of acute sinusitis  You may have:    Facial soreness pain    Headache    Fever    Fluid draining in the back of the throat (postnasal drip)    Congestion    Drainage that is thick and colored, instead of clear    Cough  Diagnosing acute sinusitis  Your doctor will ask about your symptoms and health history. He or she will look at your ear, nose, and throat. You usually won't need to have X-rays taken.    The doctor may take a sample of mucus to check for bacteria. If you have sinusitis that keeps coming back, you may need imaging tests such as X-rays or CAT scans. This will help your doctor check for a structural problem that may be causing the infection.  Treating acute sinusitis  Treatment is aimed at unblocking the sinus opening and helping the cilia work again. You may need to take antihistamine and decongestant medicine. These can reduce inflammation and decrease the amount of fluid your sinuses make. If you have a bacterial infection, you will need to take antibiotic medicine for 10 to 14 days. Take this medicine until it is gone, even if you feel better.  Date Last Reviewed: 10/1/2016    [id number]  [employer]. [address] [place] [address], [place] [address]. All rights reserved. This information is not intended as a substitute for professional medical care. Always follow your healthcare professional's instructions.

## 2020-03-02 ENCOUNTER — HEALTH MAINTENANCE LETTER (OUTPATIENT)
Age: 45
End: 2020-03-02

## 2020-03-10 DIAGNOSIS — Q25.1 COARCTATION OF AORTA: ICD-10-CM

## 2020-03-10 DIAGNOSIS — Z87.74 S/P REPAIR OF COARCTATION OF AORTA: ICD-10-CM

## 2020-03-10 RX ORDER — ASPIRIN 81 MG/1
TABLET, CHEWABLE ORAL
Qty: 90 TABLET | Refills: 1 | Status: SHIPPED | OUTPATIENT
Start: 2020-03-10 | End: 2020-08-13

## 2020-03-10 NOTE — TELEPHONE ENCOUNTER
[facility] Call Center    Phone Message    May a detailed message be left on voicemail: no     Reason for Call: Medication Refill Request    Has the patient contacted the pharmacy for the refill? Yes   Name of medication being requested: aspirin (ASA) 81 MG chewable tablet [1922] (Order [id number])     Provider who prescribed the medication: Corin Jenkins  Pharmacy: [facility]  Date medication is needed: ASAP         Action Taken: Message routed to:  Primary Care p [phone]    Travel Screening: Not Applicable

## 2020-05-15 ENCOUNTER — TELEPHONE (OUTPATIENT)
Dept: PEDIATRICS | Facility: CLINIC | Age: 45
End: 2020-05-15

## 2020-05-15 DIAGNOSIS — Z87.74 S/P REPAIR OF COARCTATION OF AORTA: ICD-10-CM

## 2020-05-15 DIAGNOSIS — I10 HYPERTENSION, GOAL BELOW 140/90: ICD-10-CM

## 2020-05-15 DIAGNOSIS — Q25.1 COARCTATION OF AORTA: ICD-10-CM

## 2020-05-18 RX ORDER — METOPROLOL SUCCINATE 50 MG/1
50 TABLET, EXTENDED RELEASE ORAL DAILY
Qty: 90 TABLET | Refills: 1 | Status: SHIPPED | OUTPATIENT
Start: 2020-05-18 | End: 2020-08-27

## 2020-05-18 RX ORDER — SIMVASTATIN 20 MG
20 TABLET ORAL AT BEDTIME
Qty: 90 TABLET | Refills: 0 | Status: SHIPPED | OUTPATIENT
Start: 2020-05-18 | End: 2020-08-18

## 2020-05-18 NOTE — TELEPHONE ENCOUNTER
Last Clinic Visit: 11/22/19, no upcoming visits scheduled, over due for LDL, 90 day milan of simvastatin provided, routed to clinic for follow up

## 2020-07-21 ENCOUNTER — OFFICE VISIT (OUTPATIENT)
Dept: PEDIATRICS | Facility: CLINIC | Age: 45
End: 2020-07-21
Payer: MEDICAID

## 2020-07-21 VITALS
TEMPERATURE: 97.8 F | SYSTOLIC BLOOD PRESSURE: 122 MMHG | HEIGHT: 64 IN | BODY MASS INDEX: 24.12 KG/M2 | WEIGHT: 141.3 LBS | DIASTOLIC BLOOD PRESSURE: 74 MMHG

## 2020-07-21 DIAGNOSIS — R35.0 URINE FREQUENCY: Primary | ICD-10-CM

## 2020-07-21 DIAGNOSIS — R10.2 PELVIC PAIN IN FEMALE: ICD-10-CM

## 2020-07-21 DIAGNOSIS — Z87.42 H/O ENDOMETRITIS: ICD-10-CM

## 2020-07-21 LAB
ALBUMIN UR-MCNC: NEGATIVE MG/DL
ANION GAP SERPL CALCULATED.3IONS-SCNC: 3 MMOL/L (ref 3–14)
APPEARANCE UR: CLEAR
BASOPHILS # BLD AUTO: 0.1 10E9/L (ref 0–0.2)
BASOPHILS NFR BLD AUTO: 0.7 %
BILIRUB UR QL STRIP: NEGATIVE
BUN SERPL-MCNC: 13 MG/DL (ref 7–30)
CALCIUM SERPL-MCNC: 9.5 MG/DL (ref 8.5–10.1)
CHLORIDE SERPL-SCNC: 109 MMOL/L (ref 94–109)
CO2 SERPL-SCNC: 28 MMOL/L (ref 20–32)
COLOR UR AUTO: YELLOW
CREAT SERPL-MCNC: 0.83 MG/DL (ref 0.52–1.04)
DIFFERENTIAL METHOD BLD: NORMAL
EOSINOPHIL # BLD AUTO: 0.3 10E9/L (ref 0–0.7)
EOSINOPHIL NFR BLD AUTO: 3.9 %
ERYTHROCYTE [DISTWIDTH] IN BLOOD BY AUTOMATED COUNT: 12.9 % (ref 10–15)
GFR SERPL CREATININE-BSD FRML MDRD: 86 ML/MIN/{1.73_M2}
GLUCOSE SERPL-MCNC: 86 MG/DL (ref 70–99)
GLUCOSE UR STRIP-MCNC: NEGATIVE MG/DL
HCT VFR BLD AUTO: 42.1 % (ref 35–47)
HGB BLD-MCNC: 14 G/DL (ref 11.7–15.7)
HGB UR QL STRIP: NEGATIVE
IMM GRANULOCYTES # BLD: 0 10E9/L (ref 0–0.4)
IMM GRANULOCYTES NFR BLD: 0.2 %
KETONES UR STRIP-MCNC: NEGATIVE MG/DL
LEUKOCYTE ESTERASE UR QL STRIP: NEGATIVE
LYMPHOCYTES # BLD AUTO: 2.2 10E9/L (ref 0.8–5.3)
LYMPHOCYTES NFR BLD AUTO: 27.5 %
MCH RBC QN AUTO: 28.7 PG (ref 26.5–33)
MCHC RBC AUTO-ENTMCNC: 33.3 G/DL (ref 31.5–36.5)
MCV RBC AUTO: 86 FL (ref 78–100)
MONOCYTES # BLD AUTO: 0.6 10E9/L (ref 0–1.3)
MONOCYTES NFR BLD AUTO: 8 %
NEUTROPHILS # BLD AUTO: 4.8 10E9/L (ref 1.6–8.3)
NEUTROPHILS NFR BLD AUTO: 59.7 %
NITRATE UR QL: NEGATIVE
NON-SQ EPI CELLS #/AREA URNS LPF: NORMAL /LPF
PH UR STRIP: 7 PH (ref 5–7)
PLATELET # BLD AUTO: 217 10E9/L (ref 150–450)
POTASSIUM SERPL-SCNC: 4.6 MMOL/L (ref 3.4–5.3)
RBC # BLD AUTO: 4.87 10E12/L (ref 3.8–5.2)
RBC #/AREA URNS AUTO: NORMAL /HPF
SODIUM SERPL-SCNC: 140 MMOL/L (ref 133–144)
SOURCE: NORMAL
SP GR UR STRIP: 1.02 (ref 1–1.03)
UROBILINOGEN UR STRIP-MCNC: NORMAL MG/DL (ref 0–2)
WBC # BLD AUTO: 8 10E9/L (ref 4–11)
WBC #/AREA URNS AUTO: NORMAL /HPF

## 2020-07-21 PROCEDURE — 99214 OFFICE O/P EST MOD 30 MIN: CPT | Performed by: INTERNAL MEDICINE

## 2020-07-21 PROCEDURE — 36415 COLL VENOUS BLD VENIPUNCTURE: CPT | Performed by: INTERNAL MEDICINE

## 2020-07-21 PROCEDURE — 80048 BASIC METABOLIC PNL TOTAL CA: CPT | Performed by: INTERNAL MEDICINE

## 2020-07-21 PROCEDURE — 85025 COMPLETE CBC W/AUTO DIFF WBC: CPT | Performed by: INTERNAL MEDICINE

## 2020-07-21 PROCEDURE — 81001 URINALYSIS AUTO W/SCOPE: CPT | Performed by: INTERNAL MEDICINE

## 2020-07-21 ASSESSMENT — MIFFLIN-ST. JEOR: SCORE: 1279.93

## 2020-07-21 NOTE — PROGRESS NOTES
Subjective     Sindhu Bennett is a 44 year old female who presents to clinic today for the following health issues:    HPI     44-year-old young lady presents with a one-week history of urinary frequency and urgency.  She feels like that she does not quite empty her bladder.  The quantity of urine is also small.  No fever or chills.  She has felt a lower abdominal or pelvic pain and some low back pain.  No change in bowel habits.  Indicates her menstrual periods are regular.  She has a history of endometriosis in the past that has been bothering her for quite a while.  She was on hormonal therapy for it one time.  She had a laparoscopic ablations performed as well as ovarian cystotomy.  Past medical history significant for hypertension and dyslipidemia.  Has history of coarctation of aorta which was  stented in the past.    URINARY TRACT SYMPTOMS  Onset: 1 week ago    Description:   Painful urination (Dysuria): YES           Frequency: YES  Blood in urine (Hematuria): no   Delay in urine (Hesitency): YES    Intensity: moderate    Progression of Symptoms:  worsening    Accompanying Signs & Symptoms:  Fever/chills: no   Flank pain YES- kidney area  Nausea and vomiting: no   Any vaginal symptoms: none and vaginal itching  Abdominal/Pelvic Pain: YES    History:   History of frequent UTI's: no   History of kidney stones: no   Sexually Active: YES  Possibility of pregnancy: No    Precipitating factors:   none    Therapies Tried and outcome:  Increase fluid intake, tylenol, advil, but nothing helps       Patient Active Problem List   Diagnosis     HSV infection     S/P repair of coarctation of aorta     Hypertension, goal below 140/90     Environmental allergies     Third degree hemorrhoids     Past Surgical History:   Procedure Laterality Date     ANGIOPLASTY       APPENDECTOMY  2006     BIOPSY  '96 & '98    when cyst were removed     C LAP OVARIAN CYSTOTOMY      4 different surgeries       Social History      Tobacco Use     Smoking status: Never Smoker     Smokeless tobacco: Never Used   Substance Use Topics     Alcohol use: Yes     Comment: occ.     Family History   Problem Relation Age of Onset     Hypertension Mother      Diabetes Father      Hypertension Father      Cancer - colorectal Maternal Grandfather      C.A.D. Maternal Grandfather      Diabetes Maternal Grandfather      Hypertension Maternal Grandfather      C.A.D. Maternal Uncle      Cerebrovascular Disease Maternal Uncle      Hypertension Maternal Uncle      Diabetes Maternal Uncle      Diabetes Maternal Grandmother      Multiple Sclerosis Maternal Grandmother      Hypertension Brother      Colon Cancer Paternal Grandfather      Coronary Artery Disease Paternal Uncle      Cervical Cancer Sister 46        stage 4 and now  .      Asthma No family hx of      Breast Cancer No family hx of      Prostate Cancer No family hx of      Thyroid Disease No family hx of      Genetic Disorder No family hx of          Current Outpatient Medications   Medication Sig Dispense Refill     aspirin (ASA) 81 MG chewable tablet CHEW AND SWALLOW ONE TABLET BY MOUTH ONE TIME DAILY 90 tablet 1     fluticasone (FLONASE) 50 MCG/ACT nasal spray Spray 2 sprays into both nostrils daily 48 g 3     metoprolol succinate ER (TOPROL-XL) 50 MG 24 hr tablet Take 1 tablet (50 mg) by mouth daily 90 tablet 1     simvastatin (ZOCOR) 20 MG tablet Take 1 tablet (20 mg) by mouth At Bedtime TAKE ONE TABLET BY MOUTH AT BEDTIME 90 tablet 0     ZYRTEC-D ALLERGY & CONGESTION 5-120 MG 12 hr tablet TAKE ONE TABLET BY MOUTH TWICE A DAY AS NEEDED FOR ALLERGIES (Patient taking differently: Prn) 60 tablet 0     Allergies   Allergen Reactions     Penicillins Rash     Was a very red facial rash     Erythromycin      Severe vomiting     Levaquin [Levofloxacin]      Nitroglycerin      Doxycycline Rash     Toradol [Ketorolac Tromethamine]      BP Readings from Last 3 Encounters:   20 122/74    11/22/19 130/87   09/27/19 126/83    Wt Readings from Last 3 Encounters:   07/21/20 64.1 kg (141 lb 4.8 oz)   11/22/19 64.7 kg (142 lb 11.2 oz)   09/27/19 64.8 kg (142 lb 12.8 oz)                    Reviewed and updated as needed this visit by Provider         Review of Systems   Constitutional, HEENT, cardiovascular, pulmonary, GI, , musculoskeletal, neuro, skin, endocrine and psych systems are negative, except as otherwise noted.      Objective    There were no vitals taken for this visit.  There is no height or weight on file to calculate BMI.  Physical Exam   GENERAL: healthy, alert and no distress  CV: regular rate and rhythm, normal S1 S2, no S3 or S4, no murmur, click or rub, no peripheral edema and peripheral pulses strong  ABDOMEN: tenderness suprapubic, left and right lower quadrents, no organomegaly or masses, bowel sounds normal and no palpable or pulsatile masses    Diagnostic Test Results:  Labs reviewed in Epic    Results for orders placed or performed in visit on 07/21/20 (from the past 24 hour(s))   UA with Microscopic reflex to Culture ([place]; [facility])    Specimen: Urine   Result Value Ref Range    Color Urine Yellow     Appearance Urine Clear     Glucose Urine Negative NEG^Negative mg/dL    Bilirubin Urine Negative NEG^Negative    Ketones Urine Negative NEG^Negative mg/dL    Specific Gravity Urine 1.016 1.003 - 1.035    Blood Urine Negative NEG^Negative    pH Urine 7.0 5.0 - 7.0 pH    Protein Albumin Urine Negative NEG^Negative mg/dL    Urobilinogen mg/dL Normal 0.0 - 2.0 mg/dL    Nitrite Urine Negative NEG^Negative    Leukocyte Esterase Urine Negative NEG^Negative    Source Clean catch urine     WBC Urine 0 - 5 OTO5^0 - 5 /HPF    RBC Urine O - 2 OTO2^O - 2 /HPF    Squamous Epithelial /LPF Urine Few FEW^Few /LPF           Assessment & Plan     1.  Urinary frequency and urgency without evidence of bladder infection.  A UA is negative.  This will need to be further investigated.   Possibly could be related to recurrent cirrhosis.  2.  Pelvic pain in female.  There is definite tenderness on palpation.  Differential diagnosis includes endometriosis since she has history of it in the past.  PID possible.  Recommend further investigation with ultrasound of the pelvis as well as CBC and a BMP.  3.  History of endometriosis.    I will get back to the patient with the above studies and recommendation.       Wojciech Collier MD  [facility]

## 2020-07-22 ENCOUNTER — ANCILLARY PROCEDURE (OUTPATIENT)
Dept: ULTRASOUND IMAGING | Facility: CLINIC | Age: 45
End: 2020-07-22
Attending: INTERNAL MEDICINE
Payer: MEDICAID

## 2020-07-22 DIAGNOSIS — Z87.42 H/O ENDOMETRITIS: ICD-10-CM

## 2020-07-22 DIAGNOSIS — R10.2 PELVIC PAIN IN FEMALE: Primary | ICD-10-CM

## 2020-07-22 PROCEDURE — 76856 US EXAM PELVIC COMPLETE: CPT | Mod: 59 | Performed by: STUDENT IN AN ORGANIZED HEALTH CARE EDUCATION/TRAINING PROGRAM

## 2020-07-22 PROCEDURE — 76830 TRANSVAGINAL US NON-OB: CPT | Performed by: STUDENT IN AN ORGANIZED HEALTH CARE EDUCATION/TRAINING PROGRAM

## 2020-08-09 DIAGNOSIS — Z87.74 S/P REPAIR OF COARCTATION OF AORTA: ICD-10-CM

## 2020-08-09 DIAGNOSIS — Q25.1 COARCTATION OF AORTA: ICD-10-CM

## 2020-08-13 RX ORDER — ASPIRIN 81 MG/1
TABLET, CHEWABLE ORAL
Qty: 90 TABLET | Refills: 3 | Status: SHIPPED | OUTPATIENT
Start: 2020-08-13 | End: 2021-07-07

## 2020-08-13 NOTE — TELEPHONE ENCOUNTER
Aspirin Oral Tablet Chewable 81 MG    Last Written Prescription Date:  3/10/2020  Last Fill Quantity: 90,   # refills: 1  Last Office Visit : 7/21/2020  Future Office visit:  8/27/2020  90 Tabs, 3 Refills sent to pharm 8/13/2020      Karla Chambers RN  Central Triage Red Flags/Med Refills  Warnings Override History for aspirin (ASA) 81 MG chewable tablet [[id number]]     Overridden by Sindhu Jenkins RN on Mar 10, 2020 4:14 PM    Allergy/Contraindication    1. KETOROLAC TROMETHAMINE [Level: Cross-sensitive Class Match]

## 2020-08-17 ENCOUNTER — MYC MEDICAL ADVICE (OUTPATIENT)
Dept: PEDIATRICS | Facility: CLINIC | Age: 45
End: 2020-08-17

## 2020-08-17 DIAGNOSIS — Z87.74 S/P REPAIR OF COARCTATION OF AORTA: ICD-10-CM

## 2020-08-17 DIAGNOSIS — Q25.1 COARCTATION OF AORTA: ICD-10-CM

## 2020-08-18 RX ORDER — SIMVASTATIN 20 MG
20 TABLET ORAL AT BEDTIME
Qty: 90 TABLET | Refills: 0 | Status: SHIPPED | OUTPATIENT
Start: 2020-08-18 | End: 2020-08-27

## 2020-08-18 NOTE — TELEPHONE ENCOUNTER
Simvastatin  Last Written Prescription Date:  5/18/2020  Last Fill Quantity: 90,  # refills: 0   Last office visit: 7/21/2020 with prescribing provider:  Dr. Rodriguez   Future Office Visit:   Next 5 appointments (look out 90 days)    Aug 27, 2020  8:30 AM CDT  PHYSICAL with Sonia Rodriguez MD PhD  [facility] ([facility] [address] [place] [id number] [phone]         Routing refill request to provider for review/approval because:  Pt needs rx refill today. Mallorie Mendez LPN

## 2020-08-19 DIAGNOSIS — Q25.1 COARCTATION OF AORTA: ICD-10-CM

## 2020-08-19 DIAGNOSIS — Z87.74 S/P REPAIR OF COARCTATION OF AORTA: ICD-10-CM

## 2020-08-22 DIAGNOSIS — J30.89 PERENNIAL ALLERGIC RHINITIS: ICD-10-CM

## 2020-08-24 ENCOUNTER — DOCUMENTATION ONLY (OUTPATIENT)
Dept: LAB | Facility: CLINIC | Age: 45
End: 2020-08-24

## 2020-08-24 DIAGNOSIS — Z00.00 ROUTINE HISTORY AND PHYSICAL EXAMINATION OF ADULT: Primary | ICD-10-CM

## 2020-08-24 DIAGNOSIS — I10 HYPERTENSION, GOAL BELOW 140/90: ICD-10-CM

## 2020-08-24 RX ORDER — SIMVASTATIN 20 MG
TABLET ORAL
Qty: 90 TABLET | Refills: 0 | OUTPATIENT
Start: 2020-08-24

## 2020-08-27 ENCOUNTER — OFFICE VISIT (OUTPATIENT)
Dept: PEDIATRICS | Facility: CLINIC | Age: 45
End: 2020-08-27
Payer: MEDICAID

## 2020-08-27 VITALS
TEMPERATURE: 98.2 F | BODY MASS INDEX: 24.24 KG/M2 | HEART RATE: 67 BPM | HEIGHT: 64 IN | WEIGHT: 142 LBS | SYSTOLIC BLOOD PRESSURE: 138 MMHG | OXYGEN SATURATION: 100 % | DIASTOLIC BLOOD PRESSURE: 85 MMHG

## 2020-08-27 DIAGNOSIS — I10 HYPERTENSION, GOAL BELOW 140/90: ICD-10-CM

## 2020-08-27 DIAGNOSIS — Z87.74 S/P REPAIR OF COARCTATION OF AORTA: ICD-10-CM

## 2020-08-27 DIAGNOSIS — Z23 NEED FOR PROPHYLACTIC VACCINATION AND INOCULATION AGAINST INFLUENZA: ICD-10-CM

## 2020-08-27 DIAGNOSIS — Q25.1 COARCTATION OF AORTA: ICD-10-CM

## 2020-08-27 DIAGNOSIS — J30.89 PERENNIAL ALLERGIC RHINITIS: ICD-10-CM

## 2020-08-27 DIAGNOSIS — Z00.00 ROUTINE GENERAL MEDICAL EXAMINATION AT A HEALTH CARE FACILITY: Primary | ICD-10-CM

## 2020-08-27 DIAGNOSIS — R53.83 FATIGUE, UNSPECIFIED TYPE: ICD-10-CM

## 2020-08-27 DIAGNOSIS — Z00.00 ROUTINE HISTORY AND PHYSICAL EXAMINATION OF ADULT: ICD-10-CM

## 2020-08-27 LAB
CHOLEST SERPL-MCNC: 127 MG/DL
CREAT UR-MCNC: 56 MG/DL
ERYTHROCYTE [SEDIMENTATION RATE] IN BLOOD BY WESTERGREN METHOD: 9 MM/H (ref 0–20)
HDLC SERPL-MCNC: 53 MG/DL
LDLC SERPL CALC-MCNC: 57 MG/DL
MICROALBUMIN UR-MCNC: <5 MG/L
MICROALBUMIN/CREAT UR: NORMAL MG/G CR (ref 0–25)
NONHDLC SERPL-MCNC: 74 MG/DL
TRIGL SERPL-MCNC: 84 MG/DL

## 2020-08-27 PROCEDURE — 82043 UR ALBUMIN QUANTITATIVE: CPT | Performed by: INTERNAL MEDICINE

## 2020-08-27 PROCEDURE — 90471 IMMUNIZATION ADMIN: CPT | Performed by: INTERNAL MEDICINE

## 2020-08-27 PROCEDURE — 36415 COLL VENOUS BLD VENIPUNCTURE: CPT | Performed by: INTERNAL MEDICINE

## 2020-08-27 PROCEDURE — 80061 LIPID PANEL: CPT | Performed by: INTERNAL MEDICINE

## 2020-08-27 PROCEDURE — 99396 PREV VISIT EST AGE 40-64: CPT | Mod: 25 | Performed by: INTERNAL MEDICINE

## 2020-08-27 PROCEDURE — 90686 IIV4 VACC NO PRSV 0.5 ML IM: CPT | Performed by: INTERNAL MEDICINE

## 2020-08-27 PROCEDURE — 85652 RBC SED RATE AUTOMATED: CPT | Performed by: INTERNAL MEDICINE

## 2020-08-27 RX ORDER — METOPROLOL SUCCINATE 50 MG/1
50 TABLET, EXTENDED RELEASE ORAL DAILY
Qty: 90 TABLET | Refills: 1 | Status: SHIPPED | OUTPATIENT
Start: 2020-08-27 | End: 2021-06-09

## 2020-08-27 RX ORDER — FLUTICASONE PROPIONATE 50 MCG
2 SPRAY, SUSPENSION (ML) NASAL DAILY
Qty: 48 G | Refills: 3 | Status: SHIPPED | OUTPATIENT
Start: 2020-08-27 | End: 2021-10-27

## 2020-08-27 RX ORDER — SIMVASTATIN 20 MG
20 TABLET ORAL AT BEDTIME
Qty: 90 TABLET | Refills: 2 | Status: SHIPPED | OUTPATIENT
Start: 2020-08-27 | End: 2021-09-17

## 2020-08-27 RX ORDER — FLUTICASONE PROPIONATE 50 MCG
2 SPRAY, SUSPENSION (ML) NASAL DAILY
Qty: 48 G | Refills: 0 | Status: SHIPPED | OUTPATIENT
Start: 2020-08-27 | End: 2020-08-27

## 2020-08-27 ASSESSMENT — MIFFLIN-ST. JEOR: SCORE: 1279.11

## 2020-08-27 NOTE — PROGRESS NOTES
SUBJECTIVE:   CC: Sindhu Bennett is an 45 year old woman who presents for preventive health visit.     Healthy Habits:    Do you get at least three servings of calcium containing foods daily (dairy, green leafy vegetables, etc.)? yes    Amount of exercise or daily activities, outside of work: 5 day(s) per week    Problems taking medications regularly No    Medication side effects: No    Have you had an eye exam in the past two years? yes    Do you see a dentist twice per year? yes    Do you have sleep apnea, excessive snoring or daytime drowsiness?no    HPI: doing well. No concerns.   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 Will be home schooling her 2 younger kids, both have autism. Oldest in college now.       Today's PHQ-2 Score:   PHQ-2 ( 1999 Pfizer) 8/27/2020 7/21/2020   Q1: Little interest or pleasure in doing things 0 0   Q2: Feeling down, depressed or hopeless 0 0   PHQ-2 Score 0 0       Abuse: Current or Past(Physical, Sexual or Emotional)- No  Do you feel safe in your environment? Yes        Social History     Tobacco Use     Smoking status: Never Smoker     Smokeless tobacco: Never Used   Substance Use Topics     Alcohol use: Yes     Comment: occ.     If you drink alcohol do you typically have >3 drinks per day or >7 drinks per week? No                     Reviewed orders with patient.  Reviewed health maintenance and updated orders accordingly - Yes  Labs reviewed in [facility]    Mammogram Screening: Patient under age 50, mutual decision reflected in health maintenance.      Pertinent mammograms are reviewed under the imaging tab.  History of abnormal Pap smear: NO - age 30-65 PAP every 5 years with negative HPV co-testing recommended  PAP / HPV Latest Ref Rng & Units 2/8/2018 12/31/2014   PAP - NIL NIL   HPV 16 DNA NEG:Negative Negative -   HPV 18 DNA NEG:Negative Negative -   OTHER HR HPV NEG:Negative Negative -     Reviewed and updated as needed this visit by clinical staff  Tobacco   "Allergies  Meds  Med Hx  Surg Hx  Fam Hx  Soc Hx        Reviewed and updated as needed this visit by Provider            ROS:  CONSTITUTIONAL: NEGATIVE for fever, chills, change in weight  INTEGUMENTARU/SKIN: NEGATIVE for worrisome rashes, moles or lesions  EYES: NEGATIVE for vision changes or irritation  ENT: NEGATIVE for ear, mouth and throat problems  RESP: NEGATIVE for significant cough or SOB  BREAST: NEGATIVE for masses, tenderness or discharge  CV: NEGATIVE for chest pain, palpitations or peripheral edema  GI: NEGATIVE for nausea, abdominal pain, heartburn, or change in bowel habits  : NEGATIVE for unusual urinary or vaginal symptoms. Periods are regular.  MUSCULOSKELETAL: NEGATIVE for significant arthralgias or myalgia  NEURO: NEGATIVE for weakness, dizziness or paresthesias  PSYCHIATRIC: NEGATIVE for changes in mood or affect    OBJECTIVE:   /85   Pulse 67   Temp 98.2  F (36.8  C) (Temporal)   Ht 1.626 m (5' 4\")   Wt 64.4 kg (142 lb)   SpO2 100%   BMI 24.37 kg/m    EXAM:  GENERAL: healthy, alert and no distress  EYES: Eyes grossly normal to inspection, PERRL and conjunctivae and sclerae normal  HENT: ear canals and TM's normal, nose and mouth without ulcers or lesions  NECK: no adenopathy, no asymmetry, masses, or scars and thyroid normal to palpation  RESP: lungs clear to auscultation - no rales, rhonchi or wheezes  BREAST: normal without masses, tenderness or nipple discharge and no palpable axillary masses or adenopathy  CV: regular rate and rhythm, normal S1 S2, no S3 or S4, no murmur, click or rub, no peripheral edema and peripheral pulses strong  ABDOMEN: soft, nontender, no hepatosplenomegaly, no masses and bowel sounds normal  MS: no gross musculoskeletal defects noted, no edema  SKIN: no suspicious lesions or rashes  NEURO: Normal strength and tone, mentation intact and speech normal  PSYCH: mentation appears normal, affect normal/bright    Diagnostic Test Results:  Labs reviewed " "in Epic  Results for orders placed or performed in visit on 08/27/20   Albumin Random Urine Quantitative with Creat Ratio     Status: None   Result Value Ref Range    Creatinine Urine 56 mg/dL    Albumin Urine mg/L <5 mg/L    Albumin Urine mg/g Cr Unable to calculate due to low value 0 - 25 mg/g Cr   Lipid panel reflex to direct LDL Fasting     Status: None   Result Value Ref Range    Cholesterol 127 <200 mg/dL    Triglycerides 84 <150 mg/dL    HDL Cholesterol 53 >49 mg/dL    LDL Cholesterol Calculated 57 <100 mg/dL    Non HDL Cholesterol 74 <130 mg/dL   Erythrocyte sedimentation rate auto     Status: None   Result Value Ref Range    Sed Rate 9 0 - 20 mm/h       ASSESSMENT/PLAN:       ICD-10-CM    1. Routine general medical examination at a health care facility  Z00.00    2. Perennial allergic rhinitis  J30.89 fluticasone (FLONASE) 50 MCG/ACT nasal spray   3. Need for prophylactic vaccination and inoculation against influenza  Z23 INFLUENZA VACCINE IM > 6 MONTHS VALENT IIV4 [78896]     Vaccine Administration, Initial [64166]   4. Hypertension, goal below 140/90  I10 metoprolol succinate ER (TOPROL-XL) 50 MG 24 hr tablet   5. S/P repair of coarctation of aorta  Z87.74 simvastatin (ZOCOR) 20 MG tablet   6. Coarctation of aorta  Q25.1 simvastatin (ZOCOR) 20 MG tablet     -- stable chronic health issues. Medications refilled.      COUNSELING:   Reviewed preventive health counseling, as reflected in patient instructions    Estimated body mass index is 24.37 kg/m  as calculated from the following:    Height as of this encounter: 1.626 m (5' 4\").    Weight as of this encounter: 64.4 kg (142 lb).        She reports that she has never smoked. She has never used smokeless tobacco.      Counseling Resources:  ATP IV Guidelines  Pooled Cohorts Equation Calculator  Breast Cancer Risk Calculator  BRCA-Related Cancer Risk Assessment: FHS-7 Tool  FRAX Risk Assessment  ICSI Preventive Guidelines  Dietary Guidelines for Americans, " 2010  USDA's MyPlate  ASA Prophylaxis  Lung CA Screening    Sonia Rodriguez MD PhD  [facility]

## 2020-08-28 NOTE — RESULT ENCOUNTER NOTE
Dear Sindhu,   Your recent test result are within acceptable range or at baseline. Please continue with your current plan of care.       Please call or Mychart to our office if you have further questions.     Sonia Rodriguez MD-PhD

## 2020-10-12 ENCOUNTER — TELEPHONE (OUTPATIENT)
Dept: PEDIATRICS | Facility: CLINIC | Age: 45
End: 2020-10-12

## 2020-10-12 DIAGNOSIS — U07.1 COVID-19: Primary | ICD-10-CM

## 2020-10-12 NOTE — TELEPHONE ENCOUNTER
The patient and the family members are asymptomatic other than this patient said she has itchy eyes from allergies.       They are concerned and just wanted to make sure they were not contagious.      The patient's  and two children have PCR  orders placed.    Apple Mccarty RN, [facility]

## 2020-10-12 NOTE — TELEPHONE ENCOUNTER
Patient is in the background while speaking to the . The family was exposed to someone on 10/6/2020 that was recently diagnosed with Covid.      Routing to Provider team to please advise if orders can be placed.     The family would all like to be tested in the same car    Apple Mccarty RN, [facility]

## 2020-10-12 NOTE — TELEPHONE ENCOUNTER
Called patient and gave message the order was placed.    Apple Mccarty RN, [facility]

## 2020-10-12 NOTE — TELEPHONE ENCOUNTER
So per CDC guidelines there do not all need to be tested unless one person develops symptoms. Patient and who are needing orders and Have anyone in their household developed symptoms?

## 2020-10-14 DIAGNOSIS — U07.1 COVID-19: ICD-10-CM

## 2020-10-14 PROCEDURE — U0003 INFECTIOUS AGENT DETECTION BY NUCLEIC ACID (DNA OR RNA); SEVERE ACUTE RESPIRATORY SYNDROME CORONAVIRUS 2 (SARS-COV-2) (CORONAVIRUS DISEASE [COVID-19]), AMPLIFIED PROBE TECHNIQUE, MAKING USE OF HIGH THROUGHPUT TECHNOLOGIES AS DESCRIBED BY CMS-2020-01-R: HCPCS | Performed by: PATHOLOGY

## 2020-10-16 LAB
SARS-COV-2 RNA SPEC QL NAA+PROBE: NOT DETECTED
SPECIMEN SOURCE: NORMAL

## 2020-11-28 ENCOUNTER — MYC REFILL (OUTPATIENT)
Dept: PEDIATRICS | Facility: CLINIC | Age: 45
End: 2020-11-28

## 2020-11-28 DIAGNOSIS — Q25.1 COARCTATION OF AORTA: ICD-10-CM

## 2020-11-28 DIAGNOSIS — I10 HYPERTENSION, GOAL BELOW 140/90: ICD-10-CM

## 2020-11-28 DIAGNOSIS — Z87.74 S/P REPAIR OF COARCTATION OF AORTA: ICD-10-CM

## 2020-11-28 RX ORDER — SIMVASTATIN 20 MG
20 TABLET ORAL AT BEDTIME
Qty: 90 TABLET | Refills: 2 | Status: CANCELLED | OUTPATIENT
Start: 2020-11-28

## 2020-11-28 RX ORDER — METOPROLOL SUCCINATE 50 MG/1
50 TABLET, EXTENDED RELEASE ORAL DAILY
Qty: 90 TABLET | Refills: 1 | Status: CANCELLED | OUTPATIENT
Start: 2020-11-28

## 2021-02-28 ENCOUNTER — HEALTH MAINTENANCE LETTER (OUTPATIENT)
Age: 46
End: 2021-02-28

## 2021-05-03 ENCOUNTER — IMMUNIZATION (OUTPATIENT)
Dept: PEDIATRICS | Facility: CLINIC | Age: 46
End: 2021-05-03
Payer: MEDICAID

## 2021-05-03 PROCEDURE — 0001A PR COVID VAC PFIZER DIL RECON 30 MCG/0.3 ML IM: CPT

## 2021-05-03 PROCEDURE — 91300 PR COVID VAC PFIZER DIL RECON 30 MCG/0.3 ML IM: CPT

## 2021-05-24 ENCOUNTER — IMMUNIZATION (OUTPATIENT)
Dept: PEDIATRICS | Facility: CLINIC | Age: 46
End: 2021-05-24
Attending: INTERNAL MEDICINE
Payer: MEDICAID

## 2021-05-24 PROCEDURE — 0002A PR COVID VAC PFIZER DIL RECON 30 MCG/0.3 ML IM: CPT

## 2021-05-24 PROCEDURE — 91300 PR COVID VAC PFIZER DIL RECON 30 MCG/0.3 ML IM: CPT

## 2021-06-09 ENCOUNTER — MYC MEDICAL ADVICE (OUTPATIENT)
Dept: FAMILY MEDICINE | Facility: CLINIC | Age: 46
End: 2021-06-09

## 2021-06-09 DIAGNOSIS — I10 HYPERTENSION, GOAL BELOW 140/90: ICD-10-CM

## 2021-09-14 ENCOUNTER — MYC REFILL (OUTPATIENT)
Dept: FAMILY MEDICINE | Facility: CLINIC | Age: 46
End: 2021-09-14

## 2021-09-14 DIAGNOSIS — E78.5 HYPERLIPIDEMIA LDL GOAL <100: Primary | ICD-10-CM

## 2021-09-14 DIAGNOSIS — Q25.1 COARCTATION OF AORTA: ICD-10-CM

## 2021-09-14 DIAGNOSIS — Z87.74 S/P REPAIR OF COARCTATION OF AORTA: ICD-10-CM

## 2021-09-14 DIAGNOSIS — I10 HYPERTENSION, GOAL BELOW 140/90: ICD-10-CM

## 2021-09-14 RX ORDER — METOPROLOL SUCCINATE 50 MG/1
50 TABLET, EXTENDED RELEASE ORAL DAILY
Qty: 90 TABLET | Refills: 0 | Status: CANCELLED | OUTPATIENT
Start: 2021-09-14

## 2021-09-14 RX ORDER — ASPIRIN 81 MG/1
TABLET, CHEWABLE ORAL
Qty: 90 TABLET | Refills: 0 | Status: CANCELLED | OUTPATIENT
Start: 2021-09-14

## 2021-09-15 RX ORDER — METOPROLOL SUCCINATE 50 MG/1
50 TABLET, EXTENDED RELEASE ORAL DAILY
Qty: 90 TABLET | Refills: 0 | OUTPATIENT
Start: 2021-09-15

## 2021-09-15 NOTE — TELEPHONE ENCOUNTER
Routing refill request to provider for review/approval because:  Labs not current:  Lipids  Ynes Garcia BSN, RN

## 2021-09-16 ENCOUNTER — TELEPHONE (OUTPATIENT)
Dept: FAMILY MEDICINE | Facility: CLINIC | Age: 46
End: 2021-09-16

## 2021-09-16 NOTE — TELEPHONE ENCOUNTER
Reason for call:  Medication   If this is a refill request, has the caller requested the refill from the pharmacy already? No  Will the patient be using a [facility]? No  Name of the pharmacy and phone number for the current request: [facility]     Name of the medication requested ASPIRIN (ASA) 81 MG CHEWABLE TABLET    Simvastatin (ZOCOR) 20 MG tablet     Other request: Patient is scheduled on 10/27/2021      Patient is needing medication to fulfill patient until physical appointment.     Phone number to reach patient:  Cell number on file:    Telephone Information:   Mobile [phone]       Best Time:  Any time     Can we leave a detailed message on this number?  YES

## 2021-09-17 RX ORDER — SIMVASTATIN 20 MG
TABLET ORAL
Qty: 90 TABLET | Refills: 0 | Status: SHIPPED | OUTPATIENT
Start: 2021-09-17 | End: 2021-10-27

## 2021-09-20 ENCOUNTER — OFFICE VISIT (OUTPATIENT)
Dept: FAMILY MEDICINE | Facility: CLINIC | Age: 46
End: 2021-09-20
Payer: MEDICAID

## 2021-09-20 VITALS
DIASTOLIC BLOOD PRESSURE: 84 MMHG | TEMPERATURE: 98.3 F | RESPIRATION RATE: 14 BRPM | OXYGEN SATURATION: 98 % | WEIGHT: 156.4 LBS | BODY MASS INDEX: 26.85 KG/M2 | SYSTOLIC BLOOD PRESSURE: 124 MMHG | HEART RATE: 78 BPM

## 2021-09-20 DIAGNOSIS — J30.89 SEASONAL ALLERGIC RHINITIS DUE TO OTHER ALLERGIC TRIGGER: ICD-10-CM

## 2021-09-20 DIAGNOSIS — Z23 NEED FOR PROPHYLACTIC VACCINATION AND INOCULATION AGAINST INFLUENZA: Primary | ICD-10-CM

## 2021-09-20 DIAGNOSIS — J01.90 ACUTE SINUSITIS WITH SYMPTOMS > 10 DAYS: ICD-10-CM

## 2021-09-20 PROCEDURE — 99213 OFFICE O/P EST LOW 20 MIN: CPT | Mod: 25 | Performed by: FAMILY MEDICINE

## 2021-09-20 PROCEDURE — 90471 IMMUNIZATION ADMIN: CPT | Performed by: FAMILY MEDICINE

## 2021-09-20 PROCEDURE — 90686 IIV4 VACC NO PRSV 0.5 ML IM: CPT | Performed by: FAMILY MEDICINE

## 2021-09-20 RX ORDER — CEPHALEXIN 500 MG/1
500 CAPSULE ORAL 3 TIMES DAILY
Qty: 21 CAPSULE | Refills: 0 | Status: SHIPPED | OUTPATIENT
Start: 2021-09-20 | End: 2021-10-27

## 2021-09-20 ASSESSMENT — PAIN SCALES - GENERAL: PAINLEVEL: MILD PAIN (3)

## 2021-09-20 NOTE — PATIENT INSTRUCTIONS
Patient Education     Understanding Acute Rhinosinusitis  Acute rhinosinusitis is when the lining of the inside of the nose and the sinuses becomes irritated and swollen. It is also called sinusitis, or a sinus infection.  Sinuses are air-filled spaces in the skull behind the face. They are kept moist and clean by a lining of mucosa. Things such as pollen, smoke, and chemical fumes can irritate the mucosa. It can then swell up. As a response to irritation, the mucosa makes more mucus and other fluids. Tiny hairlike cilia cover the mucosa. Cilia help carry mucus toward the opening of the sinus. Too much mucus may cause the cilia to stop working. This blocks the sinus opening. A buildup of fluid in the sinuses then causes pain and pressure. It can also cause bacteria to grow in the sinuses.    What causes acute rhinosinusitis?  A sinus infection is most often caused by a virus. You are more likely to get one after having a cold or the flu. In some cases, a sinus infection can be caused by bacteria.  You are at higher risk for a sinus infection if you:    Are older in age    Have structural problems with your sinuses    Smoke or are exposed to secondhand smoke    Are exposed to changes in pressure, such as from flying a lot or deep sea diving    Have asthma or allergies    Have a weak immune system    Have dental disease     Symptoms of acute rhinosinusitis  Symptoms of acute rhinosinusitis often last around 7 to 10 days. If you have a bacterial infection, they may last longer. They may also get better but then worsen. You may have:    Face pain or pressure under the eyes and around the nose    Headache    Fluid draining in the back of the throat (postnasal drip)    Congestion    Drainage that is thick and colored (often green), instead of clear    Cough    Problems with your sense of smell    Ear pain or hearing problems    Fever    Tooth pain    Fatigue  Diagnosing acute rhinosinusitis  Your healthcare provider  will ask about your symptoms and past health. He or she will look at your ears, nose, throat, and sinuses. Imaging tests, such as X-rays, are often not needed.  It can be hard to figure out if a sinus infection is caused by a virus or bacterium. A bacterial infection tends to last longer. Symptoms may also get better but then worsen. Your healthcare provider may take a sample of mucus from your nose to check for bacteria.  Treating acute rhinosinusitis  Most sinus infections will go away within 10 days. Your body will fight off the virus. If your symptoms seem to get better but then worsen, you may have a bacterial infection instead. Your healthcare provider will then give you antibiotics. Take this medicine until it is gone, even if you feel better.  To help ease your symptoms, your healthcare provider may advise:    Over-the-counter pain relievers. Medicines such as acetaminophen or ibuprofen can ease sinus pain. They may also lower a fever.    Nasal washes. Washing your nasal passages with salt water may ease pain and pressure. It can rinse out mucous and other irritants from your sinuses. Your healthcare provider can show you how to do it.    Nasal steroid spray. This prescription medicine can reduce inflammation in your sinuses.    Other medicines. Decongestants, antihistamines, and other nasal sprays may give short-term relief. They may help with congestion. Talk with your healthcare provider before taking these medicines.     Preventing acute rhinosinusitis  You can help prevent a sinus infection with these steps:    Wash your hands well and often.    Stay away from people who have a cold or upper respiratory infection.    Don't smoke. And stay away from secondhand smoke.    Use a humidifier at home.    Make sure you are up-to-date on your vaccines, such as the flu shot.     When to call your healthcare provider  Call your healthcare provider right away if you have any of these:    Fever of 100.4 F (38 C) or  higher, or as directed by your healthcare provider    Pain that gets worse    Symptoms that don t get better, or get worse    New symptoms  [employer] last reviewed this educational content on 6/1/2019 2000-2021 [employer]. All rights reserved. This information is not intended as a substitute for professional medical care. Always follow your healthcare professional's instructions.

## 2021-09-20 NOTE — PROGRESS NOTES
Assessment & Plan     Acute sinusitis with symptoms > 10 days  Recommended to start taking over-the-counter ibuprofen 400 mg three times a day for the next 3 days followed by three times a day as needed for headache and sinus pain  Start on Keflex three times a day for the next 1 week  Continue with sinus nasal rinses and Flonase nasal sprays  Dosing and potential medication side effects discussed.  Follow-up if no improvement noted in 1 week or sooner if needed  Dosing and potential medication side effects discussed.  Patient verbalised understanding and is agreeable to the plan.      - cephALEXin (KEFLEX) 500 MG capsule; Take 1 capsule (500 mg) by mouth 3 times daily    Seasonal allergic rhinitis due to other allergic trigger  Recommended to start taking over-the-counter antihistamine-Claritin or Zyrtec 10 mg once a day along with the Flonase nasal sprays  Patient is currently taking Allegra-D as needed for nasal congestion               Chart documentation done in part with Dragon Voice recognition Software. Although reviewed after completion, some word and grammatical error may remain.    See Patient Instructions    Return in about 1 week (around 9/27/2021), or if symptoms worsen or fail to improve.    Lilly Cartagena MD  [facility]    Steve Manley is a 46 year old who presents for the following health issues   Patient is new to the provider, she is here today with concerns of having pain over the forehead, cheeks and along the upper jaw, associated with pressure over the sinus areas, severe nasal congestion but with no associated nasal discharge, postnasal drip, pain and pressure in the left ear, headache for the past 2+ weeks  Has mild cough from postnasal drainage but denies shortness of breath, wheezing.  Denies fever, chills, runny nose, sore throat, abdominal pain, abnormal skin rashes, diarrhea  Past medical history significant for seasonal allergies, does Netti  pot and Flonase nasal sprays daily  Takes Allegra-D as needed for nasal congestion  Denies history of asthma, smoking  Has pet dogs at home but is not allergic to dogs  Patient is up-to-date on Covid vaccination, denies recent sick contact exposure  Past medical history significant for hypertension, on metoprolol    History of Present Illness       She eats 0-1 servings of fruits and vegetables daily.She consumes 0 sweetened beverage(s) daily.She exercises with enough effort to increase her heart rate 10 to 19 minutes per day.  She exercises with enough effort to increase her heart rate 3 or less days per week.   She is taking medications regularly.       Sinus pain, congestion, and pressure in head, on and off all summer.   Had drainage in L on and off for about a week, was getting crusty before that.           Review of Systems   CONSTITUTIONAL: NEGATIVE for fever, chills, change in weight  INTEGUMENTARY/SKIN: NEGATIVE for worrisome rashes, moles or lesions  EYES: NEGATIVE for vision changes or irritation  ENT/MOUTH: as above  RESP: NEGATIVE for significant cough or SOB  CV: NEGATIVE for chest pain, palpitations or peripheral edema  CV: History of hypertension  GI: NEGATIVE for nausea, abdominal pain, heartburn, or change in bowel habits  MUSCULOSKELETAL: NEGATIVE for significant arthralgias or myalgia  NEURO: NEGATIVE for weakness, dizziness or paresthesias  Has sinus headaches  PSYCHIATRIC: NEGATIVE for changes in mood or affect      Objective    /84   Pulse 78   Temp 98.3  F (36.8  C) (Tympanic)   Resp 14   Wt 70.9 kg (156 lb 6.4 oz)   LMP 09/20/2021 (Exact Date)   SpO2 98%   BMI 26.85 kg/m    Body mass index is 26.85 kg/m .  Physical Exam   GENERAL: healthy, alert and no distress  EYES: Eyes grossly normal to inspection  HENT: normal cephalic/atraumatic, both ears: mucopurulent effusion, nose and mouth without ulcers or lesions, oropharynx clear, oral mucous membranes moist and sinuses: maxillary,  frontal, ethmoid tenderness on bilateral  RESP: lungs clear to auscultation - no rales, rhonchi or wheezes  CV: regular rate and rhythm, normal S1 S2, no S3 or S4, no murmur, click or rub, no peripheral edema and peripheral pulses strong  MS: no gross musculoskeletal defects noted, no edema  SKIN: no suspicious lesions or rashes  PSYCH: mentation appears normal, affect normal/bright    Chart forwarded to PCP for FYI

## 2021-10-03 ENCOUNTER — HEALTH MAINTENANCE LETTER (OUTPATIENT)
Age: 46
End: 2021-10-03

## 2021-10-24 ASSESSMENT — ENCOUNTER SYMPTOMS
NAUSEA: 0
HEMATOCHEZIA: 0
ARTHRALGIAS: 0
DIARRHEA: 0
JOINT SWELLING: 0
FREQUENCY: 0
FEVER: 0
NERVOUS/ANXIOUS: 0
HEMATURIA: 0
MYALGIAS: 0
DIZZINESS: 0
BREAST MASS: 0
CHILLS: 0
CONSTIPATION: 0
SORE THROAT: 0
PARESTHESIAS: 0
PALPITATIONS: 0
SHORTNESS OF BREATH: 0
WEAKNESS: 0
HEADACHES: 0
DYSURIA: 0
COUGH: 0
ABDOMINAL PAIN: 0
HEARTBURN: 0
EYE PAIN: 0

## 2021-10-24 NOTE — PROGRESS NOTES
SUBJECTIVE:   CC: Sindhu Bennett is an 46 year old woman who presents for preventive health visit.       Patient has been advised of split billing requirements and indicates understanding: Yes  Healthy Habits:     Getting at least 3 servings of Calcium per day:  Yes    Bi-annual eye exam:  Yes    Dental care twice a year:  Yes    Sleep apnea or symptoms of sleep apnea:  None    Diet:  Low salt    Frequency of exercise:  2-3 days/week    Duration of exercise:  15-30 minutes    Taking medications regularly:  Yes    PHQ-2 Total Score: 0    Additional concerns today:  No      Cold symptoms for over a week, lost voice and green mucus. Feeling better, on the mend.  Kids had URI. Pt is the one taking care of the kids. All family tested for covid. Negative. Kids tested negative for strep and RSV.     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     Today's PHQ-2 Score:   PHQ-2 ( 1999 Pfizer) 10/24/2021   Q1: Little interest or pleasure in doing things 0   Q2: Feeling down, depressed or hopeless 0   PHQ-2 Score 0   Q1: Little interest or pleasure in doing things Not at all   Q2: Feeling down, depressed or hopeless Not at all   PHQ-2 Score 0       Abuse: Current or Past (Physical, Sexual or Emotional) - No  Do you feel safe in your environment? Yes    Have you ever done Advance Care Planning? (For example, a Health Directive, POLST, or a discussion with a medical provider or your loved ones about your wishes): No, advance care planning information given to patient to review.  Patient declined advance care planning discussion at this time.    Social History     Tobacco Use     Smoking status: Never Smoker     Smokeless tobacco: Never Used   Substance Use Topics     Alcohol use: Yes     Comment: occ.     If you drink alcohol do you typically have >3 drinks per day or >7 drinks per week? No    No flowsheet data found.    Reviewed orders with patient.  Reviewed health maintenance and updated orders  accordingly - No  Labs reviewed in EPIC    Breast Cancer Screening:    FHS-7:   Breast CA Risk Assessment (FHS-7) 10/24/2021   Did any of your first-degree relatives have breast or ovarian cancer? No   Did any of your relatives have bilateral breast cancer? No   Did any man in your family have breast cancer? No   Did any woman in your family have breast and ovarian cancer? No   Did any woman in your family have breast cancer before age 50 y? No   Do you have 2 or more relatives with breast and/or ovarian cancer? No   Do you have 2 or more relatives with breast and/or bowel cancer? No       Mammogram Screening: Recommended annual mammography  Pertinent mammograms are reviewed under the imaging tab.    History of abnormal Pap smear: NO - age 30-65 PAP every 5 years with negative HPV co-testing recommended  PAP / HPV Latest Ref Rng & Units 2/8/2018 12/31/2014   PAP (Historical) - NIL NIL   HPV16 NEG:Negative Negative -   HPV18 NEG:Negative Negative -   HRHPV NEG:Negative Negative -     Reviewed and updated as needed this visit by clinical staff  Tobacco  Allergies  Meds              Reviewed and updated as needed this visit by Provider                    Review of Systems   Constitutional: Negative for chills and fever.   HENT: Negative for congestion, ear pain, hearing loss and sore throat.    Eyes: Negative for pain and visual disturbance.   Respiratory: Negative for cough and shortness of breath.    Cardiovascular: Negative for chest pain, palpitations and peripheral edema.   Gastrointestinal: Negative for abdominal pain, constipation, diarrhea, heartburn, hematochezia and nausea.   Breasts:  Negative for tenderness, breast mass and discharge.   Genitourinary: Negative for dysuria, frequency, genital sores, hematuria, pelvic pain, urgency, vaginal bleeding and vaginal discharge.   Musculoskeletal: Negative for arthralgias, joint swelling and myalgias.   Skin: Negative for rash.   Neurological: Negative for  "dizziness, weakness, headaches and paresthesias.   Psychiatric/Behavioral: Negative for mood changes. The patient is not nervous/anxious.           OBJECTIVE:   /82   Pulse 64   Temp 98.5  F (36.9  C) (Oral)   Resp 20   Ht 1.607 m (5' 3.25\")   Wt 69.9 kg (154 lb 2 oz)   SpO2 97%   Breastfeeding No   BMI 27.09 kg/m    Physical Exam  GENERAL: healthy, alert and no distress  NECK: no adenopathy, no asymmetry, masses, or scars and thyroid normal to palpation  RESP: lungs clear to auscultation - no rales, rhonchi or wheezes  CV: regular rate and rhythm, normal S1 S2, no S3 or S4, no murmur, click or rub, no peripheral edema and peripheral pulses strong  ABDOMEN: soft, nontender, no hepatosplenomegaly, no masses and bowel sounds normal  MS: no gross musculoskeletal defects noted, no edema  NEURO: Normal strength and tone, mentation intact and speech normal  PSYCH: mentation appears normal, affect normal/bright    Diagnostic Test Results:  No results found for this or any previous visit (from the past 24 hour(s)).    ASSESSMENT/PLAN:   Sindhu was seen today for physical.    Diagnoses and all orders for this visit:    Routine general medical examination at a health care facility    Hypertension, goal below 140/90  -     Albumin Random Urine Quantitative with Creat Ratio  -     Comprehensive metabolic panel  -     metoprolol succinate ER (TOPROL-XL) 50 MG 24 hr tablet; Take 1 tablet (50 mg) by mouth daily    Hyperlipidemia LDL goal <100  -     Lipid Profile  -     Comprehensive metabolic panel    S/P repair of coarctation of aorta  -     simvastatin (ZOCOR) 20 MG tablet; Take 1 tablet (20 mg) by mouth at bedtime    Coarctation of aorta  -     simvastatin (ZOCOR) 20 MG tablet; Take 1 tablet (20 mg) by mouth at bedtime    Perennial allergic rhinitis  -     fluticasone (FLONASE) 50 MCG/ACT nasal spray; Spray 2 sprays into both nostrils daily    Encounter for screening mammogram for breast cancer  -     MA " "Screening Digital Bilateral; Future    Irritable bowel syndrome, unspecified type  -     Nutrition Referral; Future    Endometriosis  -     Nutrition Referral; Future    Other orders  -     REVIEW OF HEALTH MAINTENANCE PROTOCOL ORDERS      -- stable chronic health issues. Medications refilled.    -- return in 1 year if labs today acceptable.     Patient has been advised of split billing requirements and indicates understanding: No  COUNSELING:  Reviewed preventive health counseling, as reflected in patient instructions    Estimated body mass index is 27.09 kg/m  as calculated from the following:    Height as of this encounter: 1.607 m (5' 3.25\").    Weight as of this encounter: 69.9 kg (154 lb 2 oz).    Weight management plan: Discussed healthy diet and exercise guidelines    She reports that she has never smoked. She has never used smokeless tobacco.      Counseling Resources:  ATP IV Guidelines  Pooled Cohorts Equation Calculator  Breast Cancer Risk Calculator  BRCA-Related Cancer Risk Assessment: FHS-7 Tool  FRAX Risk Assessment  ICSI Preventive Guidelines  Dietary Guidelines for Americans, 2010  USDA's MyPlate  ASA Prophylaxis  Lung CA Screening    Sonia Rodriguez MD PhD  [facility]  "

## 2021-10-24 NOTE — PATIENT INSTRUCTIONS
Preventive Health Recommendations  Female Ages 40 to 49    Yearly exam:     See your health care provider every year in order to  1. Review health changes.   2. Discuss preventive care.    3. Review your medicines if your doctor prescribed any.      Get a Pap test every three years (unless you have an abnormal result and your provider advises testing more often).      If you get Pap tests with HPV test, you only need to test every 5 years, unless you have an abnormal result. You do not need a Pap test if your uterus was removed (hysterectomy) and you have not had cancer.      You should be tested each year for STDs (sexually transmitted diseases), if you're at risk.     Ask your doctor if you should have a mammogram.      Have a colonoscopy (test for colon cancer) if someone in your family has had colon cancer or polyps before age 50.       Have a cholesterol test every 5 years.       Have a diabetes test (fasting glucose) after age 45. If you are at risk for diabetes, you should have this test every 3 years.    Shots: Get a flu shot each year. Get a tetanus shot every 10 years.     Nutrition:     Eat at least 5 servings of fruits and vegetables each day.    Eat whole-grain bread, whole-wheat pasta and brown rice instead of white grains and rice.    Get adequate Calcium and Vitamin D.      Lifestyle    Exercise at least 150 minutes a week (an average of 30 minutes a day, 5 days a week). This will help you control your weight and prevent disease.    Limit alcohol to one drink per day.    No smoking.     Wear sunscreen to prevent skin cancer.    See your dentist every six months for an exam and cleaning.    10 Healthy Habits  Eat Better ? Move More ? Live Well  1.  Eat breakfast daily.      Try to eat within the first hour after you wake up. This helps you control your appetite during the day, and you are less likely to snack in the evening.     80% of people who skip meals are overweight or obese.     2.   Include  protein with every meal, even breakfast.     Protein will suppress your appetite longer than other types of food. This helps you  feel more satisfied with the amount of food you have eaten.     3.  Fill up on Fiber    Fiber comes from plants--fruits, veggies, whole grains, nuts/seeds and beans    Fiber is low in calories, high in phytonutrients and helps you stay full longer     4.  Eat S-L-O-W-L-Y    Take 20-30 minutes to eat each meal by taking small bites, chewing foods to applesauce consistency or 20-30 times before you swallow    Eating foods too fast can delay satiety/fullness signals and increase overeating      5.  Avoid Mindless Eating    Be present when you eat--take note of the smell, taste and quality of your food    Make a list of alternative activities you could do to prevent boredom/stress eating  Go for a walk, call a friend, chew gum, paint your nails     6.  Keep a Journal    Writing down what you eat, how you feel and when you are active helps you identify new changes to work on from week to week          Look for ways to cut 100 calories from your current diet 2-3 times per day     7.  Drink 64 ounces of Non Calorie drinks between meals    Water    Zero calorie Propel , Vitamin Water , Crystal Light     Avoid regular soda pop     8.  Stop thinking about food choices as a  diet.     Instead, think of them as healthy, lifelong habits--an effort toward a new way of eating.    Weigh yourself once a week instead of everyday.      9.  Get enough sleep--at least 7 to 8 hours each night.     Lack of sleep is one reason that fat builds up around the waist.     10.  Exercise five to six times per week     Do a combination aerobic exercise (fast walking, biking, swimming) and strength training (Dumbbells, pushups, weight machines, resistance bands).    Start at 10 minutes per day and slowly work your way up to 30 minutes or more.   Taking the stairs instead of the elevator, park at the far end of the  parking lot, pace while on the phone, take the dog for a walk.      [email or web address]

## 2021-10-27 ENCOUNTER — OFFICE VISIT (OUTPATIENT)
Dept: FAMILY MEDICINE | Facility: CLINIC | Age: 46
End: 2021-10-27
Payer: MEDICAID

## 2021-10-27 VITALS
TEMPERATURE: 98.5 F | HEART RATE: 64 BPM | RESPIRATION RATE: 20 BRPM | BODY MASS INDEX: 27.31 KG/M2 | WEIGHT: 154.13 LBS | HEIGHT: 63 IN | SYSTOLIC BLOOD PRESSURE: 128 MMHG | OXYGEN SATURATION: 97 % | DIASTOLIC BLOOD PRESSURE: 82 MMHG

## 2021-10-27 DIAGNOSIS — J30.89 PERENNIAL ALLERGIC RHINITIS: ICD-10-CM

## 2021-10-27 DIAGNOSIS — K58.9 IRRITABLE BOWEL SYNDROME, UNSPECIFIED TYPE: ICD-10-CM

## 2021-10-27 DIAGNOSIS — E78.5 HYPERLIPIDEMIA LDL GOAL <100: ICD-10-CM

## 2021-10-27 DIAGNOSIS — I10 HYPERTENSION, GOAL BELOW 140/90: ICD-10-CM

## 2021-10-27 DIAGNOSIS — N80.9 ENDOMETRIOSIS: ICD-10-CM

## 2021-10-27 DIAGNOSIS — Z00.00 ROUTINE GENERAL MEDICAL EXAMINATION AT A HEALTH CARE FACILITY: Primary | ICD-10-CM

## 2021-10-27 DIAGNOSIS — Q25.1 COARCTATION OF AORTA: ICD-10-CM

## 2021-10-27 DIAGNOSIS — Z12.31 ENCOUNTER FOR SCREENING MAMMOGRAM FOR BREAST CANCER: ICD-10-CM

## 2021-10-27 DIAGNOSIS — Z87.74 S/P REPAIR OF COARCTATION OF AORTA: ICD-10-CM

## 2021-10-27 LAB
ALBUMIN SERPL-MCNC: 4.1 G/DL (ref 3.4–5)
ALP SERPL-CCNC: 83 U/L (ref 40–150)
ALT SERPL W P-5'-P-CCNC: 17 U/L (ref 0–50)
ANION GAP SERPL CALCULATED.3IONS-SCNC: 3 MMOL/L (ref 3–14)
AST SERPL W P-5'-P-CCNC: 11 U/L (ref 0–45)
BILIRUB SERPL-MCNC: 0.7 MG/DL (ref 0.2–1.3)
BUN SERPL-MCNC: 10 MG/DL (ref 7–30)
CALCIUM SERPL-MCNC: 9.4 MG/DL (ref 8.5–10.1)
CHLORIDE BLD-SCNC: 108 MMOL/L (ref 94–109)
CHOLEST SERPL-MCNC: 144 MG/DL
CO2 SERPL-SCNC: 28 MMOL/L (ref 20–32)
CREAT SERPL-MCNC: 0.74 MG/DL (ref 0.52–1.04)
FASTING STATUS PATIENT QL REPORTED: YES
GFR SERPL CREATININE-BSD FRML MDRD: >90 ML/MIN/1.73M2
GLUCOSE BLD-MCNC: 91 MG/DL (ref 70–99)
HDLC SERPL-MCNC: 46 MG/DL
LDLC SERPL CALC-MCNC: 82 MG/DL
NONHDLC SERPL-MCNC: 98 MG/DL
POTASSIUM BLD-SCNC: 4.6 MMOL/L (ref 3.4–5.3)
PROT SERPL-MCNC: 7.8 G/DL (ref 6.8–8.8)
SODIUM SERPL-SCNC: 139 MMOL/L (ref 133–144)
TRIGL SERPL-MCNC: 81 MG/DL

## 2021-10-27 PROCEDURE — 80061 LIPID PANEL: CPT | Performed by: INTERNAL MEDICINE

## 2021-10-27 PROCEDURE — 36415 COLL VENOUS BLD VENIPUNCTURE: CPT | Performed by: INTERNAL MEDICINE

## 2021-10-27 PROCEDURE — 99396 PREV VISIT EST AGE 40-64: CPT | Performed by: INTERNAL MEDICINE

## 2021-10-27 PROCEDURE — 82043 UR ALBUMIN QUANTITATIVE: CPT | Performed by: INTERNAL MEDICINE

## 2021-10-27 PROCEDURE — 80053 COMPREHEN METABOLIC PANEL: CPT | Performed by: INTERNAL MEDICINE

## 2021-10-27 RX ORDER — SIMVASTATIN 20 MG
TABLET ORAL
Qty: 90 TABLET | Refills: 3 | Status: SHIPPED | OUTPATIENT
Start: 2021-10-27 | End: 2023-01-11

## 2021-10-27 RX ORDER — METOPROLOL SUCCINATE 50 MG/1
50 TABLET, EXTENDED RELEASE ORAL DAILY
Qty: 90 TABLET | Refills: 3 | Status: SHIPPED | OUTPATIENT
Start: 2021-10-27 | End: 2023-01-11

## 2021-10-27 RX ORDER — FLUTICASONE PROPIONATE 50 MCG
2 SPRAY, SUSPENSION (ML) NASAL DAILY
Qty: 48 G | Refills: 3 | Status: SHIPPED | OUTPATIENT
Start: 2021-10-27 | End: 2023-04-05

## 2021-10-27 ASSESSMENT — MIFFLIN-ST. JEOR: SCORE: 1317.2

## 2021-10-28 LAB
CREAT UR-MCNC: 28 MG/DL
MICROALBUMIN UR-MCNC: <5 MG/L
MICROALBUMIN/CREAT UR: NORMAL MG/G{CREAT}

## 2021-10-28 NOTE — RESULT ENCOUNTER NOTE
Dear Sindhu,   Your recent test result are within acceptable range or at baseline.   Your HDL is a little low. HDL is the good cholesterol. Medication is not needed at this time. [facility] has a website on how to boost HDL through diet and exercise.  Here is the link. I hope this is helpful.     [email or web address]      Please call or Mychart to our office if you have further questions.     Sonia Rodriguez MD-PhD

## 2021-12-17 DIAGNOSIS — Q25.1 COARCTATION OF AORTA: ICD-10-CM

## 2021-12-17 DIAGNOSIS — Z87.74 S/P REPAIR OF COARCTATION OF AORTA: ICD-10-CM

## 2021-12-17 RX ORDER — ASPIRIN 81 MG/1
TABLET, CHEWABLE ORAL
Qty: 90 TABLET | Refills: 0 | Status: SHIPPED | OUTPATIENT
Start: 2021-12-17 | End: 2022-03-11

## 2021-12-21 ENCOUNTER — E-VISIT (OUTPATIENT)
Dept: FAMILY MEDICINE | Facility: CLINIC | Age: 46
End: 2021-12-21
Payer: MEDICAID

## 2021-12-21 DIAGNOSIS — Z20.822 ENCOUNTER FOR LABORATORY TESTING FOR COVID-19 VIRUS: Primary | ICD-10-CM

## 2021-12-21 PROCEDURE — 99207 PR NO CHARGE LOS: CPT | Performed by: INTERNAL MEDICINE

## 2021-12-22 NOTE — PATIENT INSTRUCTIONS
Dear Sindhu Bennett,    Based on your responses, we have ordered COVID-19 (coronavirus) testing for you. Testing is recommended for people without symptoms in a number of different situations. These reasons include testing prior to air travel, testing after international travel, periodic testing for people who are attending in-person school, and testing related to participation in activities such as sports.     How to schedule:  Go to your [employer] home page and scroll down to the section that says  You have an appointment that needs to be scheduled  and click the large green button that says  Schedule Now  and follow the steps to find the next available opening.     If you are unable to complete these [employer] scheduling steps, please call [phone] to schedule your testing.      Know the test result takes usually 1-2 days to return but occasionally takes longer (over 95% are done within 72 hours).The results are available on [employer] right when the lab is completed. We will also call all people who have positive results.    What are the symptoms of COVID-19?  The most common symptoms are cough, fever and trouble breathing. Less common symptoms include headache, body aches, fatigue (feeling very tired), chills, sore throat, stuffy or runny nose, diarrhea (loose poop), loss of taste or smell, belly pain, and nausea or vomiting (feeling sick to your stomach or throwing up).    If you develop symptoms of COVID-19, you should be re-evaluated to see if retesting would be recommended.     Where can I get more information?  [facility] - About COVID-19: [email or web address]  CDC - What to Do If You're Sick: www.cdc.gov/coronavirus/2019-ncov/about/steps-when-sick.html  CDC - Ending Home Isolation: www.cdc.gov/coronavirus/2019-ncov/hcp/disposition-in-home-patients.html  CDC - Caring for Someone: www.cdc.gov/coronavirus/2019-ncov/if-you-are-sick/care-for-someone.html  [facility]  clinical trials (COVID-19 research studies): [email or web address]  Below are the COVID-19 hotlines at the [place] [facility] ([facility]). Interpreters are available.  For health questions: Call 554-331-6556 or 1-735.128.1097 (7 a.m. to 7 p.m.)  For questions about schools and childcare: Call 782-723-3176 or 1-684.158.5509 (7 a.m. to 7 p.m.)

## 2022-01-09 ENCOUNTER — LAB (OUTPATIENT)
Dept: URGENT CARE | Facility: URGENT CARE | Age: 47
End: 2022-01-09
Attending: INTERNAL MEDICINE
Payer: MEDICAID

## 2022-01-09 DIAGNOSIS — Z20.822 ENCOUNTER FOR LABORATORY TESTING FOR COVID-19 VIRUS: ICD-10-CM

## 2022-01-09 LAB — SARS-COV-2 RNA RESP QL NAA+PROBE: POSITIVE

## 2022-01-09 PROCEDURE — U0005 INFEC AGEN DETEC AMPLI PROBE: HCPCS

## 2022-01-09 PROCEDURE — U0003 INFECTIOUS AGENT DETECTION BY NUCLEIC ACID (DNA OR RNA); SEVERE ACUTE RESPIRATORY SYNDROME CORONAVIRUS 2 (SARS-COV-2) (CORONAVIRUS DISEASE [COVID-19]), AMPLIFIED PROBE TECHNIQUE, MAKING USE OF HIGH THROUGHPUT TECHNOLOGIES AS DESCRIBED BY CMS-2020-01-R: HCPCS

## 2022-01-10 ENCOUNTER — MYC MEDICAL ADVICE (OUTPATIENT)
Dept: FAMILY MEDICINE | Facility: CLINIC | Age: 47
End: 2022-01-10

## 2022-03-20 ENCOUNTER — HEALTH MAINTENANCE LETTER (OUTPATIENT)
Age: 47
End: 2022-03-20

## 2022-08-26 ENCOUNTER — MYC MEDICAL ADVICE (OUTPATIENT)
Dept: FAMILY MEDICINE | Facility: CLINIC | Age: 47
End: 2022-08-26

## 2022-08-26 DIAGNOSIS — Q25.1 COARCTATION OF AORTA: ICD-10-CM

## 2022-08-26 DIAGNOSIS — Z87.74 S/P REPAIR OF COARCTATION OF AORTA: ICD-10-CM

## 2022-08-29 RX ORDER — ASPIRIN 81 MG/1
TABLET, CHEWABLE ORAL
Qty: 90 TABLET | Refills: 0 | Status: SHIPPED | OUTPATIENT
Start: 2022-08-29 | End: 2022-11-21

## 2022-08-29 NOTE — TELEPHONE ENCOUNTER
Prescription approved per [facility] [place] [facility].    Karin Moore, RN, BSN

## 2022-09-10 ENCOUNTER — HEALTH MAINTENANCE LETTER (OUTPATIENT)
Age: 47
End: 2022-09-10

## 2022-11-30 ENCOUNTER — TRANSFERRED RECORDS (OUTPATIENT)
Dept: FAMILY MEDICINE | Facility: CLINIC | Age: 47
End: 2022-11-30

## 2022-12-09 ENCOUNTER — TRANSFERRED RECORDS (OUTPATIENT)
Dept: HEALTH INFORMATION MANAGEMENT | Facility: CLINIC | Age: 47
End: 2022-12-09

## 2023-01-06 ENCOUNTER — TRANSFERRED RECORDS (OUTPATIENT)
Dept: HEALTH INFORMATION MANAGEMENT | Facility: CLINIC | Age: 48
End: 2023-01-06

## 2023-01-12 ENCOUNTER — MYC REFILL (OUTPATIENT)
Dept: FAMILY MEDICINE | Facility: CLINIC | Age: 48
End: 2023-01-12

## 2023-01-12 DIAGNOSIS — Z87.74 S/P REPAIR OF COARCTATION OF AORTA: ICD-10-CM

## 2023-01-12 DIAGNOSIS — I10 HYPERTENSION, GOAL BELOW 140/90: ICD-10-CM

## 2023-01-12 DIAGNOSIS — Q25.1 COARCTATION OF AORTA: ICD-10-CM

## 2023-01-12 RX ORDER — METOPROLOL SUCCINATE 50 MG/1
50 TABLET, EXTENDED RELEASE ORAL DAILY
Qty: 90 TABLET | Refills: 0 | Status: CANCELLED | OUTPATIENT
Start: 2023-01-12

## 2023-01-12 RX ORDER — ASPIRIN 81 MG/1
TABLET, CHEWABLE ORAL
Qty: 90 TABLET | Refills: 0 | Status: CANCELLED | OUTPATIENT
Start: 2023-01-12

## 2023-01-12 RX ORDER — SIMVASTATIN 20 MG
TABLET ORAL
Qty: 90 TABLET | Refills: 0 | Status: CANCELLED | OUTPATIENT
Start: 2023-01-12

## 2023-01-13 ENCOUNTER — TELEPHONE (OUTPATIENT)
Dept: FAMILY MEDICINE | Facility: CLINIC | Age: 48
End: 2023-01-13

## 2023-01-13 NOTE — TELEPHONE ENCOUNTER
Called Pt to schedule Apt as requested from provider Pt is scheduled on 4/05/23 for physical I scheduled labs but it was way ahead of Apt so called Pt back to get that canceled and get her scheduled sooner to Apt day.      Saranya GOMES Visit Facilitator

## 2023-01-22 ENCOUNTER — HEALTH MAINTENANCE LETTER (OUTPATIENT)
Age: 48
End: 2023-01-22

## 2023-02-10 ENCOUNTER — TRANSFERRED RECORDS (OUTPATIENT)
Dept: HEALTH INFORMATION MANAGEMENT | Facility: CLINIC | Age: 48
End: 2023-02-10

## 2023-03-08 ENCOUNTER — DOCUMENTATION ONLY (OUTPATIENT)
Dept: LAB | Facility: CLINIC | Age: 48
End: 2023-03-08
Payer: COMMERCIAL

## 2023-03-08 DIAGNOSIS — E78.5 HYPERLIPIDEMIA LDL GOAL <100: ICD-10-CM

## 2023-03-08 DIAGNOSIS — I10 HYPERTENSION, GOAL BELOW 140/90: Primary | ICD-10-CM

## 2023-03-08 NOTE — PROGRESS NOTES
Patient have Lab appointment on 3/31/23 and there is no orders. Please order test if needed.  Thanks  Monalisa Brown MLT([facility])

## 2023-03-13 NOTE — PROGRESS NOTES
Esau Manley  Your abdominal ultrasound did not show anything to explain your symptoms.   Please call or MyChart my office with any questions or concerns.    Sahara Howard, PAC      
regular/solids with thin liquids, per pt report

## 2023-03-31 ENCOUNTER — LAB (OUTPATIENT)
Dept: LAB | Facility: CLINIC | Age: 48
End: 2023-03-31
Payer: COMMERCIAL

## 2023-03-31 DIAGNOSIS — E78.5 HYPERLIPIDEMIA LDL GOAL <100: ICD-10-CM

## 2023-03-31 DIAGNOSIS — I10 HYPERTENSION, GOAL BELOW 140/90: ICD-10-CM

## 2023-03-31 LAB
ALBUMIN SERPL-MCNC: 3.7 G/DL (ref 3.4–5)
ALP SERPL-CCNC: 65 U/L (ref 40–150)
ALT SERPL W P-5'-P-CCNC: 16 U/L (ref 0–50)
ANION GAP SERPL CALCULATED.3IONS-SCNC: 3 MMOL/L (ref 3–14)
AST SERPL W P-5'-P-CCNC: 19 U/L (ref 0–45)
BILIRUB SERPL-MCNC: 1 MG/DL (ref 0.2–1.3)
BUN SERPL-MCNC: 17 MG/DL (ref 7–30)
CALCIUM SERPL-MCNC: 8.9 MG/DL (ref 8.5–10.1)
CHLORIDE BLD-SCNC: 111 MMOL/L (ref 94–109)
CHOLEST SERPL-MCNC: 147 MG/DL
CO2 SERPL-SCNC: 26 MMOL/L (ref 20–32)
CREAT SERPL-MCNC: 0.75 MG/DL (ref 0.52–1.04)
CREAT UR-MCNC: 111 MG/DL
ERYTHROCYTE [DISTWIDTH] IN BLOOD BY AUTOMATED COUNT: 13 % (ref 10–15)
FASTING STATUS PATIENT QL REPORTED: YES
GFR SERPL CREATININE-BSD FRML MDRD: >90 ML/MIN/1.73M2
GLUCOSE BLD-MCNC: 99 MG/DL (ref 70–99)
HCT VFR BLD AUTO: 38.4 % (ref 35–47)
HDLC SERPL-MCNC: 53 MG/DL
HGB BLD-MCNC: 12.7 G/DL (ref 11.7–15.7)
LDLC SERPL CALC-MCNC: 71 MG/DL
MCH RBC QN AUTO: 28.5 PG (ref 26.5–33)
MCHC RBC AUTO-ENTMCNC: 33.1 G/DL (ref 31.5–36.5)
MCV RBC AUTO: 86 FL (ref 78–100)
MICROALBUMIN UR-MCNC: 10 MG/L
MICROALBUMIN/CREAT UR: 9.01 MG/G CR (ref 0–25)
NONHDLC SERPL-MCNC: 94 MG/DL
PLATELET # BLD AUTO: 203 10E3/UL (ref 150–450)
POTASSIUM BLD-SCNC: 4.3 MMOL/L (ref 3.4–5.3)
PROT SERPL-MCNC: 7 G/DL (ref 6.8–8.8)
RBC # BLD AUTO: 4.46 10E6/UL (ref 3.8–5.2)
SODIUM SERPL-SCNC: 140 MMOL/L (ref 133–144)
TRIGL SERPL-MCNC: 116 MG/DL
WBC # BLD AUTO: 7.6 10E3/UL (ref 4–11)

## 2023-03-31 PROCEDURE — 36415 COLL VENOUS BLD VENIPUNCTURE: CPT

## 2023-03-31 PROCEDURE — 82570 ASSAY OF URINE CREATININE: CPT

## 2023-03-31 PROCEDURE — 80053 COMPREHEN METABOLIC PANEL: CPT

## 2023-03-31 PROCEDURE — 80061 LIPID PANEL: CPT

## 2023-03-31 PROCEDURE — 82043 UR ALBUMIN QUANTITATIVE: CPT

## 2023-03-31 PROCEDURE — 85027 COMPLETE CBC AUTOMATED: CPT

## 2023-04-03 NOTE — RESULT ENCOUNTER NOTE
Dear Sindhu,   Your recent test results showed the following:  -- comprehensive metabolic panel is normal. Borderline elevated chloride is not clinically significant.    Please call or Mychart to our office if you have further questions.     Sonia Rodriguez MD-PhD

## 2023-04-05 ENCOUNTER — OFFICE VISIT (OUTPATIENT)
Dept: FAMILY MEDICINE | Facility: CLINIC | Age: 48
End: 2023-04-05
Payer: COMMERCIAL

## 2023-04-05 VITALS
HEART RATE: 64 BPM | HEIGHT: 64 IN | TEMPERATURE: 98.3 F | SYSTOLIC BLOOD PRESSURE: 130 MMHG | OXYGEN SATURATION: 100 % | RESPIRATION RATE: 16 BRPM | BODY MASS INDEX: 26.29 KG/M2 | DIASTOLIC BLOOD PRESSURE: 70 MMHG | WEIGHT: 154 LBS

## 2023-04-05 DIAGNOSIS — J30.89 PERENNIAL ALLERGIC RHINITIS: ICD-10-CM

## 2023-04-05 DIAGNOSIS — Z87.74 S/P REPAIR OF COARCTATION OF AORTA: ICD-10-CM

## 2023-04-05 DIAGNOSIS — Z12.11 SCREEN FOR COLON CANCER: ICD-10-CM

## 2023-04-05 DIAGNOSIS — Z00.00 ROUTINE GENERAL MEDICAL EXAMINATION AT A HEALTH CARE FACILITY: Primary | ICD-10-CM

## 2023-04-05 DIAGNOSIS — Z12.31 VISIT FOR SCREENING MAMMOGRAM: ICD-10-CM

## 2023-04-05 DIAGNOSIS — E78.5 HYPERLIPIDEMIA LDL GOAL <100: ICD-10-CM

## 2023-04-05 DIAGNOSIS — Z12.4 CERVICAL CANCER SCREENING: ICD-10-CM

## 2023-04-05 DIAGNOSIS — I10 HYPERTENSION, GOAL BELOW 140/90: ICD-10-CM

## 2023-04-05 PROCEDURE — 99396 PREV VISIT EST AGE 40-64: CPT | Performed by: INTERNAL MEDICINE

## 2023-04-05 PROCEDURE — 87624 HPV HI-RISK TYP POOLED RSLT: CPT | Performed by: INTERNAL MEDICINE

## 2023-04-05 PROCEDURE — G0145 SCR C/V CYTO,THINLAYER,RESCR: HCPCS | Performed by: INTERNAL MEDICINE

## 2023-04-05 PROCEDURE — 99213 OFFICE O/P EST LOW 20 MIN: CPT | Mod: 25 | Performed by: INTERNAL MEDICINE

## 2023-04-05 RX ORDER — ASPIRIN 81 MG/1
81 TABLET, CHEWABLE ORAL DAILY
Qty: 90 TABLET | Refills: 3 | Status: SHIPPED | OUTPATIENT
Start: 2023-04-05 | End: 2024-05-04

## 2023-04-05 RX ORDER — FLUTICASONE PROPIONATE 50 MCG
2 SPRAY, SUSPENSION (ML) NASAL DAILY
Qty: 48 G | Refills: 3 | Status: SHIPPED | OUTPATIENT
Start: 2023-04-05 | End: 2024-05-04

## 2023-04-05 RX ORDER — METOPROLOL SUCCINATE 50 MG/1
50 TABLET, EXTENDED RELEASE ORAL DAILY
Qty: 90 TABLET | Refills: 3 | Status: SHIPPED | OUTPATIENT
Start: 2023-04-05 | End: 2024-05-10

## 2023-04-05 RX ORDER — SIMVASTATIN 20 MG
20 TABLET ORAL AT BEDTIME
Qty: 90 TABLET | Refills: 3 | Status: SHIPPED | OUTPATIENT
Start: 2023-04-05 | End: 2024-05-10

## 2023-04-05 ASSESSMENT — ENCOUNTER SYMPTOMS
HEARTBURN: 0
CONSTIPATION: 1
NERVOUS/ANXIOUS: 0
COUGH: 0
JOINT SWELLING: 0
SORE THROAT: 0
ARTHRALGIAS: 0
ABDOMINAL PAIN: 0
DIARRHEA: 0
FREQUENCY: 0
EYE PAIN: 0
NAUSEA: 0
MYALGIAS: 0
CHILLS: 0
BREAST MASS: 0
HEADACHES: 0
HEMATURIA: 0
DIZZINESS: 0
DYSURIA: 0
PARESTHESIAS: 0
WEAKNESS: 0
PALPITATIONS: 0
SHORTNESS OF BREATH: 0
HEMATOCHEZIA: 0
FEVER: 0

## 2023-04-05 ASSESSMENT — PAIN SCALES - GENERAL: PAINLEVEL: NO PAIN (0)

## 2023-04-05 NOTE — PROGRESS NOTES
SUBJECTIVE:   CC: Sindhu is an 47 year old who presents for preventive health visit.       4/5/2023     9:52 AM   Additional Questions   Roomed by jose   Accompanied by self   Patient has been advised of split billing requirements and indicates understanding: Yes  Sindhu has S/P repair of coarctation of aorta and Hypertension, goal below 140/90 on their pertinent problem list.  Patient reports doing well.  No specific concerns.  Home blood pressure mostly in the 130s over 70 range.  Patient has her own business and interior design.  Has been busy.      Healthy Habits:     Getting at least 3 servings of Calcium per day:  Yes    Bi-annual eye exam:  Yes    Dental care twice a year:  Yes    Sleep apnea or symptoms of sleep apnea:  None    Diet:  Low salt    Frequency of exercise:  2-3 days/week    Duration of exercise:  15-30 minutes    Taking medications regularly:  Yes    Medication side effects:  None    PHQ-2 Total Score: 0    Additional concerns today:  Yes          Today's PHQ-2 Score:       4/5/2023     9:48 AM   PHQ-2 ( 1999 [employer])   Q1: Little interest or pleasure in doing things 0   Q2: Feeling down, depressed or hopeless 0   PHQ-2 Score 0   Q1: Little interest or pleasure in doing things Not at all    Not at all   Q2: Feeling down, depressed or hopeless Not at all    Not at all   PHQ-2 Score 0    0       Social History     Tobacco Use     Smoking status: Never     Passive exposure: Never     Smokeless tobacco: Never   Vaping Use     Vaping status: Never Used   Substance Use Topics     Alcohol use: Yes     Comment: occ.             4/5/2023     9:48 AM   Alcohol Use   Prescreen: >3 drinks/day or >7 drinks/week? No     Reviewed orders with patient.  Reviewed health maintenance and updated orders accordingly - Yes  Labs reviewed in EPIC    Breast Cancer Screening:    FHS-7:       10/24/2021     8:09 AM 4/5/2023     9:51 AM   Breast CA Risk Assessment (FHS-7)   Did any of your first-degree relatives  have breast or ovarian cancer? No Yes   Did any of your relatives have bilateral breast cancer? No No   Did any man in your family have breast cancer? No    Did any woman in your family have breast and ovarian cancer? No    Did any woman in your family have breast cancer before age 50 y? No    Do you have 2 or more relatives with breast and/or ovarian cancer? No    Do you have 2 or more relatives with breast and/or bowel cancer? No        Mammogram Screening: Recommended annual mammography  Pertinent mammograms are reviewed under the imaging tab.    History of abnormal Pap smear: NO - age 30-65 PAP every 5 years with negative HPV co-testing recommended      Latest Ref Rng & Units 2/8/2018     8:26 AM 2/8/2018     8:21 AM 12/31/2014    12:00 AM   PAP / HPV   PAP (Historical)   NIL   NIL     HPV 16 DNA NEG^Negative Negative       HPV 18 DNA NEG^Negative Negative       Other HR HPV NEG^Negative Negative         Reviewed and updated as needed this visit by clinical staff   Tobacco  Allergies  Meds              Reviewed and updated as needed this visit by Provider                     Review of Systems   Constitutional: Negative for chills and fever.   HENT: Negative for congestion, ear pain, hearing loss and sore throat.    Eyes: Negative for pain and visual disturbance.   Respiratory: Negative for cough and shortness of breath.    Cardiovascular: Negative for chest pain, palpitations and peripheral edema.   Gastrointestinal: Positive for constipation (chronic issue). Negative for abdominal pain, diarrhea, heartburn, hematochezia and nausea.   Breasts:  Positive for tenderness (with menses only.). Negative for breast mass and discharge.   Genitourinary: Positive for vaginal bleeding (end of her menses). Negative for dysuria, frequency, genital sores, hematuria, pelvic pain, urgency and vaginal discharge.   Musculoskeletal: Negative for arthralgias, joint swelling and myalgias.   Skin: Negative for rash.  "  Neurological: Negative for dizziness, weakness, headaches and paresthesias.   Psychiatric/Behavioral: Negative for mood changes. The patient is not nervous/anxious.         OBJECTIVE:   /70   Pulse 64   Temp 98.3  F (36.8  C) (Tympanic)   Resp 16   Ht 1.619 m (5' 3.75\")   Wt 69.9 kg (154 lb)   LMP 04/02/2023   SpO2 100%   BMI 26.64 kg/m    Physical Exam  GENERAL APPEARANCE: healthy, alert and no distress  EYES: Eyes grossly normal to inspection, PERRL and conjunctivae and sclerae normal  HENT: ear canals and TM's normal, nose and mouth without ulcers or lesions, oropharynx clear and oral mucous membranes moist  NECK: no adenopathy, no asymmetry, masses, or scars and thyroid normal to palpation  RESP: lungs clear to auscultation - no rales, rhonchi or wheezes  BREAST: normal without masses, tenderness or nipple discharge and no palpable axillary masses or adenopathy  CV: regular rate and rhythm, normal S1 S2, no S3 or S4, no murmur, click or rub, no peripheral edema and peripheral pulses strong  ABDOMEN: soft, nontender, no hepatosplenomegaly, no masses and bowel sounds normal   (female): normal female external genitalia, normal urethral meatus, vaginal mucosal atrophy noted, normal cervix, adnexae, and uterus without masses or abnormal discharge  MS: no musculoskeletal defects are noted and gait is age appropriate without ataxia  SKIN: no suspicious lesions or rashes  NEURO: Normal strength and tone, sensory exam grossly normal, mentation intact and speech normal  PSYCH: mentation appears normal and affect normal/bright    Diagnostic Test Results:  Lab on 03/31/2023   Component Date Value Ref Range Status     WBC Count 03/31/2023 7.6  4.0 - 11.0 10e3/uL Final     RBC Count 03/31/2023 4.46  3.80 - 5.20 10e6/uL Final     Hemoglobin 03/31/2023 12.7  11.7 - 15.7 g/dL Final     Hematocrit 03/31/2023 38.4  35.0 - 47.0 % Final     MCV 03/31/2023 86  78 - 100 fL Final     MCH 03/31/2023 28.5  26.5 - 33.0 " pg Final     MCHC 03/31/2023 33.1  31.5 - 36.5 g/dL Final     RDW 03/31/2023 13.0  10.0 - 15.0 % Final     Platelet Count 03/31/2023 203  150 - 450 10e3/uL Final     Sodium 03/31/2023 140  133 - 144 mmol/L Final     Potassium 03/31/2023 4.3  3.4 - 5.3 mmol/L Final     Chloride 03/31/2023 111 (H)  94 - 109 mmol/L Final     Carbon Dioxide (CO2) 03/31/2023 26  20 - 32 mmol/L Final     Anion Gap 03/31/2023 3  3 - 14 mmol/L Final     Urea Nitrogen 03/31/2023 17  7 - 30 mg/dL Final     Creatinine 03/31/2023 0.75  0.52 - 1.04 mg/dL Final     Calcium 03/31/2023 8.9  8.5 - 10.1 mg/dL Final     Glucose 03/31/2023 99  70 - 99 mg/dL Final     Alkaline Phosphatase 03/31/2023 65  40 - 150 U/L Final     AST 03/31/2023 19  0 - 45 U/L Final     ALT 03/31/2023 16  0 - 50 U/L Final     Protein Total 03/31/2023 7.0  6.8 - 8.8 g/dL Final     Albumin 03/31/2023 3.7  3.4 - 5.0 g/dL Final     Bilirubin Total 03/31/2023 1.0  0.2 - 1.3 mg/dL Final     GFR Estimate 03/31/2023 >90  >60 mL/min/1.73m2 Final    eGFR calculated using 2021 CKD-EPI equation.     Cholesterol 03/31/2023 147  <200 mg/dL Final     Triglycerides 03/31/2023 116  <150 mg/dL Final     Direct Measure HDL 03/31/2023 53  >=50 mg/dL Final     LDL Cholesterol Calculated 03/31/2023 71  <=100 mg/dL Final     Non HDL Cholesterol 03/31/2023 94  <130 mg/dL Final     Patient Fasting > 8hrs? 03/31/2023 Yes   Final     Creatinine Urine mg/dL 03/31/2023 111  mg/dL Final     Albumin Urine mg/L 03/31/2023 10  mg/L Final     Albumin Urine mg/g Cr 03/31/2023 9.01  0.00 - 25.00 mg/g Cr Final         ASSESSMENT/PLAN:   Sindhu was seen today for physical.    Diagnoses and all orders for this visit:    Routine general medical examination at a health care facility  -     Colonoscopy Screening  Referral; Future  -     MA SCREENING DIGITAL BILAT - Future  (s+30); Future  -     REVIEW OF HEALTH MAINTENANCE PROTOCOL ORDERS  -     Pap Screen with HPV - recommended age 30 - 65  "years    Screen for colon cancer  -     Colonoscopy Screening  Referral; Future    Visit for screening mammogram  -     MA SCREENING DIGITAL BILAT - Future  (s+30); Future    Cervical cancer screening  -     Pap Screen with HPV - recommended age 30 - 65 years    Hypertension, goal below 140/90  -     metoprolol succinate ER (TOPROL XL) 50 MG 24 hr tablet; Take 1 tablet (50 mg) by mouth daily    S/P repair of coarctation of aorta  -     simvastatin (ZOCOR) 20 MG tablet; Take 1 tablet (20 mg) by mouth At Bedtime  -     aspirin (ASA) 81 MG chewable tablet; Take 1 tablet (81 mg) by mouth daily    Perennial allergic rhinitis  -     fluticasone (FLONASE) 50 MCG/ACT nasal spray; Spray 2 sprays into both nostrils daily    Hyperlipidemia LDL goal <100  -     simvastatin (ZOCOR) 20 MG tablet; Take 1 tablet (20 mg) by mouth At Bedtime    Other orders  -     Cancel: HEPATITIS B VACCINE ADULT 3 DOSE IM (ENGERIX-B/RECOMBIVAX HB)              COUNSELING:  Reviewed preventive health counseling, as reflected in patient instructions      BMI:   Estimated body mass index is 26.64 kg/m  as calculated from the following:    Height as of this encounter: 1.619 m (5' 3.75\").    Weight as of this encounter: 69.9 kg (154 lb).   Weight management plan: See AVS for healthy habits      She reports that she has never smoked. She has never been exposed to tobacco smoke. She has never used smokeless tobacco.      Sonia Rodriguez MD PhD  [facility]  "

## 2023-04-05 NOTE — PATIENT INSTRUCTIONS
Preventive Health Recommendations  Female Ages 40 to 49    Yearly exam:   See your health care provider every year in order to  Review health changes.   Discuss preventive care.    Review your medicines if your doctor prescribed any.    Get a Pap test every three years (unless you have an abnormal result and your provider advises testing more often).    If you get Pap tests with HPV test, you only need to test every 5 years, unless you have an abnormal result. You do not need a Pap test if your uterus was removed (hysterectomy) and you have not had cancer.    You should be tested each year for STDs (sexually transmitted diseases), if you're at risk.   Ask your doctor if you should have a mammogram.    Have a colonoscopy (test for colon cancer) if someone in your family has had colon cancer or polyps before age 50.     Have a cholesterol test every 5 years.     Have a diabetes test (fasting glucose) after age 45. If you are at risk for diabetes, you should have this test every 3 years.    Shots: Get a flu shot each year. Get a tetanus shot every 10 years.     Nutrition:   Eat at least 5 servings of fruits and vegetables each day.  Eat whole-grain bread, whole-wheat pasta and brown rice instead of white grains and rice.  Get adequate Calcium and Vitamin D.      Lifestyle  Exercise at least 150 minutes a week (an average of 30 minutes a day, 5 days a week). This will help you control your weight and prevent disease.  Limit alcohol to one drink per day.  No smoking.   Wear sunscreen to prevent skin cancer.  See your dentist every six months for an exam and cleaning.    10 Healthy Habits  Eat Better ? Move More ? Live Well   Eat breakfast daily.     Try to eat within the first hour after you wake up. This helps you control your appetite during the day, and you are less likely to snack in the evening.    80% of people who skip meals are overweight or obese.     2.   Include protein with every meal, even breakfast.     Protein will suppress your appetite longer than other types of food. This helps you  feel more satisfied with the amount of food you have eaten.     3.  Fill up on Fiber   Fiber comes from plants--fruits, veggies, whole grains, nuts/seeds and beans   Fiber is low in calories, high in phytonutrients and helps you stay full longer     4.  Eat S-L-O-W-L-Y   Take 20-30 minutes to eat each meal by taking small bites, chewing foods to applesauce consistency or 20-30 times before you swallow   Eating foods too fast can delay satiety/fullness signals and increase overeating      5.  Avoid Mindless Eating   Be present when you eat--take note of the smell, taste and quality of your food   Make a list of alternative activities you could do to prevent boredom/stress eating  Go for a walk, call a friend, chew gum, paint your nails     6.  Keep a Journal   Writing down what you eat, how you feel and when you are active helps you identify new changes to work on from week to week         Look for ways to cut 100 calories from your current diet 2-3 times per day     7.  Drink 64 ounces of Non Calorie drinks between meals   Water   Zero calorie Propel , Vitamin Water , Crystal Light    Avoid regular soda pop     8.  Stop thinking about food choices as a  diet.    Instead, think of them as healthy, lifelong habits--an effort toward a new way of eating.   Weigh yourself once a week instead of everyday.      9.  Get enough sleep--at least 7 to 8 hours each night.    Lack of sleep is one reason that fat builds up around the waist.     10.  Exercise five to six times per week    Do a combination aerobic exercise (fast walking, biking, swimming) and strength training (Dumbbells, pushups, weight machines, resistance bands).   Start at 10 minutes per day and slowly work your way up to 30 minutes or more.  Taking the stairs instead of the elevator, park at the far end of the parking lot, pace while on the phone, take the dog for a walk.       [email or web address]

## 2023-04-07 LAB
BKR LAB AP GYN ADEQUACY: NORMAL
BKR LAB AP GYN INTERPRETATION: NORMAL
BKR LAB AP HPV REFLEX: NORMAL
BKR LAB AP LMP: NORMAL
BKR LAB AP PREVIOUS ABNORMAL: NORMAL
PATH REPORT.COMMENTS IMP SPEC: NORMAL
PATH REPORT.COMMENTS IMP SPEC: NORMAL
PATH REPORT.RELEVANT HX SPEC: NORMAL

## 2023-04-11 LAB
HUMAN PAPILLOMA VIRUS 16 DNA: NEGATIVE
HUMAN PAPILLOMA VIRUS 18 DNA: NEGATIVE
HUMAN PAPILLOMA VIRUS FINAL DIAGNOSIS: NORMAL
HUMAN PAPILLOMA VIRUS OTHER HR: NEGATIVE

## 2023-04-30 ENCOUNTER — HEALTH MAINTENANCE LETTER (OUTPATIENT)
Age: 48
End: 2023-04-30

## 2024-05-04 ENCOUNTER — MYC REFILL (OUTPATIENT)
Dept: FAMILY MEDICINE | Facility: CLINIC | Age: 49
End: 2024-05-04
Payer: COMMERCIAL

## 2024-05-04 DIAGNOSIS — E78.5 HYPERLIPIDEMIA LDL GOAL <100: ICD-10-CM

## 2024-05-04 DIAGNOSIS — I10 HYPERTENSION, GOAL BELOW 140/90: ICD-10-CM

## 2024-05-04 DIAGNOSIS — J30.89 PERENNIAL ALLERGIC RHINITIS: ICD-10-CM

## 2024-05-04 DIAGNOSIS — Z87.74 S/P REPAIR OF COARCTATION OF AORTA: ICD-10-CM

## 2024-05-06 RX ORDER — FLUTICASONE PROPIONATE 50 MCG
2 SPRAY, SUSPENSION (ML) NASAL DAILY
Qty: 48 G | Refills: 0 | Status: SHIPPED | OUTPATIENT
Start: 2024-05-06

## 2024-05-06 RX ORDER — SIMVASTATIN 20 MG
20 TABLET ORAL AT BEDTIME
Qty: 90 TABLET | Refills: 3 | OUTPATIENT
Start: 2024-05-06

## 2024-05-06 RX ORDER — SIMVASTATIN 20 MG
20 TABLET ORAL AT BEDTIME
Qty: 90 TABLET | Refills: 0 | OUTPATIENT
Start: 2024-05-06

## 2024-05-06 RX ORDER — ASPIRIN 81 MG/1
81 TABLET, CHEWABLE ORAL DAILY
Qty: 30 TABLET | Refills: 0 | Status: SHIPPED | OUTPATIENT
Start: 2024-05-06

## 2024-05-06 RX ORDER — METOPROLOL SUCCINATE 50 MG/1
50 TABLET, EXTENDED RELEASE ORAL DAILY
Qty: 90 TABLET | Refills: 3 | OUTPATIENT
Start: 2024-05-06

## 2024-05-06 RX ORDER — METOPROLOL SUCCINATE 50 MG/1
50 TABLET, EXTENDED RELEASE ORAL DAILY
Qty: 90 TABLET | Refills: 0 | OUTPATIENT
Start: 2024-05-06

## 2024-05-06 NOTE — TELEPHONE ENCOUNTER
Overdue for needed care. Please call to schedule. Once appt is scheduled, route back to me: fasting lab within 1-2 weeks, physical within 3 months.

## 2024-05-07 NOTE — TELEPHONE ENCOUNTER
Appts scheduled.     Pt requested if she can get a milan fill. She will be out of this on Saturday.

## 2024-05-08 ENCOUNTER — DOCUMENTATION ONLY (OUTPATIENT)
Dept: LAB | Facility: CLINIC | Age: 49
End: 2024-05-08
Payer: COMMERCIAL

## 2024-05-08 DIAGNOSIS — I10 HYPERTENSION, GOAL BELOW 140/90: Primary | ICD-10-CM

## 2024-05-08 DIAGNOSIS — E78.5 HYPERLIPIDEMIA LDL GOAL <100: ICD-10-CM

## 2024-05-08 NOTE — PROGRESS NOTES
Sindhu RAAD Bennett has an upcoming lab appointment:    Future Appointments   Date Time Provider Department Center   5/14/2024  8:45 AM [facility] ONLY [facility]   5/16/2024  4:00 PM Sonia Rodriguez MD PhD [facility]     Patient is scheduled for the following lab(s):     There is no order available. Please review and place either future orders or HMPO (Review of Health Maintenance Protocol Orders), as appropriate.    Health Maintenance Due   Topic    BMP     LIPID     MICROALBUMIN     ANNUAL REVIEW OF HM ORDERS      Ayanna Lmia MLT

## 2024-05-10 RX ORDER — SIMVASTATIN 20 MG
20 TABLET ORAL AT BEDTIME
Qty: 90 TABLET | Refills: 0 | Status: SHIPPED | OUTPATIENT
Start: 2024-05-10 | End: 2024-05-16

## 2024-05-10 RX ORDER — METOPROLOL SUCCINATE 50 MG/1
50 TABLET, EXTENDED RELEASE ORAL DAILY
Qty: 90 TABLET | Refills: 0 | Status: SHIPPED | OUTPATIENT
Start: 2024-05-10 | End: 2024-05-16

## 2024-05-14 ENCOUNTER — LAB (OUTPATIENT)
Dept: LAB | Facility: CLINIC | Age: 49
End: 2024-05-14
Payer: COMMERCIAL

## 2024-05-14 DIAGNOSIS — I10 HYPERTENSION, GOAL BELOW 140/90: ICD-10-CM

## 2024-05-14 DIAGNOSIS — E78.5 HYPERLIPIDEMIA LDL GOAL <100: ICD-10-CM

## 2024-05-14 LAB
ALBUMIN SERPL BCG-MCNC: 4.5 G/DL (ref 3.5–5.2)
ALP SERPL-CCNC: 66 U/L (ref 40–150)
ALT SERPL W P-5'-P-CCNC: 13 U/L (ref 0–50)
ANION GAP SERPL CALCULATED.3IONS-SCNC: 9 MMOL/L (ref 7–15)
AST SERPL W P-5'-P-CCNC: 25 U/L (ref 0–45)
BILIRUB SERPL-MCNC: 0.7 MG/DL
BUN SERPL-MCNC: 14.7 MG/DL (ref 6–20)
CALCIUM SERPL-MCNC: 9.2 MG/DL (ref 8.6–10)
CHLORIDE SERPL-SCNC: 104 MMOL/L (ref 98–107)
CHOLEST SERPL-MCNC: 160 MG/DL
CREAT SERPL-MCNC: 0.76 MG/DL (ref 0.51–0.95)
CREAT UR-MCNC: 58 MG/DL
DEPRECATED HCO3 PLAS-SCNC: 25 MMOL/L (ref 22–29)
EGFRCR SERPLBLD CKD-EPI 2021: >90 ML/MIN/1.73M2
ERYTHROCYTE [DISTWIDTH] IN BLOOD BY AUTOMATED COUNT: 13.6 % (ref 10–15)
FASTING STATUS PATIENT QL REPORTED: ABNORMAL
FASTING STATUS PATIENT QL REPORTED: NORMAL
GLUCOSE SERPL-MCNC: 100 MG/DL (ref 70–99)
HCT VFR BLD AUTO: 38.9 % (ref 35–47)
HDLC SERPL-MCNC: 55 MG/DL
HGB BLD-MCNC: 12.7 G/DL (ref 11.7–15.7)
LDLC SERPL CALC-MCNC: 81 MG/DL
MCH RBC QN AUTO: 27.5 PG (ref 26.5–33)
MCHC RBC AUTO-ENTMCNC: 32.6 G/DL (ref 31.5–36.5)
MCV RBC AUTO: 84 FL (ref 78–100)
MICROALBUMIN UR-MCNC: <12 MG/L
MICROALBUMIN/CREAT UR: NORMAL MG/G{CREAT}
NONHDLC SERPL-MCNC: 105 MG/DL
PLATELET # BLD AUTO: 242 10E3/UL (ref 150–450)
POTASSIUM SERPL-SCNC: 5.3 MMOL/L (ref 3.4–5.3)
PROT SERPL-MCNC: 7.2 G/DL (ref 6.4–8.3)
RBC # BLD AUTO: 4.61 10E6/UL (ref 3.8–5.2)
SODIUM SERPL-SCNC: 138 MMOL/L (ref 135–145)
TRIGL SERPL-MCNC: 121 MG/DL
WBC # BLD AUTO: 7 10E3/UL (ref 4–11)

## 2024-05-14 PROCEDURE — 36415 COLL VENOUS BLD VENIPUNCTURE: CPT

## 2024-05-14 PROCEDURE — 85027 COMPLETE CBC AUTOMATED: CPT

## 2024-05-14 PROCEDURE — 80053 COMPREHEN METABOLIC PANEL: CPT

## 2024-05-14 PROCEDURE — 82570 ASSAY OF URINE CREATININE: CPT

## 2024-05-14 PROCEDURE — 82043 UR ALBUMIN QUANTITATIVE: CPT

## 2024-05-14 PROCEDURE — 80061 LIPID PANEL: CPT

## 2024-05-16 ENCOUNTER — ORDERS ONLY (AUTO-RELEASED) (OUTPATIENT)
Dept: FAMILY MEDICINE | Facility: CLINIC | Age: 49
End: 2024-05-16

## 2024-05-16 ENCOUNTER — OFFICE VISIT (OUTPATIENT)
Dept: FAMILY MEDICINE | Facility: CLINIC | Age: 49
End: 2024-05-16
Payer: COMMERCIAL

## 2024-05-16 VITALS
BODY MASS INDEX: 27.14 KG/M2 | SYSTOLIC BLOOD PRESSURE: 142 MMHG | HEIGHT: 64 IN | DIASTOLIC BLOOD PRESSURE: 80 MMHG | WEIGHT: 159 LBS | OXYGEN SATURATION: 99 % | HEART RATE: 76 BPM | RESPIRATION RATE: 14 BRPM | TEMPERATURE: 97 F

## 2024-05-16 DIAGNOSIS — E78.5 HYPERLIPIDEMIA LDL GOAL <100: ICD-10-CM

## 2024-05-16 DIAGNOSIS — Z12.11 SCREEN FOR COLON CANCER: ICD-10-CM

## 2024-05-16 DIAGNOSIS — Z00.00 ROUTINE GENERAL MEDICAL EXAMINATION AT A HEALTH CARE FACILITY: Primary | ICD-10-CM

## 2024-05-16 DIAGNOSIS — Z12.31 VISIT FOR SCREENING MAMMOGRAM: ICD-10-CM

## 2024-05-16 DIAGNOSIS — Z87.74 S/P REPAIR OF COARCTATION OF AORTA: ICD-10-CM
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
## 2024-05-16 DIAGNOSIS — S70.361A INSECT BITE OF RIGHT THIGH, INITIAL ENCOUNTER: ICD-10-CM

## 2024-05-16 DIAGNOSIS — I10 HYPERTENSION, GOAL BELOW 140/90: ICD-10-CM

## 2024-05-16 PROCEDURE — 99396 PREV VISIT EST AGE 40-64: CPT | Performed by: INTERNAL MEDICINE

## 2024-05-16 PROCEDURE — 99214 OFFICE O/P EST MOD 30 MIN: CPT | Mod: 25 | Performed by: INTERNAL MEDICINE

## 2024-05-16 RX ORDER — SIMVASTATIN 20 MG
20 TABLET ORAL AT BEDTIME
Qty: 90 TABLET | Refills: 2 | Status: SHIPPED | OUTPATIENT
Start: 2024-05-16

## 2024-05-16 RX ORDER — METOPROLOL SUCCINATE 50 MG/1
50 TABLET, EXTENDED RELEASE ORAL DAILY
Qty: 90 TABLET | Refills: 2 | Status: SHIPPED | OUTPATIENT
Start: 2024-05-16

## 2024-05-16 SDOH — HEALTH STABILITY: PHYSICAL HEALTH: ON AVERAGE, HOW MANY DAYS PER WEEK DO YOU ENGAGE IN MODERATE TO STRENUOUS EXERCISE (LIKE A BRISK WALK)?: 5 DAYS

## 2024-05-16 ASSESSMENT — SOCIAL DETERMINANTS OF HEALTH (SDOH): HOW OFTEN DO YOU GET TOGETHER WITH FRIENDS OR RELATIVES?: PATIENT DECLINED

## 2024-05-16 ASSESSMENT — PAIN SCALES - GENERAL: PAINLEVEL: NO PAIN (1)

## 2024-05-16 NOTE — PATIENT INSTRUCTIONS
"Preventive Care Advice   This is general advice we often give to help people stay healthy. Your care team may have specific advice just for you. Please talk to your care team about your own preventive care needs.  Lifestyle  Exercise at least 150 minutes each week (30 minutes a day, 5 days a week).  Do muscle strengthening activities 2 days a week. These help control your weight and prevent disease.  No smoking.  Wear sunscreen to prevent skin cancer.  Have your home tested for radon every 2 to 5 years. Radon is a colorless, odorless gas that can harm your lungs. To learn more, go to www.health.[facility]. and search for \"Radon in Homes.\"  Keep guns unloaded and locked up in a safe place like a safe or gun vault, or, use a gun lock and hide the keys. Always lock away bullets separately. To learn more, visit [employer].gov and search for \"safe gun storage.\"  Nutrition  Eat 5 or more servings of fruits and vegetables each day.  Try wheat bread, brown rice and whole grain pasta (instead of white bread, rice, and pasta).  Get enough calcium and vitamin D. Check the label on foods and aim for 100% of the RDA (recommended daily allowance).  Regular exams  Have a dental exam and cleaning every 6 months.  See your health care team every year to talk about:  Any changes in your health.  Any medicines your care team has prescribed.  Preventive care, family planning, and ways to prevent chronic diseases.  Shots (vaccines)   HPV shots (up to age 26), if you've never had them before.  Hepatitis B shots (up to age 59), if you've never had them before.  COVID-19 shot: Get this shot when it's due.  Flu shot: Get a flu shot every year.  Tetanus shot: Get a tetanus shot every 10 years.  Pneumococcal, hepatitis A, and RSV shots: Ask your care team if you need these based on your risk.  Shingles shot (for age 50 and up).  General health tests  Diabetes screening:  Starting at age 35, Get screened for diabetes at least every 3 years.  If " you are younger than age 35, ask your care team if you should be screened for diabetes.  Cholesterol test: At age 39, start having a cholesterol test every 5 years, or more often if advised.  Bone density scan (DEXA): At age 50, ask your care team if you should have this scan for osteoporosis (brittle bones).  Hepatitis C: Get tested at least once in your life.  Abdominal aortic aneurysm screening: Talk to your doctor about having this screening if you:  Have ever smoked; and  Are biologically male; and  Are between the ages of 65 and 75.  STIs (sexually transmitted infections)  Before age 24: Ask your care team if you should be screened for STIs.  After age 24: Get screened for STIs if you're at risk. You are at risk for STIs (including HIV) if:  You are sexually active with more than one person.  You don't use condoms every time.  You or a partner was diagnosed with a sexually transmitted infection.  If you are at risk for HIV, ask about PrEP medicine to prevent HIV.  Get tested for HIV at least once in your life, whether you are at risk for HIV or not.  Cancer screening tests  Cervical cancer screening: If you have a cervix, begin getting regular cervical cancer screening tests at age 21. Most people who have regular screenings with normal results can stop after age 65. Talk about this with your provider.  Breast cancer scan (mammogram): If you've ever had breasts, begin having regular mammograms starting at age 40. This is a scan to check for breast cancer.  Colon cancer screening: It is important to start screening for colon cancer at age 45.  Have a colonoscopy test every 10 years (or more often if you're at risk) Or, ask your provider about stool tests like a FIT test every year or Cologuard test every 3 years.  To learn more about your testing options, visit: www.[employer]/448508.pdf.  For help making a decision, visit: ivette/hf61488.  Prostate cancer screening test: If you have a prostate and are age 55  to 69, ask your provider if you would benefit from a yearly prostate cancer screening test.  Lung cancer screening: If you are a current or former smoker age 50 to 80, ask your care team if ongoing lung cancer screenings are right for you.  For informational purposes only. Not to replace the advice of your health care provider. Copyright   2023 [employer]. All rights reserved. Clinically reviewed by the [facility] Transitions Program. [employer] 965556 - REV 04/24.    Learning About Stress  What is stress?     Stress is your body's response to a hard situation. Your body can have a physical, emotional, or mental response. Stress is a fact of life for most people, and it affects everyone differently. What causes stress for you may not be stressful for someone else.  A lot of things can cause stress. You may feel stress when you go on a job interview, take a test, or run a race. This kind of short-term stress is normal and even useful. It can help you if you need to work hard or react quickly. For example, stress can help you finish an important job on time.  Long-term stress is caused by ongoing stressful situations or events. Examples of long-term stress include long-term health problems, ongoing problems at work, or conflicts in your family. Long-term stress can harm your health.  How does stress affect your health?  When you are stressed, your body responds as though you are in danger. It makes hormones that speed up your heart, make you breathe faster, and give you a burst of energy. This is called the fight-or-flight stress response. If the stress is over quickly, your body goes back to normal and no harm is done.  But if stress happens too often or lasts too long, it can have bad effects. Long-term stress can make you more likely to get sick, and it can make symptoms of some diseases worse. If you tense up when you are stressed, you may develop neck, shoulder, or low back pain. Stress is  linked to high blood pressure and heart disease.  Stress also harms your emotional health. It can make you spaulding, tense, or depressed. Your relationships may suffer, and you may not do well at work or school.  What can you do to manage stress?  You can try these things to help manage stress:   Do something active. Exercise or activity can help reduce stress. Walking is a great way to get started. Even everyday activities such as housecleaning or yard work can help.  Try yoga or chirag chi. These techniques combine exercise and meditation. You may need some training at first to learn them.  Do something you enjoy. For example, listen to music or go to a movie. Practice your hobby or do volunteer work.  Meditate. This can help you relax, because you are not worrying about what happened before or what may happen in the future.  Do guided imagery. Imagine yourself in any setting that helps you feel calm. You can use online videos, books, or a teacher to guide you.  Do breathing exercises. For example:  From a standing position, bend forward from the waist with your knees slightly bent. Let your arms dangle close to the floor.  Breathe in slowly and deeply as you return to a standing position. Roll up slowly and lift your head last.  Hold your breath for just a few seconds in the standing position.  Breathe out slowly and bend forward from the waist.  Let your feelings out. Talk, laugh, cry, and express anger when you need to. Talking with supportive friends or family, a counselor, or a eldon leader about your feelings is a healthy way to relieve stress. Avoid discussing your feelings with people who make you feel worse.  Write. It may help to write about things that are bothering you. This helps you find out how much stress you feel and what is causing it. When you know this, you can find better ways to cope.  What can you do to prevent stress?  You might try some of these things to help prevent stress:  Manage your time.  "This helps you find time to do the things you want and need to do.  Get enough sleep. Your body recovers from the stresses of the day while you are sleeping.  Get support. Your family, friends, and community can make a difference in how you experience stress.  Limit your news feed. Avoid or limit time on social media or news that may make you feel stressed.  Do something active. Exercise or activity can help reduce stress. Walking is a great way to get started.  Where can you learn more?  Go to https://www.[employer].net/patiented  Enter N032 in the search box to learn more about \"Learning About Stress.\"  Current as of: October 24, 2023               Content Version: 14.0    9019-9901 [employer].   Care instructions adapted under license by your healthcare professional. If you have questions about a medical condition or this instruction, always ask your healthcare professional. [employer] disclaims any warranty or liability for your use of this information.      "

## 2024-05-16 NOTE — PROGRESS NOTES
"Preventive Care Visit  [facility] GREG RUBIO  Sonia Rodriguez MD PhD, Internal Medicine - Pediatrics  May 16, 2024      Assessment & Plan     Sindhu was seen today for physical and recheck medication.    Diagnoses and all orders for this visit:    Routine general medical examination at a health care facility    Screen for colon cancer  -     SAMANTA(EXACT SCIENCES); Future    Visit for screening mammogram  -     MA SCREENING DIGITAL BILAT - Future  (s+30); Future    S/P repair of coarctation of aorta  -     simvastatin (ZOCOR) 20 MG tablet; Take 1 tablet (20 mg) by mouth at bedtime    Hyperlipidemia LDL goal <100  -     simvastatin (ZOCOR) 20 MG tablet; Take 1 tablet (20 mg) by mouth at bedtime    Hypertension, goal below 140/90  Comments:  home BP is better. pt will Mychart update home BP  Orders:  -     metoprolol succinate ER (TOPROL XL) 50 MG 24 hr tablet; Take 1 tablet (50 mg) by mouth daily    Insect bite of right thigh, initial encounter  Comments:  posterior thigh. looked bruised and irretated, not cellulitic.    Other orders  -     REVIEW OF HEALTH MAINTENANCE PROTOCOL ORDERS  -     PRIMARY CARE FOLLOW-UP SCHEDULING; Future           BMI  Estimated body mass index is 27.29 kg/m  as calculated from the following:    Height as of this encounter: 1.626 m (5' 4\").    Weight as of this encounter: 72.1 kg (159 lb).   Weight management plan: Discussed healthy diet and exercise guidelines    Counseling  Appropriate preventive services were discussed with this patient, including applicable screening as appropriate for fall prevention, nutrition, physical activity, Tobacco-use cessation, weight loss and cognition.  Checklist reviewing preventive services available has been given to the patient.  Reviewed patient's diet, addressing concerns and/or questions.   The patient was instructed to see the dentist every 6 months.   She is at risk for psychosocial distress and has been provided with information to " reduce risk.       Return in one year.     Subjective   Sindhu is a 48 year old, presenting for the following:  Physical and Recheck Medication        5/16/2024     3:32 PM   Additional Questions   Roomed by Navi   Accompanied by self         5/16/2024     3:32 PM   Patient Reported Additional Medications   Patient reports taking the following new medications no        Health Care Directive  Patient does not have a Health Care Directive or Living Will: Discussed advance care planning with patient; however, patient declined at this time.      Sindhu Bennett is a 48 year old female who has S/P repair of coarctation of aorta and Hypertension, goal below 140/90 on their pertinent problem list.      Doing well.   Had ear infection issue last fall and had influenza this winter.               5/16/2024   General Health   How would you rate your overall physical health? Excellent   Feel stress (tense, anxious, or unable to sleep) Only a little   (!) STRESS CONCERN      5/16/2024   Nutrition   Three or more servings of calcium each day? Yes   Diet: Low salt   How many servings of fruit and vegetables per day? (!) 2-3   How many sweetened beverages each day? 0-1         5/16/2024   Exercise   Days per week of moderate/strenous exercise 5 days         5/16/2024   Social Factors   Frequency of gathering with friends or relatives Patient declined   Worry food won't last until get money to buy more No   Food not last or not have enough money for food? No   Do you have housing?  Yes   Are you worried about losing your housing? No   Lack of transportation? No   Unable to get utilities (heat,electricity)? No         5/16/2024   Dental   Dentist two times every year? (!) NO         5/16/2024   TB Screening   Were you born outside of the [place]? No         Today's PHQ-2 Score:       5/16/2024     3:27 PM   PHQ-2 ( 1999 [employer])   Q1: Little interest or pleasure in doing things 0   Q2: Feeling down, depressed or hopeless 0  "  PHQ-2 Score 0   Q1: Little interest or pleasure in doing things Not at all   Q2: Feeling down, depressed or hopeless Not at all   PHQ-2 Score 0           5/16/2024   Substance Use   Alcohol more than 3/day or more than 7/wk No   Do you use any other substances recreationally? No     Social History     Tobacco Use    Smoking status: Never     Passive exposure: Never    Smokeless tobacco: Never   Vaping Use    Vaping status: Never Used   Substance Use Topics    Alcohol use: Yes     Comment: occ.    Drug use: No           4/5/2023   LAST FHS-7 RESULTS   1st degree relative breast or ovarian cancer Yes   Any relative bilateral breast cancer No                5/16/2024   STI Screening   New sexual partner(s) since last STI/HIV test? No     History of abnormal Pap smear: No        Latest Ref Rng & Units 4/5/2023    10:07 AM 2/8/2018     8:26 AM 2/8/2018     8:21 AM   PAP / HPV   PAP  Negative for Intraepithelial Lesion or Malignancy (NILM)      PAP (Historical)    NIL    HPV 16 DNA Negative Negative  Negative     HPV 18 DNA Negative Negative  Negative     Other HR HPV Negative Negative  Negative       ASCVD Risk   The ASCVD Risk score (Preet CACERES, et al., 2019) failed to calculate for the following reasons:    The patient has a prior MI or stroke diagnosis        5/16/2024   Contraception/Family Planning   Questions about contraception or family planning No        Reviewed and updated as needed this visit by Provider                             Objective    Exam  BP (!) 142/80   Pulse 76   Temp 97  F (36.1  C) (Temporal)   Resp 14   Ht 1.626 m (5' 4\")   Wt 72.1 kg (159 lb)   SpO2 99%   BMI 27.29 kg/m     Estimated body mass index is 27.29 kg/m  as calculated from the following:    Height as of this encounter: 1.626 m (5' 4\").    Weight as of this encounter: 72.1 kg (159 lb). Second BP: 151/101    Physical Exam          Signed Electronically by: Sonia Rodriguez MD PhD    "

## 2024-06-07 LAB — NONINV COLON CA DNA+OCC BLD SCRN STL QL: NEGATIVE

## 2024-06-11 ENCOUNTER — TELEPHONE (OUTPATIENT)
Dept: FAMILY MEDICINE | Facility: CLINIC | Age: 49
End: 2024-06-11
Payer: COMMERCIAL

## 2024-06-11 ENCOUNTER — MYC MEDICAL ADVICE (OUTPATIENT)
Dept: FAMILY MEDICINE | Facility: CLINIC | Age: 49
End: 2024-06-11
Payer: COMMERCIAL

## 2024-06-11 NOTE — TELEPHONE ENCOUNTER
Patient Returning Call    Reason for call:  Patient refuse the appt give me her BP number 132/77 taken 06/05/24   Information relayed to patient:      Patient has additional questions:  Yes    What are your questions/concerns:  when call back     Who does the patient want to speak with:      Is an  needed?:  No      Could we send this information to you in [employer] or would you prefer to receive a phone call?:   Patient would prefer a phone call   Okay to leave a detailed message?: Yes at Cell number on file:    Telephone Information:   Mobile [phone]

## 2024-06-11 NOTE — TELEPHONE ENCOUNTER
Patient Quality Outreach    Patient is due for the following:   Hypertension -  BP check    Next Steps:   Schedule a nurse only visit for Blood Pressure Check     Type of outreach:    Sent [employer] message.    Next Steps:  Reach out within 90 days via [employer].    Max number of attempts reached: Yes. Will try again in 90 days if patient still on fail list.    Questions for provider review:    None           Rosalee Craven

## 2024-07-07 ENCOUNTER — HEALTH MAINTENANCE LETTER (OUTPATIENT)
Age: 49
End: 2024-07-07

## 2024-08-01 ENCOUNTER — MYC REFILL (OUTPATIENT)
Dept: FAMILY MEDICINE | Facility: CLINIC | Age: 49
End: 2024-08-01
Payer: COMMERCIAL

## 2024-08-01 DIAGNOSIS — I10 HYPERTENSION, GOAL BELOW 140/90: ICD-10-CM

## 2024-08-01 DIAGNOSIS — E78.5 HYPERLIPIDEMIA LDL GOAL <100: ICD-10-CM

## 2024-08-01 DIAGNOSIS — Z87.74 S/P REPAIR OF COARCTATION OF AORTA: ICD-10-CM

## 2024-08-02 RX ORDER — SIMVASTATIN 20 MG
20 TABLET ORAL AT BEDTIME
Qty: 90 TABLET | Refills: 2 | OUTPATIENT
Start: 2024-08-02

## 2024-08-02 RX ORDER — METOPROLOL SUCCINATE 50 MG/1
50 TABLET, EXTENDED RELEASE ORAL DAILY
Qty: 90 TABLET | Refills: 2 | OUTPATIENT
Start: 2024-08-02

## 2024-08-14 ENCOUNTER — OFFICE VISIT (OUTPATIENT)
Dept: OBGYN | Facility: CLINIC | Age: 49
End: 2024-08-14
Attending: OBSTETRICS & GYNECOLOGY
Payer: COMMERCIAL

## 2024-08-14 VITALS
OXYGEN SATURATION: 100 % | SYSTOLIC BLOOD PRESSURE: 166 MMHG | BODY MASS INDEX: 28.49 KG/M2 | DIASTOLIC BLOOD PRESSURE: 102 MMHG | HEART RATE: 76 BPM | WEIGHT: 166 LBS

## 2024-08-14 DIAGNOSIS — N89.8 VAGINAL MASS: Primary | ICD-10-CM

## 2024-08-14 PROCEDURE — 99203 OFFICE O/P NEW LOW 30 MIN: CPT | Performed by: OBSTETRICS & GYNECOLOGY

## 2024-08-14 NOTE — PROGRESS NOTES
This 47 y/o female, , LMP 2024, using 's vas for contraception, presents as a new patient to the [facility] de[facility] c/o a marble-sized mass involving a localized area of the left lateral vaginal sidewall just superior to her Bartholin gland.  She first felt this while in the shower and after wiping this morning.  This is a new issue and she denies any previous occurrences.  Her  has a hx of HSV but she denies any herpetic outbreaks herself and is not taking any anti-viral for prevention.  Her last co-test result was normal on 2023 so is next due in 2026.  She denies any risk of STD exposure and has not noted any vaginal drainage nor pain.  When re-palpating the area, she states that the mass is much smaller currently so it appears to have shrunk since this morning.  She would like a letter for work to cover her absence this afternoon.  BP (!) 166/102 (BP Location: Right arm, Patient Position: Chair, Cuff Size: Adult Regular)   Pulse 76   Wt 75.3 kg (166 lb)   LMP 2024 (Exact Date)   SpO2 100%   Breastfeeding No   BMI 28.49 kg/m    ROS:  10 systems were reviewed and the positives were listed under problems.  In the lithotomy position, a bi-valve speculum was placed and her cervix and vaginal mucosa were inspected and found to be normal.  No growth or lesions were visualized.  She has a pea-sized mass superior to the Bartholin's gland involving the left lateral vaginal sidewall but there is no discharge nor tenderness to palpation.  She has +1 uterine descensus on exam.    Assessment - Vaginal mass which appears to be spontaneously resolving, mild uterine descensus  Plan - Will continue to observe since I am unable to find an abnormality so I&D is not indicated at this time.  However, if she develops a Bartholin's abscess, then the plan will be to refer her to gyn onc since she is older than age 45.  She will follow up with FP for her elevated BP reading.  A letter for work  was provided and all her questions and concerns were addressed.  30 minutes were spent today in chart review, the patient visit, review of tests, and documentation in regard to the issues noted above.

## 2024-08-14 NOTE — LETTER
August 14, 2024      Sindhu Bennett  [address] [address]        To Whom It May Concern:    Sindhu Bennett  was seen today at this clinic for medical care.  Please excuse her from work until 8/15/2024.        Sincerely,          Samantha Mathews DO  FACOG, FACS

## 2024-08-14 NOTE — PATIENT INSTRUCTIONS
If you have labs or imaging done, the results will automatically release in Triage without an interpretation.  Your health care professional will review those results and send an interpretation with recommendations as soon as possible, but this may be 1-3 business days.    If you have any questions regarding your visit, please contact your care team.     [employer] [facility]: [phone]  [facility] CLINIC HOURS TELEPHONE NUMBER   DO. Roxane Pleitez -Surgery Scheduler  Daniela -     JG Suh RN Kylie, RN [place]    Monday 8:30 am-5:00 pm  Wednesday 8:30 am-5:00 pm  Friday 8:30 am-5:00 pm    Typical Surgery day: Tuesday [facility]  [address] [place] [address]  Phone:  [phone]  Fax: [phone]   Appointment [phone]    Imaging Scheduling-All Locations [phone]    [facility]  [address] [place] [address] [place] [address] [phone]   **Surgeries** Our Surgery Schedulers will contact you to schedule. If you do not receive a call within 3 business days, please call [phone].  Urgent Care locations:  [facility] Monday-Friday   10 am - 8 pm  Saturday and    9 am - 5 pm [phone]   If you need a medication refill, please contact your pharmacy. Please allow 3 business days for your refill to be completed.  As always, Thank you for trusting us with your healthcare needs!  see additional instructions from your care team below

## 2024-09-30 NOTE — RESULT ENCOUNTER NOTE
Addended by: JUAN M CASTELLON on: 9/30/2024 02:11 PM     Modules accepted: Orders     Patient has reviewed their COVID-19 lab results via [employer]

## 2024-10-12 NOTE — NURSING NOTE
"Chief Complaint   Patient presents with     Hospital F/U       Initial /84  Pulse 70  Temp 96.5  F (35.8  C) (Temporal)  Wt 142 lb (64.4 kg)  SpO2 100%  BMI 24.36 kg/m2 Estimated body mass index is 24.36 kg/(m^2) as calculated from the following:    Height as of 10/12/17: 5' 4.02\" (1.626 m).    Weight as of this encounter: 142 lb (64.4 kg).  Medication Reconciliation: complete    " Freedom Badillo(Attending)

## 2024-10-22 ENCOUNTER — ANCILLARY PROCEDURE (OUTPATIENT)
Dept: GENERAL RADIOLOGY | Facility: CLINIC | Age: 49
End: 2024-10-22
Attending: PHYSICIAN ASSISTANT
Payer: COMMERCIAL

## 2024-10-22 ENCOUNTER — OFFICE VISIT (OUTPATIENT)
Dept: FAMILY MEDICINE | Facility: CLINIC | Age: 49
End: 2024-10-22
Payer: COMMERCIAL

## 2024-10-22 VITALS
HEART RATE: 74 BPM | OXYGEN SATURATION: 100 % | RESPIRATION RATE: 16 BRPM | DIASTOLIC BLOOD PRESSURE: 80 MMHG | HEIGHT: 64 IN | WEIGHT: 160.2 LBS | TEMPERATURE: 98.9 F | BODY MASS INDEX: 27.35 KG/M2 | SYSTOLIC BLOOD PRESSURE: 132 MMHG

## 2024-10-22 DIAGNOSIS — R05.2 SUBACUTE COUGH: ICD-10-CM

## 2024-10-22 DIAGNOSIS — J40 BRONCHITIS: Primary | ICD-10-CM

## 2024-10-22 PROCEDURE — 71046 X-RAY EXAM CHEST 2 VIEWS: CPT | Mod: TC | Performed by: RADIOLOGY

## 2024-10-22 PROCEDURE — 99213 OFFICE O/P EST LOW 20 MIN: CPT | Performed by: PHYSICIAN ASSISTANT

## 2024-10-22 RX ORDER — BENZONATATE 100 MG/1
100 CAPSULE ORAL 3 TIMES DAILY PRN
Qty: 21 CAPSULE | Refills: 0 | Status: SHIPPED | OUTPATIENT
Start: 2024-10-22

## 2024-10-22 RX ORDER — PREDNISONE 20 MG/1
40 TABLET ORAL DAILY
Qty: 10 TABLET | Refills: 0 | Status: SHIPPED | OUTPATIENT
Start: 2024-10-22 | End: 2024-10-27

## 2024-10-22 RX ORDER — ALBUTEROL SULFATE 90 UG/1
2 INHALANT RESPIRATORY (INHALATION) EVERY 6 HOURS PRN
Qty: 18 G | Refills: 0 | Status: SHIPPED | OUTPATIENT
Start: 2024-10-22 | End: 2024-11-21

## 2024-10-22 RX ORDER — AZITHROMYCIN 250 MG/1
TABLET, FILM COATED ORAL
Qty: 6 TABLET | Refills: 0 | Status: SHIPPED | OUTPATIENT
Start: 2024-10-22 | End: 2024-10-27

## 2024-10-22 ASSESSMENT — PAIN SCALES - GENERAL: PAINLEVEL: NO PAIN (0)

## 2024-10-22 ASSESSMENT — ENCOUNTER SYMPTOMS: COUGH: 1

## 2024-10-22 NOTE — PROGRESS NOTES
Assessment & Plan     (J40) Bronchitis  (primary encounter diagnosis)  Comment: Patient with 6 weeks of cough.  Chest x-ray no obvious infiltrate on independent review.  I discussed with patient given longevity of symptoms covering with azithromycin as well as prednisone.  She does have slight wheeze on examination.  Her children have history of asthma the patient herself has a history of seasonal allergies as well as eczema.  Did discuss with her at length possibility of underlying reactive airway disease.  Albuterol inhaler as well as Tessalon for cough.  Discussed warning signs to watch for.  Return if no improvement in 7 days.  Plan: XR Chest 2 Views, azithromycin (ZITHROMAX) 250         MG tablet, albuterol (PROAIR HFA/PROVENTIL         HFA/VENTOLIN HFA) 108 (90 Base) MCG/ACT         inhaler, benzonatate (TESSALON) 100 MG capsule,        predniSONE (DELTASONE) 20 MG tablet          Steve Manley is a 49 year old, presenting for the following health issues:  Cough (Cough for 2-3 weeks, comes and goes. Pt said that she's starting to have pain in her lung.)      10/22/2024     9:40 AM   Additional Questions   Roomed by ROSIE DEWITT   Accompanied by  (PIOTR)     Via the Health Maintenance questionnaire, the patient has reported the following services have been completed -Mammogram: [place] [facility] [address] in [place] 2022-05-12, this information has not been sent to the abstraction team.  History of Present Illness       Reason for visit:  Cough lasting two weeks and not improving  Symptom onset:  1-2 weeks ago   She is taking medications regularly.     Early October patient developed URI symptoms with this was having cough, chills, fever.. Fevers and chills have since subsided.  No current ear pain or sore throat.  No vomiting or diarrhea.  Patient notes her son was ill with similar symptoms and was treated for possible pertussis, however, his testing did return negative.  Patient has  "had ongoing cough that is exacerbated with seasonal allergies taking seasonal allergy medication without relief.  Developed some irritation along the chest wall with coughing in the last 36 hours.  No hemoptysis or leg swelling.  No history of asthma or reactive airway disease.    Review of Systems  Constitutional, neuro, ENT, endocrine, pulmonary, cardiac, gastrointestinal, genitourinary, musculoskeletal, integument and psychiatric systems are negative, except as otherwise noted.      Objective    /80   Pulse 74   Temp 98.9  F (37.2  C) (Tympanic)   Resp 16   Ht 1.626 m (5' 4\")   Wt 72.7 kg (160 lb 3.2 oz)   LMP 10/18/2024   SpO2 100%   BMI 27.50 kg/m    Body mass index is 27.5 kg/m .  Physical Exam   GENERAL: alert and no distress  EYES: Eyes grossly normal to inspection, PERRL and conjunctivae and sclerae normal  HENT: ear canals and TM's normal, nose and mouth without ulcers or lesions  NECK: no adenopathy, no asymmetry, masses, or scars  RESP: Frequent cough during examination.  Expiratory wheeze right lung base.  remainder of lungs clear to auscultation - no rales, rhonchi    CV: regular rates and rhythm, normal S1 S2, no S3 or S4, no murmur, click or rub, peripheral pulses strong, and no peripheral edema  ABDOMEN: soft, nontender, no hepatosplenomegaly, no masses and bowel sounds normal  MS: no gross musculoskeletal defects noted, no edema    Xray - Reviewed and interpreted by me.  Subtle increase of bronchiole over the left upper lobe without obvious infiltrate.         Signed Electronically by: Edward Ramos PA-C    "

## 2024-10-22 NOTE — LETTER
October 22, 2024      Sindhu Bennett  [address] [address]        To Whom It May Concern:    Sindhu Bennett  was seen on 10/22/2024.  Please excuse her  until 10/24/2024 due to illness. May return sooner if feeling improved.         Sincerely,        Edward Ramos PA-C

## 2024-11-01 ENCOUNTER — MYC REFILL (OUTPATIENT)
Dept: FAMILY MEDICINE | Facility: CLINIC | Age: 49
End: 2024-11-01
Payer: COMMERCIAL

## 2024-11-01 DIAGNOSIS — Z87.74 S/P REPAIR OF COARCTATION OF AORTA: ICD-10-CM

## 2024-11-01 DIAGNOSIS — J30.89 PERENNIAL ALLERGIC RHINITIS: ICD-10-CM

## 2024-11-01 DIAGNOSIS — I10 HYPERTENSION, GOAL BELOW 140/90: ICD-10-CM

## 2024-11-04 DIAGNOSIS — Z87.74 S/P REPAIR OF COARCTATION OF AORTA: ICD-10-CM

## 2024-11-04 DIAGNOSIS — J30.89 PERENNIAL ALLERGIC RHINITIS: ICD-10-CM

## 2024-11-04 RX ORDER — METOPROLOL SUCCINATE 50 MG/1
50 TABLET, EXTENDED RELEASE ORAL DAILY
Qty: 90 TABLET | Refills: 2 | OUTPATIENT
Start: 2024-11-04

## 2024-11-04 RX ORDER — FLUTICASONE PROPIONATE 50 MCG
2 SPRAY, SUSPENSION (ML) NASAL DAILY
Qty: 48 G | Refills: 0 | OUTPATIENT
Start: 2024-11-04

## 2024-11-04 RX ORDER — ASPIRIN 81 MG/1
81 TABLET, CHEWABLE ORAL DAILY
Qty: 90 TABLET | Refills: 2 | Status: SHIPPED | OUTPATIENT
Start: 2024-11-04

## 2024-11-04 RX ORDER — ASPIRIN 81 MG
TABLET,CHEWABLE ORAL
Qty: 30 TABLET | Refills: 0 | OUTPATIENT
Start: 2024-11-04

## 2024-11-04 RX ORDER — FLUTICASONE PROPIONATE 50 MCG
2 SPRAY, SUSPENSION (ML) NASAL DAILY
Qty: 48 G | Refills: 2 | Status: SHIPPED | OUTPATIENT
Start: 2024-11-04

## 2024-11-06 ENCOUNTER — OFFICE VISIT (OUTPATIENT)
Dept: URGENT CARE | Facility: URGENT CARE | Age: 49
End: 2024-11-06
Payer: COMMERCIAL

## 2024-11-06 VITALS
BODY MASS INDEX: 27.12 KG/M2 | HEART RATE: 69 BPM | OXYGEN SATURATION: 100 % | DIASTOLIC BLOOD PRESSURE: 92 MMHG | WEIGHT: 158 LBS | RESPIRATION RATE: 16 BRPM | SYSTOLIC BLOOD PRESSURE: 154 MMHG | TEMPERATURE: 98 F

## 2024-11-06 DIAGNOSIS — S61.412A LACERATION OF LEFT HAND WITHOUT FOREIGN BODY, INITIAL ENCOUNTER: Primary | ICD-10-CM

## 2024-11-06 PROCEDURE — 90715 TDAP VACCINE 7 YRS/> IM: CPT | Performed by: PHYSICIAN ASSISTANT

## 2024-11-06 PROCEDURE — 90471 IMMUNIZATION ADMIN: CPT | Performed by: PHYSICIAN ASSISTANT

## 2024-11-06 PROCEDURE — 12002 RPR S/N/AX/GEN/TRNK2.6-7.5CM: CPT | Performed by: PHYSICIAN ASSISTANT

## 2024-11-06 ASSESSMENT — ENCOUNTER SYMPTOMS
RHINORRHEA: 0
ALLERGIC/IMMUNOLOGIC NEGATIVE: 1
WOUND: 1
NAUSEA: 0
VOMITING: 0
BACK PAIN: 0
HEADACHES: 0
CHILLS: 0
MYALGIAS: 0
JOINT SWELLING: 0
ENDOCRINE NEGATIVE: 1
WEAKNESS: 0
RESPIRATORY NEGATIVE: 1
NECK STIFFNESS: 0
ARTHRALGIAS: 0
MUSCULOSKELETAL NEGATIVE: 1
DIARRHEA: 0
COUGH: 0
EYES NEGATIVE: 1
LIGHT-HEADEDNESS: 0
DIZZINESS: 0
FEVER: 0
CARDIOVASCULAR NEGATIVE: 1
SHORTNESS OF BREATH: 0
SORE THROAT: 0
NECK PAIN: 0
PALPITATIONS: 0

## 2024-11-06 NOTE — PROGRESS NOTES
Chief Complaint:     Chief Complaint   Patient presents with    Urgent Care    Laceration     Per patient states she accidentally cut her left palm this morning while making breakfast         Assessment:     1. Laceration of left hand without foreign body, initial encounter         Plan:    Imaging of the injured area for foreign body or fracture was not  Indicated    Wound is more superficial in nature and was closed using glue.    Patient given Tetanus booster in clinic today.    Wound was irrigated with water before repair.       Discussed home wound care. Return to  with increased swelling, pain, redness, pus or fevers.    Patient was discharged in stable condition.  Patient verbalized understanding and agreed with this plan.      SUBJECTIVE     HPI: Sindhu Bennett is an 49 year old female that presents to clinic after cutting self on the L palm with a knife. She denies numbness of the hand. She denies dysfunction of the hand. Patient denies any loss of strength to the hand.  Patient is not up to date on tetanus.     Review of Systems:  Review of Systems   Constitutional:  Negative for chills and fever.   HENT:  Negative for congestion, ear pain, rhinorrhea and sore throat.    Eyes: Negative.    Respiratory: Negative.  Negative for cough and shortness of breath.    Cardiovascular: Negative.  Negative for chest pain and palpitations.   Gastrointestinal:  Negative for diarrhea, nausea and vomiting.   Endocrine: Negative.    Genitourinary: Negative.    Musculoskeletal: Negative.  Negative for arthralgias, back pain, joint swelling, myalgias, neck pain and neck stiffness.   Skin:  Positive for wound. Negative for rash.   Allergic/Immunologic: Negative.  Negative for immunocompromised state.   Neurological:  Negative for dizziness, weakness, light-headedness and headaches.         Family History   Family History   Problem Relation Age of Onset    Hypertension Mother     Diabetes Father     Hypertension  Father     Cancer - colorectal Maternal Grandfather     C.A.D. Maternal Grandfather     Diabetes Maternal Grandfather     Hypertension Maternal Grandfather     C.A.D. Maternal Uncle     Cerebrovascular Disease Maternal Uncle     Hypertension Maternal Uncle     Diabetes Maternal Uncle     Diabetes Maternal Grandmother     Multiple Sclerosis Maternal Grandmother     Hypertension Brother     Colon Cancer Paternal Grandfather     Coronary Artery Disease Paternal Uncle     Cervical Cancer Sister 46        stage 4 and now  .     Asthma No family hx of     Breast Cancer No family hx of     Prostate Cancer No family hx of     Thyroid Disease No family hx of     Genetic Disorder No family hx of         Problem history  Patient Active Problem List   Diagnosis    HSV infection    S/P repair of coarctation of aorta    Hypertension, goal below 140/90    Environmental allergies    Third degree hemorrhoids    H/O endometritis    Hyperlipidemia LDL goal <100        Allergies  Allergies   Allergen Reactions    Penicillins Rash     Was a very red facial rash    Erythromycin      Severe vomiting    Levaquin [Levofloxacin]     Nitroglycerin     Doxycycline Rash    Toradol [Ketorolac Tromethamine]         Social History  Social History     Socioeconomic History    Marital status:      Spouse name: Not on file    Number of children: 3    Years of education: Not on file    Highest education level: Not on file   Occupational History    Occupation: home    Tobacco Use    Smoking status: Never     Passive exposure: Never    Smokeless tobacco: Never   Vaping Use    Vaping status: Never Used   Substance and Sexual Activity    Alcohol use: Yes     Comment: occ.    Drug use: No    Sexual activity: Yes     Partners: Male     Birth control/protection: Male Surgical   Other Topics Concern    Parent/sibling w/ CABG, MI or angioplasty before 65F 55M? Yes     Comment: uncle my moms brother     Service  Not Asked    Blood Transfusions Not Asked    Caffeine Concern No     Comment: 10 oz coffee a day    Occupational Exposure Not Asked    Hobby Hazards Not Asked    Sleep Concern Not Asked    Stress Concern Not Asked    Weight Concern Not Asked    Special Diet Not Asked    Back Care Not Asked    Exercise Yes     Comment: walking 2-3 miles a day    Bike Helmet Not Asked    Seat Belt Not Asked    Self-Exams Not Asked   Social History Narrative    Not on file     Social Drivers of Health     Financial Resource Strain: Low Risk  (5/16/2024)    Financial Resource Strain     Within the past 12 months, have you or your family members you live with been unable to get utilities (heat, electricity) when it was really needed?: No   Food Insecurity: Low Risk  (5/16/2024)    Food Insecurity     Within the past 12 months, did you worry that your food would run out before you got money to buy more?: No     Within the past 12 months, did the food you bought just not last and you didn t have money to get more?: No   Transportation Needs: Low Risk  (5/16/2024)    Transportation Needs     Within the past 12 months, has lack of transportation kept you from medical appointments, getting your medicines, non-medical meetings or appointments, work, or from getting things that you need?: No   Physical Activity: Unknown (5/16/2024)    Exercise Vital Sign     Days of Exercise per Week: 5 days     Minutes of Exercise per Session: Not on file   Stress: No Stress Concern Present (5/16/2024)    [employer] - Occupational Stress Questionnaire     Feeling of Stress : Only a little   Social Connections: Unknown (5/16/2024)    Social Connection and Isolation Panel [NHANES]     Frequency of Communication with Friends and Family: Not on file     Frequency of Social Gatherings with Friends and Family: Patient declined     Attends Mandaen Services: Not on file     Active Member of Clubs or Organizations: Not on file      Attends Club or Organization Meetings: Not on file     Marital Status: Not on file   Interpersonal Safety: Low Risk  (5/16/2024)    Interpersonal Safety     Do you feel physically and emotionally safe where you currently live?: Yes     Within the past 12 months, have you been hit, slapped, kicked or otherwise physically hurt by someone?: No     Within the past 12 months, have you been humiliated or emotionally abused in other ways by your partner or ex-partner?: No   Housing Stability: Low Risk  (5/16/2024)    Housing Stability     Do you have housing? : Yes     Are you worried about losing your housing?: No        Current Meds    Current Outpatient Medications:     albuterol (PROAIR HFA/PROVENTIL HFA/VENTOLIN HFA) 108 (90 Base) MCG/ACT inhaler, Inhale 2 puffs into the lungs every 6 hours as needed for wheezing or cough., Disp: 18 g, Rfl: 0    aspirin (ASA) 81 MG chewable tablet, Take 1 tablet (81 mg) by mouth daily., Disp: 90 tablet, Rfl: 2    benzonatate (TESSALON) 100 MG capsule, Take 1 capsule (100 mg) by mouth 3 times daily as needed for cough., Disp: 21 capsule, Rfl: 0    fluticasone (FLONASE) 50 MCG/ACT nasal spray, Spray 2 sprays into both nostrils daily., Disp: 48 g, Rfl: 2    metoprolol succinate ER (TOPROL XL) 50 MG 24 hr tablet, Take 1 tablet (50 mg) by mouth daily, Disp: 90 tablet, Rfl: 2    simvastatin (ZOCOR) 20 MG tablet, Take 1 tablet (20 mg) by mouth at bedtime, Disp: 90 tablet, Rfl: 2     OBJECTIVE    Vitals reviewed by Eugenio Ortiz PA-C  BP (!) 154/92   Pulse 69   Temp 98  F (36.7  C) (Tympanic)   Resp 16   Wt 71.7 kg (158 lb)   LMP 10/18/2024   SpO2 100%   BMI 27.12 kg/m      Physical Exam:    Physical Exam  Vitals and nursing note reviewed.   Constitutional:       General: She is not in acute distress.     Appearance: She is well-developed. She is not ill-appearing, toxic-appearing or diaphoretic.   HENT:      Head: Normocephalic and atraumatic.      Right Ear: Tympanic membrane and  external ear normal. No drainage, swelling or tenderness. Tympanic membrane is not perforated, erythematous, retracted or bulging.      Left Ear: Tympanic membrane and external ear normal. No drainage, swelling or tenderness. Tympanic membrane is not perforated, erythematous, retracted or bulging.      Nose: No mucosal edema, congestion or rhinorrhea.      Right Sinus: No maxillary sinus tenderness or frontal sinus tenderness.      Left Sinus: No maxillary sinus tenderness or frontal sinus tenderness.      Mouth/Throat:      Pharynx: No pharyngeal swelling, oropharyngeal exudate, posterior oropharyngeal erythema or uvula swelling.      Tonsils: No tonsillar abscesses.   Eyes:      Pupils: Pupils are equal, round, and reactive to light.   Neck:      Trachea: Trachea normal.   Cardiovascular:      Rate and Rhythm: Normal rate and regular rhythm.      Heart sounds: Normal heart sounds, S1 normal and S2 normal. No murmur heard.     No friction rub. No gallop.   Pulmonary:      Effort: Pulmonary effort is normal. No respiratory distress.      Breath sounds: Normal breath sounds. No decreased breath sounds, wheezing, rhonchi or rales.   Abdominal:      General: Bowel sounds are normal. There is no distension.      Palpations: Abdomen is soft. Abdomen is not rigid. There is no mass.      Tenderness: There is no abdominal tenderness. There is no guarding or rebound.   Musculoskeletal:      Left hand: Laceration present.        Hands:       Cervical back: Full passive range of motion without pain, normal range of motion and neck supple.      Comments: Roughly 4 cm laceration to the L palm more superficial in nature.  No active bleeding.     Lymphadenopathy:      Cervical: No cervical adenopathy.   Skin:     General: Skin is warm and dry.   Neurological:      Mental Status: She is alert and oriented to person, place, and time.      Cranial Nerves: No cranial nerve deficit.      Deep Tendon Reflexes: Reflexes are normal and  symmetric.   Psychiatric:         Behavior: Behavior normal. Behavior is cooperative.         Thought Content: Thought content normal.         Judgment: Judgment normal.         Eugenio Ortiz PA-C 10:15 AM

## 2024-12-26 ENCOUNTER — PATIENT OUTREACH (OUTPATIENT)
Dept: CARE COORDINATION | Facility: CLINIC | Age: 49
End: 2024-12-26
Payer: COMMERCIAL

## 2025-06-10 ENCOUNTER — DOCUMENTATION ONLY (OUTPATIENT)
Dept: LAB | Facility: CLINIC | Age: 50
End: 2025-06-10

## 2025-06-10 DIAGNOSIS — E78.5 HYPERLIPIDEMIA LDL GOAL <100: ICD-10-CM

## 2025-06-10 DIAGNOSIS — I10 HYPERTENSION, GOAL BELOW 140/90: Primary | ICD-10-CM

## 2025-06-10 NOTE — PROGRESS NOTES
Sindhu RAAD Bennett has an upcoming lab appointment:    Future Appointments   Date Time Provider Department Center   6/23/2025  9:00 AM BA LAB ONLY [facility]   6/26/2025  1:30 PM Sonia Rodriguez MD PhD [facility]     Patient is scheduled for the following lab(s):     There is no order available. Please review and place either future orders or HMPO (Review of Health Maintenance Protocol Orders), as appropriate.    Health Maintenance Due   Topic    BMP     LIPID     MICROALBUMIN     ANNUAL REVIEW OF HM ORDERS      Ayanna Lima MLT

## 2025-06-23 ENCOUNTER — LAB (OUTPATIENT)
Dept: LAB | Facility: CLINIC | Age: 50
End: 2025-06-23
Payer: COMMERCIAL

## 2025-06-23 DIAGNOSIS — I10 HYPERTENSION, GOAL BELOW 140/90: ICD-10-CM

## 2025-06-23 DIAGNOSIS — E78.5 HYPERLIPIDEMIA LDL GOAL <100: ICD-10-CM

## 2025-06-23 LAB
CREAT UR-MCNC: 42.7 MG/DL
ERYTHROCYTE [DISTWIDTH] IN BLOOD BY AUTOMATED COUNT: 13.1 % (ref 10–15)
HCT VFR BLD AUTO: 40.8 % (ref 35–47)
HGB BLD-MCNC: 13.5 G/DL (ref 11.7–15.7)
MCH RBC QN AUTO: 28.3 PG (ref 26.5–33)
MCHC RBC AUTO-ENTMCNC: 33.1 G/DL (ref 31.5–36.5)
MCV RBC AUTO: 86 FL (ref 78–100)
MICROALBUMIN UR-MCNC: <12 MG/L
MICROALBUMIN/CREAT UR: NORMAL MG/G{CREAT}
PLATELET # BLD AUTO: 212 10E3/UL (ref 150–450)
RBC # BLD AUTO: 4.77 10E6/UL (ref 3.8–5.2)
WBC # BLD AUTO: 6 10E3/UL (ref 4–11)

## 2025-06-23 PROCEDURE — 85027 COMPLETE CBC AUTOMATED: CPT

## 2025-06-23 PROCEDURE — 80053 COMPREHEN METABOLIC PANEL: CPT

## 2025-06-23 PROCEDURE — 82570 ASSAY OF URINE CREATININE: CPT

## 2025-06-23 PROCEDURE — 80061 LIPID PANEL: CPT

## 2025-06-23 PROCEDURE — 36415 COLL VENOUS BLD VENIPUNCTURE: CPT

## 2025-06-23 PROCEDURE — 82043 UR ALBUMIN QUANTITATIVE: CPT

## 2025-06-24 LAB
ALBUMIN SERPL BCG-MCNC: 4.6 G/DL (ref 3.5–5.2)
ALP SERPL-CCNC: 64 U/L (ref 40–150)
ALT SERPL W P-5'-P-CCNC: 17 U/L (ref 0–50)
ANION GAP SERPL CALCULATED.3IONS-SCNC: 11 MMOL/L (ref 7–15)
AST SERPL W P-5'-P-CCNC: 22 U/L (ref 0–45)
BILIRUB SERPL-MCNC: 0.7 MG/DL
BUN SERPL-MCNC: 14.1 MG/DL (ref 6–20)
CALCIUM SERPL-MCNC: 9.5 MG/DL (ref 8.8–10.4)
CHLORIDE SERPL-SCNC: 105 MMOL/L (ref 98–107)
CHOLEST SERPL-MCNC: 147 MG/DL
CREAT SERPL-MCNC: 0.79 MG/DL (ref 0.51–0.95)
EGFRCR SERPLBLD CKD-EPI 2021: >90 ML/MIN/1.73M2
FASTING STATUS PATIENT QL REPORTED: YES
FASTING STATUS PATIENT QL REPORTED: YES
GLUCOSE SERPL-MCNC: 96 MG/DL (ref 70–99)
HCO3 SERPL-SCNC: 22 MMOL/L (ref 22–29)
HDLC SERPL-MCNC: 48 MG/DL
LDLC SERPL CALC-MCNC: 83 MG/DL
NONHDLC SERPL-MCNC: 99 MG/DL
POTASSIUM SERPL-SCNC: 4.6 MMOL/L (ref 3.4–5.3)
PROT SERPL-MCNC: 7.3 G/DL (ref 6.4–8.3)
SODIUM SERPL-SCNC: 138 MMOL/L (ref 135–145)
TRIGL SERPL-MCNC: 82 MG/DL

## 2025-06-26 ENCOUNTER — OFFICE VISIT (OUTPATIENT)
Dept: FAMILY MEDICINE | Facility: CLINIC | Age: 50
End: 2025-06-26
Payer: COMMERCIAL

## 2025-06-26 ENCOUNTER — RESULTS FOLLOW-UP (OUTPATIENT)
Dept: FAMILY MEDICINE | Facility: CLINIC | Age: 50
End: 2025-06-26

## 2025-06-26 VITALS
HEIGHT: 64 IN | TEMPERATURE: 97.4 F | WEIGHT: 149.5 LBS | DIASTOLIC BLOOD PRESSURE: 74 MMHG | RESPIRATION RATE: 16 BRPM | BODY MASS INDEX: 25.52 KG/M2 | SYSTOLIC BLOOD PRESSURE: 135 MMHG | HEART RATE: 68 BPM | OXYGEN SATURATION: 99 %

## 2025-06-26 DIAGNOSIS — Z12.31 VISIT FOR SCREENING MAMMOGRAM: ICD-10-CM

## 2025-06-26 DIAGNOSIS — I10 HYPERTENSION, GOAL BELOW 140/90: ICD-10-CM

## 2025-06-26 DIAGNOSIS — E78.5 HYPERLIPIDEMIA LDL GOAL <100: ICD-10-CM

## 2025-06-26 DIAGNOSIS — Z87.74 S/P REPAIR OF COARCTATION OF AORTA: ICD-10-CM

## 2025-06-26 DIAGNOSIS — Z00.00 ROUTINE GENERAL MEDICAL EXAMINATION AT A HEALTH CARE FACILITY: Primary | ICD-10-CM

## 2025-06-26 PROCEDURE — 99214 OFFICE O/P EST MOD 30 MIN: CPT | Mod: 25 | Performed by: INTERNAL MEDICINE

## 2025-06-26 PROCEDURE — 90746 HEPB VACCINE 3 DOSE ADULT IM: CPT | Performed by: INTERNAL MEDICINE

## 2025-06-26 PROCEDURE — 90471 IMMUNIZATION ADMIN: CPT | Performed by: INTERNAL MEDICINE

## 2025-06-26 PROCEDURE — G2211 COMPLEX E/M VISIT ADD ON: HCPCS | Performed by: INTERNAL MEDICINE

## 2025-06-26 PROCEDURE — 99396 PREV VISIT EST AGE 40-64: CPT | Mod: 25 | Performed by: INTERNAL MEDICINE

## 2025-06-26 RX ORDER — METOPROLOL SUCCINATE 50 MG/1
50 TABLET, EXTENDED RELEASE ORAL DAILY
Qty: 90 TABLET | Refills: 3 | Status: SHIPPED | OUTPATIENT
Start: 2025-06-26

## 2025-06-26 RX ORDER — SIMVASTATIN 20 MG
20 TABLET ORAL AT BEDTIME
Qty: 90 TABLET | Refills: 3 | Status: SHIPPED | OUTPATIENT
Start: 2025-06-26

## 2025-06-26 SDOH — HEALTH STABILITY: PHYSICAL HEALTH: ON AVERAGE, HOW MANY DAYS PER WEEK DO YOU ENGAGE IN MODERATE TO STRENUOUS EXERCISE (LIKE A BRISK WALK)?: 5 DAYS

## 2025-06-26 ASSESSMENT — SOCIAL DETERMINANTS OF HEALTH (SDOH): HOW OFTEN DO YOU GET TOGETHER WITH FRIENDS OR RELATIVES?: TWICE A WEEK

## 2025-06-26 ASSESSMENT — PAIN SCALES - GENERAL: PAINLEVEL_OUTOF10: NO PAIN (0)

## 2025-06-26 NOTE — PATIENT INSTRUCTIONS
Patient Education   Preventive Care Advice   This is general advice given by our system to help you stay healthy. However, your care team may have specific advice just for you. Please talk to your care team about your preventive care needs.  Nutrition  Eat 5 or more servings of fruits and vegetables each day.  Try wheat bread, brown rice and whole grain pasta (instead of white bread, rice, and pasta).  Get enough calcium and vitamin D. Check the label on foods and aim for 100% of the RDA (recommended daily allowance).  Lifestyle  Exercise at least 150 minutes each week  (30 minutes a day, 5 days a week).  Do muscle strengthening activities 2 days a week. These help control your weight and prevent disease.  No smoking.  Wear sunscreen to prevent skin cancer.  Have a dental exam and cleaning every 6 months.  Yearly exams  See your health care team every year to talk about:  Any changes in your health.  Any medicines your care team has prescribed.  Preventive care, family planning, and ways to prevent chronic diseases.  Shots (vaccines)   HPV shots (up to age 26), if you've never had them before.  Hepatitis B shots (up to age 59), if you've never had them before.  COVID-19 shot: Get this shot when it's due.  Flu shot: Get a flu shot every year.  Tetanus shot: Get a tetanus shot every 10 years.  Pneumococcal, hepatitis A, and RSV shots: Ask your care team if you need these based on your risk.  Shingles shot (for age 50 and up)  General health tests  Diabetes screening:  Starting at age 35, Get screened for diabetes at least every 3 years.  If you are younger than age 35, ask your care team if you should be screened for diabetes.  Cholesterol test: At age 39, start having a cholesterol test every 5 years, or more often if advised.  Bone density scan (DEXA): At age 50, ask your care team if you should have this scan for osteoporosis (brittle bones).  Hepatitis C: Get tested at least once in your life.  STIs (sexually  transmitted infections)  Before age 24: Ask your care team if you should be screened for STIs.  After age 24: Get screened for STIs if you're at risk. You are at risk for STIs (including HIV) if:  You are sexually active with more than one person.  You don't use condoms every time.  You or a partner was diagnosed with a sexually transmitted infection.  If you are at risk for HIV, ask about PrEP medicine to prevent HIV.  Get tested for HIV at least once in your life, whether you are at risk for HIV or not.  Cancer screening tests  Cervical cancer screening: If you have a cervix, begin getting regular cervical cancer screening tests starting at age 21.  Breast cancer scan (mammogram): If you've ever had breasts, begin having regular mammograms starting at age 40. This is a scan to check for breast cancer.  Colon cancer screening: It is important to start screening for colon cancer at age 45.  Have a colonoscopy test every 10 years (or more often if you're at risk) Or, ask your provider about stool tests like a FIT test every year or Cologuard test every 3 years.  To learn more about your testing options, visit:   .  For help making a decision, visit:   https://bit.ly/zz22394.  Prostate cancer screening test: If you have a prostate, ask your care team if a prostate cancer screening test (PSA) at age 55 is right for you.  Lung cancer screening: If you are a current or former smoker ages 50 to 80, ask your care team if ongoing lung cancer screenings are right for you.  For informational purposes only. Not to replace the advice of your health care provider. Copyright   2023 [employer]. All rights reserved. Clinically reviewed by the [facility] Transitions Program. Shahiya [id number] - REV 01/24.

## 2025-06-26 NOTE — PROGRESS NOTES
"Preventive Care Visit  [facility]  Sonia Rodriguez MD PhD, Internal Medicine - Pediatrics  Jun 26, 2025      Assessment & Plan     Sindhu was seen today for physical.    Diagnoses and all orders for this visit:    Routine general medical examination at a health care facility  -     MA Screening Bilateral w/ Jose Rafael; Future  -     REVIEW OF HEALTH MAINTENANCE PROTOCOL ORDERS  -     HEPATITIS B, ADULT 20+ (ENGERIX-B/RECOMBIVAX HB)  -     PRIMARY CARE FOLLOW-UP SCHEDULING; Future    Visit for screening mammogram  -     MA Screening Bilateral w/ Jose Rafael; Future    Hypertension, goal below 140/90  Comments:  home BP is better. pt will Mychart update home BP  Orders:  -     metoprolol succinate ER (TOPROL XL) 50 MG 24 hr tablet; Take 1 tablet (50 mg) by mouth daily.    S/P repair of coarctation of aorta  Comments:  Asymptomatic.  Lost to follow-up with cardiology.  Last echo was in 2018.  Recheck echo.  Orders:  -     simvastatin (ZOCOR) 20 MG tablet; Take 1 tablet (20 mg) by mouth at bedtime.  -     ECHO Congenital Transthoracic (TTE); Future    Hyperlipidemia LDL goal <100  -     simvastatin (ZOCOR) 20 MG tablet; Take 1 tablet (20 mg) by mouth at bedtime.          BMI  Estimated body mass index is 26.07 kg/m  as calculated from the following:    Height as of this encounter: 1.613 m (5' 3.5\").    Weight as of this encounter: 67.8 kg (149 lb 8 oz).     Reviewed preventive health counseling, as reflected in patient instructions  Counseling  Appropriate preventive services were addressed with this patient via screening, questionnaire, or discussion as appropriate for fall prevention, nutrition, physical activity, Tobacco-use cessation, social engagement, weight loss and cognition.  Checklist reviewing preventive services available has been given to the patient.  Reviewed patient's diet, addressing concerns and/or questions.   The patient was instructed to see the dentist every 6 months. "         Follow-up  Return in about 1 year (around 6/26/2026) for Physical Exam.    Steve Manley is a 49 year old, presenting for the following:  Physical        6/26/2025     1:38 PM   Additional Questions   Roomed by Sammy   Accompanied by Self            Sindhu Bennett is a 49 year old female who has S/P repair of coarctation of aorta and Hypertension, goal below 140/90 on their pertinent problem list.       - Borderline blood pressure, ranging from 123 to 135 systolic over 74 diastolic  - Last mammogram performed in 2019    Advance Care Planning    Discussed advance care planning with patient; informed AVS has link to [employer] [employer].        6/26/2025   General Health   How would you rate your overall physical health? Good   Feel stress (tense, anxious, or unable to sleep) Only a little   (!) STRESS CONCERN      6/26/2025   Nutrition   Three or more servings of calcium each day? Yes   Diet: Other   If other, please elaborate: I have a health  and work with [employer]   How many servings of fruit and vegetables per day? (!) 2-3   How many sweetened beverages each day? 0-1         6/26/2025   Exercise   Days per week of moderate/strenous exercise 5 days         6/26/2025   Social Factors   Frequency of gathering with friends or relatives Twice a week   Worry food won't last until get money to buy more No   Food not last or not have enough money for food? No   Do you have housing? (Housing is defined as stable permanent housing and does not include staying outside in a car, in a tent, in an abandoned building, in an overnight shelter, or couch-surfing.) Yes   Are you worried about losing your housing? No   Lack of transportation? No   Unable to get utilities (heat,electricity)? No         6/26/2025   Dental   Dentist two times every year? (!) NO         Today's PHQ-2 Score:       6/26/2025     1:37 PM   PHQ-2 ( 1999 [employer])   Q1: Little interest or pleasure in doing things 0   Q2: Feeling  down, depressed or hopeless 0   PHQ-2 Score 0    Q1: Little interest or pleasure in doing things Not at all   Q2: Feeling down, depressed or hopeless Not at all   PHQ-2 Score 0       Patient-reported           6/26/2025   Substance Use   Alcohol more than 3/day or more than 7/wk No   Do you use any other substances recreationally? No     Social History     Tobacco Use    Smoking status: Never     Passive exposure: Never    Smokeless tobacco: Never   Vaping Use    Vaping status: Never Used   Substance Use Topics    Alcohol use: Yes     Comment: occ.    Drug use: No           4/5/2023   LAST FHS-7 RESULTS   1st degree relative breast or ovarian cancer Yes    Any relative bilateral breast cancer No        Proxy-reported        Mammogram Screening - Mammogram every 1-2 years updated in Health Maintenance based on mutual decision making        6/26/2025   STI Screening   New sexual partner(s) since last STI/HIV test? No     History of abnormal Pap smear: No - age 30- 64 PAP with HPV every 5 years recommended        Latest Ref Rng & Units 4/5/2023    10:07 AM 2/8/2018     8:26 AM 2/8/2018     8:21 AM   PAP / HPV   PAP  Negative for Intraepithelial Lesion or Malignancy (NILM)      PAP (Historical)    NIL    HPV 16 DNA Negative Negative  Negative     HPV 18 DNA Negative Negative  Negative     Other HR HPV Negative Negative  Negative       ASCVD Risk   The 10-year ASCVD risk score (Preet CACERES, et al., 2019) is: 1.2%    Values used to calculate the score:      Age: 49 years      Sex: Female      Is Non- : No      Diabetic: No      Tobacco smoker: No      Systolic Blood Pressure: 135 mmHg      Is BP treated: Yes      HDL Cholesterol: 48 mg/dL      Total Cholesterol: 147 mg/dL        6/26/2025   Contraception/Family Planning   Questions about contraception or family planning No   What are your periods like? (!) IRREGULAR        Reviewed and updated as needed this visit by Provider                 "    Current Outpatient Medications   Medication Sig Dispense Refill    albuterol (PROAIR HFA/PROVENTIL HFA/VENTOLIN HFA) 108 (90 Base) MCG/ACT inhaler Inhale 2 puffs into the lungs every 6 hours as needed for wheezing or cough. 18 g 0    aspirin (ASA) 81 MG chewable tablet Take 1 tablet (81 mg) by mouth daily. 90 tablet 2    benzonatate (TESSALON) 100 MG capsule Take 1 capsule (100 mg) by mouth 3 times daily as needed for cough. 21 capsule 0    fluticasone (FLONASE) 50 MCG/ACT nasal spray Spray 2 sprays into both nostrils daily. 48 g 2    metoprolol succinate ER (TOPROL XL) 50 MG 24 hr tablet Take 1 tablet (50 mg) by mouth daily. 90 tablet 3    simvastatin (ZOCOR) 20 MG tablet Take 1 tablet (20 mg) by mouth at bedtime. 90 tablet 3         Review of Systems  Constitutional, HEENT, cardiovascular, pulmonary, gi and gu systems are negative, except as otherwise noted.     Objective    Exam  /74   Pulse 68   Temp 97.4  F (36.3  C) (Tympanic)   Resp 16   Ht 1.613 m (5' 3.5\")   Wt 67.8 kg (149 lb 8 oz)   SpO2 99%   BMI 26.07 kg/m     Estimated body mass index is 26.07 kg/m  as calculated from the following:    Height as of this encounter: 1.613 m (5' 3.5\").    Weight as of this encounter: 67.8 kg (149 lb 8 oz).    Physical Exam  GENERAL: alert and no distress  EYES: Eyes grossly normal to inspection, PERRL and conjunctivae and sclerae normal  HENT: ear canals and TM's normal, nose and mouth without ulcers or lesions  NECK: no adenopathy, no asymmetry, masses, or scars  RESP: lungs clear to auscultation - no rales, rhonchi or wheezes  CV: regular rate and rhythm, normal S1 S2, no S3 or S4, 2/6 systolic murmur at the sternal border bilaterally, no peripheral edema  ABDOMEN: soft, nontender, no hepatosplenomegaly, no masses and bowel sounds normal  MS: no gross musculoskeletal defects noted, no edema  SKIN: no suspicious lesions or rashes  NEURO: Normal strength and tone, mentation intact and speech " normal  PSYCH: mentation appears normal, affect normal/bright        Signed Electronically by: Sonia Rodriguez MD PhD

## 2025-06-26 NOTE — NURSING NOTE
Prior to immunization administration, verified patients identity using patient s name and date of birth. Please see Immunization Activity for additional information.     Screening Questionnaire for Adult Immunization    Are you sick today?   No   Do you have allergies to medications, food, a vaccine component or latex?   Yes   Have you ever had a serious reaction after receiving a vaccination?   No   Do you have a long-term health problem with heart, lung, kidney, or metabolic disease (e.g., diabetes), asthma, a blood disorder, no spleen, complement component deficiency, a cochlear implant, or a spinal fluid leak?  Are you on long-term aspirin therapy?   Yes   Do you have cancer, leukemia, HIV/AIDS, or any other immune system problem?   No   Do you have a parent, brother, or sister with an immune system problem?   No   In the past 3 months, have you taken medications that affect  your immune system, such as prednisone, other steroids, or anticancer drugs; drugs for the treatment of rheumatoid arthritis, Crohn s disease, or psoriasis; or have you had radiation treatments?   No   Have you had a seizure, or a brain or other nervous system problem?   No   During the past year, have you received a transfusion of blood or blood    products, or been given immune (gamma) globulin or antiviral drug?   No   For women: Are you pregnant or is there a chance you could become       pregnant during the next month?   No   Have you received any vaccinations in the past 4 weeks?   No     Immunization questionnaire answers were all negative.      Patient instructed to remain in clinic for 15 minutes afterwards, and to report any adverse reactions.     Screening performed by Jennifer Rowe MA on 6/26/2025 at 2:19 PM.

## 2025-07-15 ENCOUNTER — TELEPHONE (OUTPATIENT)
Dept: FAMILY MEDICINE | Facility: CLINIC | Age: 50
End: 2025-07-15
Payer: COMMERCIAL

## 2025-07-15 DIAGNOSIS — I10 HYPERTENSION, GOAL BELOW 140/90: Primary | ICD-10-CM

## 2025-07-15 DIAGNOSIS — I10 HYPERTENSION, GOAL BELOW 140/90: ICD-10-CM

## 2025-07-15 DIAGNOSIS — Z87.74 S/P REPAIR OF COARCTATION OF AORTA: Primary | ICD-10-CM

## 2025-07-15 DIAGNOSIS — Z87.74 S/P REPAIR OF COARCTATION OF AORTA: ICD-10-CM

## 2025-07-15 NOTE — TELEPHONE ENCOUNTER
Spoke with patient to help her re-establish congenital cardiology care with Dr. Love at [facility]. Patient states that she currently has no cardiac concerns. [facility] and invited her to call or write in with any questions.

## 2025-07-15 NOTE — TELEPHONE ENCOUNTER
[facility] Call Center    Phone Message    May a detailed message be left on voicemail: yes     Reason for Call: Other: Please call patient to schedule congenital echo  . Patient would like [place] if at all possible    Action Taken: Other: cardio    Travel Screening: Not Applicable     Date of Service:

## 2025-07-19 ENCOUNTER — HEALTH MAINTENANCE LETTER (OUTPATIENT)
Age: 50
End: 2025-07-19

## 2025-08-07 ENCOUNTER — PATIENT OUTREACH (OUTPATIENT)
Dept: CARE COORDINATION | Facility: CLINIC | Age: 50
End: 2025-08-07
Payer: COMMERCIAL

## 2025-09-04 ENCOUNTER — OFFICE VISIT (OUTPATIENT)
Dept: FAMILY MEDICINE | Facility: CLINIC | Age: 50
End: 2025-09-04
Payer: COMMERCIAL

## 2025-09-04 VITALS
HEART RATE: 90 BPM | SYSTOLIC BLOOD PRESSURE: 136 MMHG | OXYGEN SATURATION: 100 % | TEMPERATURE: 99.3 F | RESPIRATION RATE: 12 BRPM | DIASTOLIC BLOOD PRESSURE: 72 MMHG | WEIGHT: 156 LBS | BODY MASS INDEX: 27.2 KG/M2

## 2025-09-04 DIAGNOSIS — J01.90 ACUTE BACTERIAL RHINOSINUSITIS: Primary | ICD-10-CM

## 2025-09-04 DIAGNOSIS — B96.89 ACUTE BACTERIAL RHINOSINUSITIS: Primary | ICD-10-CM

## 2025-09-04 RX ORDER — DOXYCYCLINE 100 MG/1
100 CAPSULE ORAL 2 TIMES DAILY
Qty: 14 CAPSULE | Refills: 0 | Status: SHIPPED | OUTPATIENT
Start: 2025-09-04 | End: 2025-09-11

## 2025-09-04 ASSESSMENT — PAIN SCALES - GENERAL: PAINLEVEL_OUTOF10: NO PAIN (0)
